# Patient Record
Sex: FEMALE | Race: WHITE | Employment: OTHER | URBAN - METROPOLITAN AREA
[De-identification: names, ages, dates, MRNs, and addresses within clinical notes are randomized per-mention and may not be internally consistent; named-entity substitution may affect disease eponyms.]

---

## 2017-02-07 ENCOUNTER — TELEPHONE (OUTPATIENT)
Dept: ENT CLINIC | Age: 82
End: 2017-02-07

## 2017-02-07 ENCOUNTER — OFFICE VISIT (OUTPATIENT)
Dept: ENT CLINIC | Age: 82
End: 2017-02-07

## 2017-02-07 VITALS
RESPIRATION RATE: 16 BRPM | HEIGHT: 66 IN | WEIGHT: 142 LBS | BODY MASS INDEX: 22.82 KG/M2 | SYSTOLIC BLOOD PRESSURE: 133 MMHG | DIASTOLIC BLOOD PRESSURE: 67 MMHG

## 2017-02-07 DIAGNOSIS — H61.21 IMPACTED CERUMEN OF RIGHT EAR: Primary | ICD-10-CM

## 2017-02-07 DIAGNOSIS — H90.5 HEARING DISORDER, SENSORINEURAL: ICD-10-CM

## 2017-02-07 DIAGNOSIS — H60.8X3 CHRONIC ECZEMATOUS OTITIS EXTERNA OF BOTH EARS: Primary | ICD-10-CM

## 2017-02-07 PROCEDURE — 1123F ACP DISCUSS/DSCN MKR DOCD: CPT | Performed by: OTOLARYNGOLOGY

## 2017-02-07 PROCEDURE — 99202 OFFICE O/P NEW SF 15 MIN: CPT | Performed by: OTOLARYNGOLOGY

## 2017-02-07 PROCEDURE — 4040F PNEUMOC VAC/ADMIN/RCVD: CPT | Performed by: OTOLARYNGOLOGY

## 2017-02-07 PROCEDURE — 1036F TOBACCO NON-USER: CPT | Performed by: OTOLARYNGOLOGY

## 2017-02-07 PROCEDURE — G8427 DOCREV CUR MEDS BY ELIG CLIN: HCPCS | Performed by: OTOLARYNGOLOGY

## 2017-02-07 PROCEDURE — 1090F PRES/ABSN URINE INCON ASSESS: CPT | Performed by: OTOLARYNGOLOGY

## 2017-02-07 PROCEDURE — G8420 CALC BMI NORM PARAMETERS: HCPCS | Performed by: OTOLARYNGOLOGY

## 2017-02-07 PROCEDURE — G8484 FLU IMMUNIZE NO ADMIN: HCPCS | Performed by: OTOLARYNGOLOGY

## 2017-02-07 RX ORDER — TRIAMCINOLONE ACETONIDE 1 MG/G
CREAM TOPICAL
Qty: 15 G | Refills: 1 | Status: ON HOLD | OUTPATIENT
Start: 2017-02-07 | End: 2020-11-24 | Stop reason: HOSPADM

## 2017-02-07 RX ORDER — DILTIAZEM HYDROCHLORIDE 240 MG/1
240 CAPSULE, COATED, EXTENDED RELEASE ORAL DAILY
Status: ON HOLD | COMMUNITY
End: 2020-10-27 | Stop reason: HOSPADM

## 2017-02-07 ASSESSMENT — ENCOUNTER SYMPTOMS
RESPIRATORY NEGATIVE: 1
EYES NEGATIVE: 1
ALLERGIC/IMMUNOLOGIC NEGATIVE: 1
SINUS PRESSURE: 0

## 2017-02-14 ENCOUNTER — TELEPHONE (OUTPATIENT)
Dept: ENT CLINIC | Age: 82
End: 2017-02-14

## 2017-02-20 ENCOUNTER — OFFICE VISIT (OUTPATIENT)
Dept: ORTHOPEDIC SURGERY | Age: 82
End: 2017-02-20

## 2017-02-20 VITALS
SYSTOLIC BLOOD PRESSURE: 133 MMHG | DIASTOLIC BLOOD PRESSURE: 67 MMHG | HEIGHT: 66 IN | WEIGHT: 142 LBS | BODY MASS INDEX: 22.82 KG/M2

## 2017-02-20 DIAGNOSIS — M89.8X6 PAIN OF LEFT TIBIA: Primary | ICD-10-CM

## 2017-02-20 PROCEDURE — 99203 OFFICE O/P NEW LOW 30 MIN: CPT | Performed by: ORTHOPAEDIC SURGERY

## 2017-02-20 PROCEDURE — 1036F TOBACCO NON-USER: CPT | Performed by: ORTHOPAEDIC SURGERY

## 2017-02-20 PROCEDURE — 1090F PRES/ABSN URINE INCON ASSESS: CPT | Performed by: ORTHOPAEDIC SURGERY

## 2017-02-20 PROCEDURE — 4040F PNEUMOC VAC/ADMIN/RCVD: CPT | Performed by: ORTHOPAEDIC SURGERY

## 2017-02-20 PROCEDURE — 73590 X-RAY EXAM OF LOWER LEG: CPT | Performed by: ORTHOPAEDIC SURGERY

## 2017-02-20 PROCEDURE — 1123F ACP DISCUSS/DSCN MKR DOCD: CPT | Performed by: ORTHOPAEDIC SURGERY

## 2017-02-20 PROCEDURE — G8484 FLU IMMUNIZE NO ADMIN: HCPCS | Performed by: ORTHOPAEDIC SURGERY

## 2017-02-20 PROCEDURE — G8427 DOCREV CUR MEDS BY ELIG CLIN: HCPCS | Performed by: ORTHOPAEDIC SURGERY

## 2017-02-20 PROCEDURE — G8420 CALC BMI NORM PARAMETERS: HCPCS | Performed by: ORTHOPAEDIC SURGERY

## 2017-02-20 RX ORDER — SULFAMETHOXAZOLE AND TRIMETHOPRIM 800; 160 MG/1; MG/1
1 TABLET ORAL 2 TIMES DAILY
Qty: 20 TABLET | Refills: 0 | Status: SHIPPED | OUTPATIENT
Start: 2017-02-20 | End: 2017-03-02

## 2017-03-20 ENCOUNTER — OFFICE VISIT (OUTPATIENT)
Dept: ORTHOPEDIC SURGERY | Age: 82
End: 2017-03-20

## 2017-03-20 VITALS — BODY MASS INDEX: 22.82 KG/M2 | HEIGHT: 66 IN | WEIGHT: 142 LBS

## 2017-03-20 DIAGNOSIS — M89.8X6 PAIN OF LEFT TIBIA: Primary | ICD-10-CM

## 2017-03-20 PROCEDURE — 1090F PRES/ABSN URINE INCON ASSESS: CPT | Performed by: ORTHOPAEDIC SURGERY

## 2017-03-20 PROCEDURE — G8427 DOCREV CUR MEDS BY ELIG CLIN: HCPCS | Performed by: ORTHOPAEDIC SURGERY

## 2017-03-20 PROCEDURE — G8484 FLU IMMUNIZE NO ADMIN: HCPCS | Performed by: ORTHOPAEDIC SURGERY

## 2017-03-20 PROCEDURE — G8420 CALC BMI NORM PARAMETERS: HCPCS | Performed by: ORTHOPAEDIC SURGERY

## 2017-03-20 PROCEDURE — 99213 OFFICE O/P EST LOW 20 MIN: CPT | Performed by: ORTHOPAEDIC SURGERY

## 2017-03-20 PROCEDURE — 4040F PNEUMOC VAC/ADMIN/RCVD: CPT | Performed by: ORTHOPAEDIC SURGERY

## 2017-03-20 PROCEDURE — MISC250 COMPRESSION STOCKING: Performed by: ORTHOPAEDIC SURGERY

## 2017-03-20 PROCEDURE — 1123F ACP DISCUSS/DSCN MKR DOCD: CPT | Performed by: ORTHOPAEDIC SURGERY

## 2017-03-20 PROCEDURE — 1036F TOBACCO NON-USER: CPT | Performed by: ORTHOPAEDIC SURGERY

## 2017-03-20 RX ORDER — HYDROCHLOROTHIAZIDE 25 MG/1
TABLET ORAL
COMMUNITY
Start: 2017-02-20 | End: 2020-10-16 | Stop reason: ALTCHOICE

## 2017-03-20 RX ORDER — SULFAMETHOXAZOLE AND TRIMETHOPRIM 800; 160 MG/1; MG/1
TABLET ORAL
COMMUNITY
Start: 2017-02-20 | End: 2018-08-27

## 2017-03-20 RX ORDER — OXYCODONE HYDROCHLORIDE AND ACETAMINOPHEN 5; 325 MG/1; MG/1
TABLET ORAL
COMMUNITY
Start: 2017-01-03 | End: 2020-10-16 | Stop reason: ALTCHOICE

## 2017-03-20 RX ORDER — LEVOTHYROXINE SODIUM 100 MCG
100 TABLET ORAL DAILY
Status: ON HOLD | COMMUNITY
Start: 2017-02-23 | End: 2020-10-27 | Stop reason: HOSPADM

## 2017-03-20 RX ORDER — ESCITALOPRAM OXALATE 10 MG/1
10 TABLET ORAL NIGHTLY
Status: ON HOLD | COMMUNITY
Start: 2017-01-03 | End: 2020-10-27 | Stop reason: HOSPADM

## 2017-03-20 RX ORDER — SULFAMETHOXAZOLE AND TRIMETHOPRIM 800; 160 MG/1; MG/1
TABLET ORAL
Qty: 14 TABLET | Refills: 0 | Status: SHIPPED | OUTPATIENT
Start: 2017-03-20 | End: 2018-08-27

## 2017-03-20 RX ORDER — PAREGORIC 2 MG/5ML
LIQUID ORAL
COMMUNITY
Start: 2017-01-03 | End: 2020-10-16 | Stop reason: ALTCHOICE

## 2017-12-01 ENCOUNTER — HOSPITAL ENCOUNTER (OUTPATIENT)
Dept: OTHER | Age: 82
Discharge: HOME | End: 2017-12-01
Attending: INTERNAL MEDICINE | Admitting: INTERNAL MEDICINE

## 2017-12-01 LAB
A/G RATIO: 1.2 (ref 1.1–2.2)
ALBUMIN SERPL-MCNC: 3.9 G/DL (ref 3.4–5)
ALP BLD-CCNC: 64 U/L (ref 40–129)
ALT SERPL-CCNC: 14 U/L (ref 10–40)
ANION GAP SERPL CALCULATED.3IONS-SCNC: 16 MMOL/L (ref 3–16)
AST SERPL-CCNC: 18 U/L (ref 15–37)
BACTERIA: ABNORMAL /HPF
BILIRUB SERPL-MCNC: 0.3 MG/DL (ref 0–1)
BILIRUBIN URINE: NEGATIVE
BLOOD, URINE: NEGATIVE
BUN BLDV-MCNC: 16 MG/DL (ref 7–20)
CALCIUM SERPL-MCNC: 9 MG/DL (ref 8.3–10.6)
CHLORIDE BLD-SCNC: 103 MMOL/L (ref 99–110)
CHOLESTEROL, TOTAL: 170 MG/DL (ref 0–199)
CLARITY: ABNORMAL
CO2: 25 MMOL/L (ref 21–32)
COLOR: YELLOW
CREAT SERPL-MCNC: 0.7 MG/DL (ref 0.6–1.2)
EPITHELIAL CELLS, UA: 7 /HPF (ref 0–5)
GFR AFRICAN AMERICAN: >60
GFR NON-AFRICAN AMERICAN: >60
GLOBULIN: 3.2 G/DL
GLUCOSE BLD-MCNC: 101 MG/DL (ref 70–99)
GLUCOSE URINE: NEGATIVE MG/DL
HCT VFR BLD CALC: 36.3 % (ref 36–48)
HDLC SERPL-MCNC: 69 MG/DL (ref 40–60)
HEMOGLOBIN: 11.4 G/DL (ref 12–16)
HYALINE CASTS: 6 /LPF (ref 0–8)
KETONES, URINE: NEGATIVE MG/DL
LDL CHOLESTEROL CALCULATED: 78 MG/DL
LEUKOCYTE ESTERASE, URINE: ABNORMAL
MCH RBC QN AUTO: 25.3 PG (ref 26–34)
MCHC RBC AUTO-ENTMCNC: 31.5 G/DL (ref 31–36)
MCV RBC AUTO: 80.3 FL (ref 80–100)
MICROSCOPIC EXAMINATION: YES
NITRITE, URINE: NEGATIVE
PDW BLD-RTO: 16.8 % (ref 12.4–15.4)
PH UA: 6
PLATELET # BLD: 218 K/UL (ref 135–450)
PMV BLD AUTO: 9.9 FL (ref 5–10.5)
POTASSIUM SERPL-SCNC: 5.1 MMOL/L (ref 3.5–5.1)
PROTEIN UA: NEGATIVE MG/DL
RBC # BLD: 4.52 M/UL (ref 4–5.2)
RBC UA: 3 /HPF (ref 0–4)
SODIUM BLD-SCNC: 144 MMOL/L (ref 136–145)
SPECIFIC GRAVITY UA: 1.02
TOTAL PROTEIN: 7.1 G/DL (ref 6.4–8.2)
TRIGL SERPL-MCNC: 115 MG/DL (ref 0–150)
TSH SERPL DL<=0.05 MIU/L-ACNC: 2.98 UIU/ML (ref 0.27–4.2)
URINE TYPE: ABNORMAL
UROBILINOGEN, URINE: 0.2 E.U./DL
VITAMIN B-12: 751 PG/ML (ref 211–911)
VLDLC SERPL CALC-MCNC: 23 MG/DL
WBC # BLD: 5.6 K/UL (ref 4–11)
WBC UA: 35 /HPF (ref 0–5)

## 2018-07-05 ENCOUNTER — PROCEDURE VISIT (OUTPATIENT)
Dept: NEUROLOGY | Age: 83
End: 2018-07-05

## 2018-07-05 DIAGNOSIS — G56.23 ULNAR NEUROPATHY OF BOTH UPPER EXTREMITIES: ICD-10-CM

## 2018-07-05 DIAGNOSIS — G56.03 BILATERAL CARPAL TUNNEL SYNDROME: Primary | ICD-10-CM

## 2018-07-05 PROCEDURE — 95911 NRV CNDJ TEST 9-10 STUDIES: CPT | Performed by: PSYCHIATRY & NEUROLOGY

## 2018-07-05 PROCEDURE — 95886 MUSC TEST DONE W/N TEST COMP: CPT | Performed by: PSYCHIATRY & NEUROLOGY

## 2018-08-27 ENCOUNTER — OFFICE VISIT (OUTPATIENT)
Dept: ENT CLINIC | Age: 83
End: 2018-08-27

## 2018-08-27 VITALS
SYSTOLIC BLOOD PRESSURE: 120 MMHG | WEIGHT: 143 LBS | DIASTOLIC BLOOD PRESSURE: 66 MMHG | HEART RATE: 57 BPM | OXYGEN SATURATION: 97 % | BODY MASS INDEX: 22.98 KG/M2 | HEIGHT: 66 IN | RESPIRATION RATE: 14 BRPM

## 2018-08-27 DIAGNOSIS — H61.21 IMPACTED CERUMEN OF RIGHT EAR: Primary | ICD-10-CM

## 2018-08-27 DIAGNOSIS — H90.3 SENSORINEURAL HEARING LOSS (SNHL) OF BOTH EARS: ICD-10-CM

## 2018-08-27 PROCEDURE — 69210 REMOVE IMPACTED EAR WAX UNI: CPT | Performed by: OTOLARYNGOLOGY

## 2018-08-27 PROCEDURE — G8420 CALC BMI NORM PARAMETERS: HCPCS | Performed by: OTOLARYNGOLOGY

## 2018-08-27 PROCEDURE — 1036F TOBACCO NON-USER: CPT | Performed by: OTOLARYNGOLOGY

## 2018-08-27 PROCEDURE — 1101F PT FALLS ASSESS-DOCD LE1/YR: CPT | Performed by: OTOLARYNGOLOGY

## 2018-08-27 PROCEDURE — 99212 OFFICE O/P EST SF 10 MIN: CPT | Performed by: OTOLARYNGOLOGY

## 2018-08-27 PROCEDURE — G8427 DOCREV CUR MEDS BY ELIG CLIN: HCPCS | Performed by: OTOLARYNGOLOGY

## 2018-08-27 PROCEDURE — 1123F ACP DISCUSS/DSCN MKR DOCD: CPT | Performed by: OTOLARYNGOLOGY

## 2018-08-27 PROCEDURE — 1090F PRES/ABSN URINE INCON ASSESS: CPT | Performed by: OTOLARYNGOLOGY

## 2018-08-27 PROCEDURE — 4040F PNEUMOC VAC/ADMIN/RCVD: CPT | Performed by: OTOLARYNGOLOGY

## 2018-12-10 ENCOUNTER — HOSPITAL ENCOUNTER (EMERGENCY)
Age: 83
Discharge: HOME | End: 2018-12-10
Payer: MEDICARE

## 2018-12-10 ENCOUNTER — APPOINTMENT (OUTPATIENT)
Dept: CT IMAGING | Age: 83
End: 2018-12-10
Payer: MEDICARE

## 2018-12-10 VITALS
SYSTOLIC BLOOD PRESSURE: 135 MMHG | WEIGHT: 145 LBS | BODY MASS INDEX: 23.3 KG/M2 | DIASTOLIC BLOOD PRESSURE: 60 MMHG | RESPIRATION RATE: 16 BRPM | HEIGHT: 66 IN | OXYGEN SATURATION: 97 % | HEART RATE: 52 BPM | TEMPERATURE: 96.9 F
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
## 2018-12-10 DIAGNOSIS — R04.0 EPISTAXIS: ICD-10-CM

## 2018-12-10 DIAGNOSIS — Z23 TETANUS TOXOID VACCINATION ADMINISTERED AT CURRENT VISIT: ICD-10-CM

## 2018-12-10 DIAGNOSIS — S01.81XA LACERATION OF FOREHEAD, INITIAL ENCOUNTER: ICD-10-CM

## 2018-12-10 PROCEDURE — 70486 CT MAXILLOFACIAL W/O DYE: CPT

## 2018-12-10 PROCEDURE — 70450 CT HEAD/BRAIN W/O DYE: CPT

## 2018-12-10 PROCEDURE — 4500000022 HC ED LEVEL 2 PROCEDURE

## 2018-12-10 PROCEDURE — 96372 THER/PROPH/DIAG INJ SC/IM: CPT

## 2018-12-10 PROCEDURE — 72125 CT NECK SPINE W/O DYE: CPT

## 2018-12-10 PROCEDURE — 99284 EMERGENCY DEPT VISIT MOD MDM: CPT

## 2018-12-10 PROCEDURE — 6360000002 HC RX W HCPCS: Performed by: NURSE PRACTITIONER

## 2018-12-10 PROCEDURE — 90715 TDAP VACCINE 7 YRS/> IM: CPT | Performed by: NURSE PRACTITIONER

## 2018-12-10 PROCEDURE — 90471 IMMUNIZATION ADMIN: CPT | Performed by: NURSE PRACTITIONER

## 2018-12-10 PROCEDURE — 6370000000 HC RX 637 (ALT 250 FOR IP): Performed by: NURSE PRACTITIONER

## 2018-12-10 RX ADMIN — TETANUS TOXOID, REDUCED DIPHTHERIA TOXOID AND ACELLULAR PERTUSSIS VACCINE, ADSORBED 0.5 ML: 5; 2.5; 8; 8; 2.5 SUSPENSION INTRAMUSCULAR at 10:42

## 2018-12-10 RX ADMIN — Medication: at 11:34

## 2018-12-10 ASSESSMENT — ENCOUNTER SYMPTOMS
RHINORRHEA: 0
CONSTIPATION: 0
ABDOMINAL PAIN: 0
NAUSEA: 0
VOMITING: 0
SHORTNESS OF BREATH: 0
BLOOD IN STOOL: 0
DIARRHEA: 0
SORE THROAT: 0

## 2018-12-10 ASSESSMENT — PAIN SCALES - GENERAL: PAINLEVEL_OUTOF10: 0

## 2018-12-10 NOTE — ED NOTES
Went in to d/c patient. instructions given. Pt verbalizes understanding. Pt remains A&O. Pt stood up and her nose started bleeding again. Fermín Gains NP notified. States to apply nasal clamp and provide pt with ice packs. Nasal clamp applied. Ice packs given. Pt food arrived that was ordered earlier. Pt at bedside eating meal per pt and family request to stay in room to complete meal. Pt d/c complete, pt just eating at bedside prior to leaving.       Leighton Thompson RN  12/10/18 2355

## 2018-12-10 NOTE — ED NOTES
Pt was at dentist office today about 0845 and states she tripped over her own feet and fell. Denies having symptoms prior to fall. Pt states she has no symptoms or pain. Pt has abrasions to face. Nose was bleeding but has subsided. A&O. Family present. Call light within reach. Bed in lowest position with appropriate side rails up.        James Velasquez RN  12/10/18 6438

## 2018-12-10 NOTE — ED PROVIDER NOTES
damage noted    Treatment:     Area cleansed with:  Hibiclens    Amount of cleaning:  Standard    Irrigation solution:  Sterile water  Skin repair:     Repair method:  Tissue adhesive  Approximation:     Approximation:  Close  Post-procedure details:     Dressing:  Open (no dressing)    Patient tolerance of procedure: Tolerated well, no immediate complications         The wound is cleansed, debrided of foreign material as much as possible, and dressed. The patient is alerted to watch for any signs of infection (redness, pus, pain, increased swelling or fever) and call if such occurs. Home wound care instructions are provided. Tetanus vaccination status reviewed: Td vaccination indicated and given today. CRITICAL CARE TIME     n/a    CONSULTS:  None      EMERGENCY DEPARTMENT COURSE and DIFFERENTIAL DIAGNOSIS/MDM:   Vitals:    Vitals:    12/10/18 0950 12/10/18 1036 12/10/18 1208   BP: 125/72 (!) 137/59 135/60   Pulse: 55 53 52   Resp: 16 16 16   Temp: 96.9 °F (36.1 °C)     TempSrc: Oral     SpO2: 98% 96% 97%   Weight: 145 lb (65.8 kg)     Height: 5' 5.5\" (1.664 m)         Jair Downs was given the following medications:  Medications   Tetanus-Diphth-Acell Pertussis (BOOSTRIX) injection 0.5 mL (0.5 mLs Intramuscular Given 12/10/18 1042)   topical skin adhesive stick ( Topical Given by Other 12/10/18 6107)       Jair Downs was evaluated in the emergency department with concern for Evaluation after fall. She is identified have lacerations to her forehead as well as a contusion to her nose. No evidence of septal hematoma. CT imaging of the head, facial bones, and neck are negative for acute abnormality. She is offered pain medication in the ER but declined this. Her laceration was repaired per the procedure note above. She did receive a tetanus booster in the ER. My suspicion is low for serious injury including fracture, dislocation, compartment syndrome, or intracranial hemorrhage.   The injury sounds consistent with mechanical fall. My suspicion is low for syncopy, arrhythmia, TIA, stroke, seizure, abuse, or PE. Eliza Murphy is stable in the ER and safe to follow as an outpatient. The patient is discharged on the following medications. They were counseled on how to take the newly prescribed medications:  New Prescriptions    No medications on file    . Instructed to follow-up with:  Frantz Bar MD  [place] [id number] [address]  [phone]    Schedule an appointment as soon as possible for a visit in 3 days  For a recheck    Return to the ER for new or worsening symptoms. This plan was discussed with the patient and all family available in the room at discharge who are all in agreement with the plan. I evaluated the patient. The physician was available for consultation as needed. The patient and / or the family were informed of the results of any tests, a time was given to answer questions, a plan was proposed and they agreed with plan. FINAL IMPRESSION      1. Fall, initial encounter    2. Laceration of forehead, initial encounter    3. Epistaxis    4.  Tetanus toxoid vaccination administered at current visit          DISPOSITION/PLAN   DISPOSITION Decision To Discharge 12/10/2018 12:16:56 PM        DISCONTINUED MEDICATIONS:  Discontinued Medications    No medications on file                (Please note that portions of this note were completed with a voice recognition program.  Efforts were made to edit the dictations but occasionally wordsare mis-transcribed.)    AME Altman CNP (electronically signed)        AME Altman CNP  12/10/18 3529

## 2019-03-13 ENCOUNTER — HOSPITAL ENCOUNTER (OUTPATIENT)
Age: 84
Discharge: HOME | End: 2019-03-13
Payer: MEDICARE

## 2019-03-13 LAB
A/G RATIO: 1.2 (ref 1.1–2.2)
ALBUMIN SERPL-MCNC: 4.3 G/DL (ref 3.4–5)
ALP BLD-CCNC: 80 U/L (ref 40–129)
ALT SERPL-CCNC: 29 U/L (ref 10–40)
ANION GAP SERPL CALCULATED.3IONS-SCNC: 13 MMOL/L (ref 3–16)
AST SERPL-CCNC: 48 U/L (ref 15–37)
BACTERIA: ABNORMAL /HPF
BILIRUB SERPL-MCNC: 0.4 MG/DL (ref 0–1)
BILIRUBIN URINE: NEGATIVE
BLOOD, URINE: NEGATIVE
BUN BLDV-MCNC: 22 MG/DL (ref 7–20)
CALCIUM SERPL-MCNC: 9.8 MG/DL (ref 8.3–10.6)
CHLORIDE BLD-SCNC: 102 MMOL/L (ref 99–110)
CHOLESTEROL, TOTAL: 182 MG/DL (ref 0–199)
CLARITY: ABNORMAL
CO2: 26 MMOL/L (ref 21–32)
COLOR: YELLOW
CREAT SERPL-MCNC: 0.8 MG/DL (ref 0.6–1.2)
EPITHELIAL CELLS, UA: 1 /HPF (ref 0–5)
FERRITIN: 18 NG/ML (ref 15–150)
GFR AFRICAN AMERICAN: >60
GFR NON-AFRICAN AMERICAN: >60
GLOBULIN: 3.6 G/DL
GLUCOSE BLD-MCNC: 91 MG/DL (ref 70–99)
GLUCOSE URINE: NEGATIVE MG/DL
HDLC SERPL-MCNC: 67 MG/DL (ref 40–60)
HYALINE CASTS: 1 /LPF (ref 0–8)
IRON SATURATION: 10 % (ref 15–50)
IRON: 53 UG/DL (ref 37–145)
KETONES, URINE: NEGATIVE MG/DL
LDL CHOLESTEROL CALCULATED: 89 MG/DL
LEUKOCYTE ESTERASE, URINE: ABNORMAL
MICROSCOPIC EXAMINATION: YES
NITRITE, URINE: NEGATIVE
PH UA: 6.5 (ref 5–8)
POTASSIUM SERPL-SCNC: 5.4 MMOL/L (ref 3.5–5.1)
PROTEIN UA: NEGATIVE MG/DL
RBC UA: 2 /HPF (ref 0–4)
REASON FOR REJECTION: NORMAL
REJECTED TEST: NORMAL
SODIUM BLD-SCNC: 141 MMOL/L (ref 136–145)
SPECIFIC GRAVITY UA: 1.01 (ref 1–1.03)
T3 FREE: 2.5 PG/ML (ref 2.3–4.2)
T4 FREE: 1.3 NG/DL (ref 0.9–1.8)
TOTAL IRON BINDING CAPACITY: 522 UG/DL (ref 260–445)
TOTAL PROTEIN: 7.9 G/DL (ref 6.4–8.2)
TRIGL SERPL-MCNC: 128 MG/DL (ref 0–150)
TSH SERPL DL<=0.05 MIU/L-ACNC: 3.14 UIU/ML (ref 0.27–4.2)
URINE TYPE: ABNORMAL
UROBILINOGEN, URINE: 0.2 E.U./DL
VITAMIN B-12: 1302 PG/ML (ref 211–911)
VITAMIN D 25-HYDROXY: 65.8 NG/ML
VLDLC SERPL CALC-MCNC: 26 MG/DL
WBC UA: 54 /HPF (ref 0–5)

## 2019-03-13 PROCEDURE — 87077 CULTURE AEROBIC IDENTIFY: CPT

## 2019-03-13 PROCEDURE — 36415 COLL VENOUS BLD VENIPUNCTURE: CPT

## 2019-03-13 PROCEDURE — 83550 IRON BINDING TEST: CPT

## 2019-03-13 PROCEDURE — 84482 T3 REVERSE: CPT

## 2019-03-13 PROCEDURE — 80061 LIPID PANEL: CPT

## 2019-03-13 PROCEDURE — 81001 URINALYSIS AUTO W/SCOPE: CPT

## 2019-03-13 PROCEDURE — 84443 ASSAY THYROID STIM HORMONE: CPT

## 2019-03-13 PROCEDURE — 82607 VITAMIN B-12: CPT

## 2019-03-13 PROCEDURE — 84481 FREE ASSAY (FT-3): CPT

## 2019-03-13 PROCEDURE — 87086 URINE CULTURE/COLONY COUNT: CPT

## 2019-03-13 PROCEDURE — 82306 VITAMIN D 25 HYDROXY: CPT

## 2019-03-13 PROCEDURE — 84439 ASSAY OF FREE THYROXINE: CPT

## 2019-03-13 PROCEDURE — 87186 SC STD MICRODIL/AGAR DIL: CPT

## 2019-03-13 PROCEDURE — 83540 ASSAY OF IRON: CPT

## 2019-03-13 PROCEDURE — 82728 ASSAY OF FERRITIN: CPT

## 2019-03-13 PROCEDURE — 80053 COMPREHEN METABOLIC PANEL: CPT

## 2019-03-15 LAB
ORGANISM: ABNORMAL
URINE CULTURE, ROUTINE: ABNORMAL
URINE CULTURE, ROUTINE: ABNORMAL

## 2019-03-17 LAB — T3 REVERSE: 28.7 NG/DL (ref 9–27)

## 2019-06-08 ENCOUNTER — HOSPITAL ENCOUNTER (OUTPATIENT)
Age: 84
Discharge: HOME | DRG: 436 | End: 2019-06-08
Payer: MEDICARE

## 2019-06-08 LAB
ALBUMIN SERPL-MCNC: 3.7 G/DL (ref 3.4–5)
ALP BLD-CCNC: 224 U/L (ref 40–129)
ALT SERPL-CCNC: 92 U/L (ref 10–40)
ANION GAP SERPL CALCULATED.3IONS-SCNC: 11 MMOL/L (ref 3–16)
AST SERPL-CCNC: 75 U/L (ref 15–37)
BASOPHILS ABSOLUTE: 0.1 K/UL (ref 0–0.2)
BASOPHILS RELATIVE PERCENT: 1.3 %
BILIRUB SERPL-MCNC: 11.5 MG/DL (ref 0–1)
BILIRUBIN DIRECT: 8.8 MG/DL (ref 0–0.3)
BILIRUBIN URINE: ABNORMAL
BILIRUBIN, INDIRECT: 2.7 MG/DL (ref 0–1)
BLOOD, URINE: NEGATIVE
BUN BLDV-MCNC: 13 MG/DL (ref 7–20)
CALCIUM SERPL-MCNC: 9.1 MG/DL (ref 8.3–10.6)
CHLORIDE BLD-SCNC: 104 MMOL/L (ref 99–110)
CLARITY: ABNORMAL
CO2: 26 MMOL/L (ref 21–32)
COLOR: ABNORMAL
CREAT SERPL-MCNC: <0.5 MG/DL (ref 0.6–1.2)
EOSINOPHILS ABSOLUTE: 0.1 K/UL (ref 0–0.6)
EOSINOPHILS RELATIVE PERCENT: 3.4 %
EPITHELIAL CELLS, UA: 6 /HPF (ref 0–5)
GFR AFRICAN AMERICAN: >60
GFR NON-AFRICAN AMERICAN: >60
GLUCOSE BLD-MCNC: 96 MG/DL (ref 70–99)
GLUCOSE URINE: NEGATIVE MG/DL
HCT VFR BLD CALC: 42.1 % (ref 36–48)
HEMOGLOBIN: 14.1 G/DL (ref 12–16)
HYALINE CASTS: 5 /LPF (ref 0–8)
KETONES, URINE: NEGATIVE MG/DL
LEUKOCYTE ESTERASE, URINE: NEGATIVE
LYMPHOCYTES ABSOLUTE: 0.8 K/UL (ref 1–5.1)
LYMPHOCYTES RELATIVE PERCENT: 18.5 %
MCH RBC QN AUTO: 29.9 PG (ref 26–34)
MCHC RBC AUTO-ENTMCNC: 33.4 G/DL (ref 31–36)
MCV RBC AUTO: 89.3 FL (ref 80–100)
MICROSCOPIC EXAMINATION: YES
MONOCYTES ABSOLUTE: 0.5 K/UL (ref 0–1.3)
MONOCYTES RELATIVE PERCENT: 11.9 %
NEUTROPHILS ABSOLUTE: 2.9 K/UL (ref 1.7–7.7)
NEUTROPHILS RELATIVE PERCENT: 64.9 %
NITRITE, URINE: NEGATIVE
PDW BLD-RTO: 21.3 % (ref 12.4–15.4)
PH UA: 6 (ref 5–8)
PHOSPHORUS: 2.8 MG/DL (ref 2.5–4.9)
PLATELET # BLD: 179 K/UL (ref 135–450)
PMV BLD AUTO: 10.9 FL (ref 5–10.5)
POTASSIUM SERPL-SCNC: 3.9 MMOL/L (ref 3.5–5.1)
PROTEIN UA: NEGATIVE MG/DL
RBC # BLD: 4.71 M/UL (ref 4–5.2)
RBC UA: 5 /HPF (ref 0–4)
SEDIMENTATION RATE, ERYTHROCYTE: 35 MM/HR (ref 0–30)
SODIUM BLD-SCNC: 141 MMOL/L (ref 136–145)
SPECIFIC GRAVITY UA: 1.01 (ref 1–1.03)
TOTAL PROTEIN: 7 G/DL (ref 6.4–8.2)
URINE TYPE: ABNORMAL
UROBILINOGEN, URINE: 0.2 E.U./DL
WBC # BLD: 4.4 K/UL (ref 4–11)
WBC UA: 0 /HPF (ref 0–5)

## 2019-06-08 PROCEDURE — 36415 COLL VENOUS BLD VENIPUNCTURE: CPT

## 2019-06-08 PROCEDURE — 83540 ASSAY OF IRON: CPT

## 2019-06-08 PROCEDURE — 83550 IRON BINDING TEST: CPT

## 2019-06-08 PROCEDURE — 86709 HEPATITIS A IGM ANTIBODY: CPT

## 2019-06-08 PROCEDURE — 85025 COMPLETE CBC W/AUTO DIFF WBC: CPT

## 2019-06-08 PROCEDURE — 82105 ALPHA-FETOPROTEIN SERUM: CPT

## 2019-06-08 PROCEDURE — 86708 HEPATITIS A ANTIBODY: CPT

## 2019-06-08 PROCEDURE — 82728 ASSAY OF FERRITIN: CPT

## 2019-06-08 PROCEDURE — 84100 ASSAY OF PHOSPHORUS: CPT

## 2019-06-08 PROCEDURE — 80048 BASIC METABOLIC PNL TOTAL CA: CPT

## 2019-06-08 PROCEDURE — 82378 CARCINOEMBRYONIC ANTIGEN: CPT

## 2019-06-08 PROCEDURE — 86301 IMMUNOASSAY TUMOR CA 19-9: CPT

## 2019-06-08 PROCEDURE — 85652 RBC SED RATE AUTOMATED: CPT

## 2019-06-08 PROCEDURE — 87086 URINE CULTURE/COLONY COUNT: CPT

## 2019-06-08 PROCEDURE — 80076 HEPATIC FUNCTION PANEL: CPT

## 2019-06-08 PROCEDURE — 81001 URINALYSIS AUTO W/SCOPE: CPT

## 2019-06-09 ENCOUNTER — APPOINTMENT (OUTPATIENT)
Dept: CT IMAGING | Age: 84
DRG: 436 | End: 2019-06-09
Payer: MEDICARE

## 2019-06-09 ENCOUNTER — APPOINTMENT (OUTPATIENT)
Dept: ULTRASOUND IMAGING | Age: 84
DRG: 436 | End: 2019-06-09
Payer: MEDICARE

## 2019-06-09 ENCOUNTER — HOSPITAL ENCOUNTER (INPATIENT)
Age: 84
LOS: 5 days | Discharge: HOME | DRG: 436 | End: 2019-06-14
Attending: EMERGENCY MEDICINE | Admitting: FAMILY MEDICINE
Payer: MEDICARE

## 2019-06-09 DIAGNOSIS — R74.01 TRANSAMINITIS: ICD-10-CM

## 2019-06-09 DIAGNOSIS — R17 ELEVATED BILIRUBIN: ICD-10-CM

## 2019-06-09 DIAGNOSIS — K72.00 ACUTE LIVER FAILURE WITHOUT HEPATIC COMA: Primary | ICD-10-CM

## 2019-06-09 DIAGNOSIS — R17 JAUNDICE, NON-NEONATAL: ICD-10-CM

## 2019-06-09 PROBLEM — K75.9 HEPATITIS: Status: ACTIVE | Noted: 2019-06-09

## 2019-06-09 LAB
A/G RATIO: 1.3 (ref 1.1–2.2)
ALBUMIN SERPL-MCNC: 3.6 G/DL (ref 3.4–5)
ALP BLD-CCNC: 216 U/L (ref 40–129)
ALT SERPL-CCNC: 82 U/L (ref 10–40)
ANION GAP SERPL CALCULATED.3IONS-SCNC: 11 MMOL/L (ref 3–16)
APTT: 33.9 SEC (ref 26–36)
AST SERPL-CCNC: 71 U/L (ref 15–37)
BASOPHILS ABSOLUTE: 0.1 K/UL (ref 0–0.2)
BASOPHILS RELATIVE PERCENT: 1.6 %
BILIRUB SERPL-MCNC: 11.6 MG/DL (ref 0–1)
BILIRUB SERPL-MCNC: 11.8 MG/DL (ref 0–1)
BILIRUBIN DIRECT: 8.7 MG/DL (ref 0–0.3)
BILIRUBIN, INDIRECT: 3.1 MG/DL (ref 0–1)
BUN BLDV-MCNC: 16 MG/DL (ref 7–20)
CALCIUM SERPL-MCNC: 8.8 MG/DL (ref 8.3–10.6)
CEA: 3.9 NG/ML (ref 0–5)
CHLORIDE BLD-SCNC: 105 MMOL/L (ref 99–110)
CO2: 24 MMOL/L (ref 21–32)
CREAT SERPL-MCNC: <0.5 MG/DL (ref 0.6–1.2)
EOSINOPHILS ABSOLUTE: 0.1 K/UL (ref 0–0.6)
EOSINOPHILS RELATIVE PERCENT: 1.6 %
FERRITIN: 80.7 NG/ML (ref 15–150)
GFR AFRICAN AMERICAN: >60
GFR NON-AFRICAN AMERICAN: >60
GLOBULIN: 2.8 G/DL
GLUCOSE BLD-MCNC: 104 MG/DL (ref 70–99)
HAV IGM SER IA-ACNC: NORMAL
HCT VFR BLD CALC: 44.1 % (ref 36–48)
HEMOGLOBIN: 14.8 G/DL (ref 12–16)
INR BLD: 1.08 (ref 0.86–1.14)
IRON SATURATION: 35 % (ref 15–50)
IRON: 121 UG/DL (ref 37–145)
LIPASE: 26 U/L (ref 13–60)
LYMPHOCYTES ABSOLUTE: 0.7 K/UL (ref 1–5.1)
LYMPHOCYTES RELATIVE PERCENT: 15.6 %
MCH RBC QN AUTO: 29.9 PG (ref 26–34)
MCHC RBC AUTO-ENTMCNC: 33.5 G/DL (ref 31–36)
MCV RBC AUTO: 89.2 FL (ref 80–100)
MONOCYTES ABSOLUTE: 0.5 K/UL (ref 0–1.3)
MONOCYTES RELATIVE PERCENT: 12.1 %
NEUTROPHILS ABSOLUTE: 3 K/UL (ref 1.7–7.7)
NEUTROPHILS RELATIVE PERCENT: 69.1 %
PDW BLD-RTO: 21.4 % (ref 12.4–15.4)
PLATELET # BLD: 180 K/UL (ref 135–450)
PMV BLD AUTO: 10.6 FL (ref 5–10.5)
POTASSIUM REFLEX MAGNESIUM: 3.7 MMOL/L (ref 3.5–5.1)
PROTHROMBIN TIME: 12.3 SEC (ref 9.8–13)
RBC # BLD: 4.94 M/UL (ref 4–5.2)
SODIUM BLD-SCNC: 140 MMOL/L (ref 136–145)
TOTAL IRON BINDING CAPACITY: 350 UG/DL (ref 260–445)
TOTAL PROTEIN: 6.4 G/DL (ref 6.4–8.2)
WBC # BLD: 4.3 K/UL (ref 4–11)

## 2019-06-09 PROCEDURE — 6370000000 HC RX 637 (ALT 250 FOR IP): Performed by: EMERGENCY MEDICINE

## 2019-06-09 PROCEDURE — 6360000004 HC RX CONTRAST MEDICATION: Performed by: EMERGENCY MEDICINE

## 2019-06-09 PROCEDURE — 85610 PROTHROMBIN TIME: CPT

## 2019-06-09 PROCEDURE — 6370000000 HC RX 637 (ALT 250 FOR IP): Performed by: FAMILY MEDICINE

## 2019-06-09 PROCEDURE — 93005 ELECTROCARDIOGRAM TRACING: CPT | Performed by: EMERGENCY MEDICINE

## 2019-06-09 PROCEDURE — 80053 COMPREHEN METABOLIC PANEL: CPT

## 2019-06-09 PROCEDURE — 85025 COMPLETE CBC W/AUTO DIFF WBC: CPT

## 2019-06-09 PROCEDURE — 74177 CT ABD & PELVIS W/CONTRAST: CPT

## 2019-06-09 PROCEDURE — 85730 THROMBOPLASTIN TIME PARTIAL: CPT

## 2019-06-09 PROCEDURE — 94760 N-INVAS EAR/PLS OXIMETRY 1: CPT

## 2019-06-09 PROCEDURE — 83690 ASSAY OF LIPASE: CPT

## 2019-06-09 PROCEDURE — 76705 ECHO EXAM OF ABDOMEN: CPT

## 2019-06-09 PROCEDURE — 1200000000 HC SEMI PRIVATE

## 2019-06-09 PROCEDURE — 80074 ACUTE HEPATITIS PANEL: CPT

## 2019-06-09 PROCEDURE — 2580000003 HC RX 258: Performed by: FAMILY MEDICINE

## 2019-06-09 PROCEDURE — 99285 EMERGENCY DEPT VISIT HI MDM: CPT

## 2019-06-09 PROCEDURE — 82248 BILIRUBIN DIRECT: CPT

## 2019-06-09 RX ORDER — TRAMADOL HYDROCHLORIDE 50 MG/1
50 TABLET ORAL EVERY 4 HOURS PRN
Status: DISCONTINUED | OUTPATIENT
Start: 2019-06-09 | End: 2019-06-14 | Stop reason: HOSPADM

## 2019-06-09 RX ORDER — ONDANSETRON 2 MG/ML
4 INJECTION INTRAMUSCULAR; INTRAVENOUS EVERY 6 HOURS PRN
Status: DISCONTINUED | OUTPATIENT
Start: 2019-06-09 | End: 2019-06-14 | Stop reason: HOSPADM

## 2019-06-09 RX ORDER — SODIUM CHLORIDE 0.9 % (FLUSH) 0.9 %
10 SYRINGE (ML) INJECTION EVERY 12 HOURS SCHEDULED
Status: DISCONTINUED | OUTPATIENT
Start: 2019-06-09 | End: 2019-06-14 | Stop reason: HOSPADM

## 2019-06-09 RX ORDER — DILTIAZEM HYDROCHLORIDE 120 MG/1
240 CAPSULE, COATED, EXTENDED RELEASE ORAL ONCE
Status: COMPLETED | OUTPATIENT
Start: 2019-06-09 | End: 2019-06-09

## 2019-06-09 RX ORDER — METOPROLOL SUCCINATE 50 MG/1
25 TABLET, EXTENDED RELEASE ORAL DAILY
Status: DISCONTINUED | OUTPATIENT
Start: 2019-06-10 | End: 2019-06-14 | Stop reason: HOSPADM

## 2019-06-09 RX ORDER — SODIUM CHLORIDE 9 MG/ML
INJECTION, SOLUTION INTRAVENOUS CONTINUOUS
Status: DISCONTINUED | OUTPATIENT
Start: 2019-06-09 | End: 2019-06-13

## 2019-06-09 RX ORDER — ESCITALOPRAM OXALATE 10 MG/1
10 TABLET ORAL NIGHTLY
Status: DISCONTINUED | OUTPATIENT
Start: 2019-06-09 | End: 2019-06-14 | Stop reason: HOSPADM

## 2019-06-09 RX ORDER — SODIUM CHLORIDE 0.9 % (FLUSH) 0.9 %
10 SYRINGE (ML) INJECTION PRN
Status: DISCONTINUED | OUTPATIENT
Start: 2019-06-09 | End: 2019-06-14 | Stop reason: HOSPADM

## 2019-06-09 RX ORDER — DILTIAZEM HYDROCHLORIDE 240 MG/1
240 CAPSULE, COATED, EXTENDED RELEASE ORAL DAILY
Status: DISCONTINUED | OUTPATIENT
Start: 2019-06-10 | End: 2019-06-14 | Stop reason: HOSPADM

## 2019-06-09 RX ORDER — ACETAMINOPHEN 80 MG
TABLET,CHEWABLE ORAL
Status: DISPENSED
Start: 2019-06-09 | End: 2019-06-10

## 2019-06-09 RX ORDER — LANOLIN ALCOHOL/MO/W.PET/CERES
1000 CREAM (GRAM) TOPICAL DAILY
COMMUNITY
End: 2020-10-16 | Stop reason: ALTCHOICE

## 2019-06-09 RX ORDER — MORPHINE SULFATE 2 MG/ML
2 INJECTION, SOLUTION INTRAMUSCULAR; INTRAVENOUS EVERY 4 HOURS PRN
Status: DISCONTINUED | OUTPATIENT
Start: 2019-06-09 | End: 2019-06-14 | Stop reason: HOSPADM

## 2019-06-09 RX ORDER — LEVOTHYROXINE SODIUM 0.1 MG/1
100 TABLET ORAL DAILY
Status: DISCONTINUED | OUTPATIENT
Start: 2019-06-10 | End: 2019-06-14 | Stop reason: HOSPADM

## 2019-06-09 RX ORDER — ACETAMINOPHEN 325 MG/1
650 TABLET ORAL EVERY 4 HOURS PRN
Status: DISCONTINUED | OUTPATIENT
Start: 2019-06-09 | End: 2019-06-14 | Stop reason: HOSPADM

## 2019-06-09 RX ADMIN — IOPAMIDOL 75 ML: 755 INJECTION, SOLUTION INTRAVENOUS at 19:08

## 2019-06-09 RX ADMIN — SODIUM CHLORIDE: 9 INJECTION, SOLUTION INTRAVENOUS at 22:55

## 2019-06-09 RX ADMIN — DILTIAZEM HYDROCHLORIDE 240 MG: 120 CAPSULE, COATED, EXTENDED RELEASE ORAL at 19:59

## 2019-06-09 RX ADMIN — METOPROLOL TARTRATE 12.5 MG: 25 TABLET ORAL at 19:59

## 2019-06-09 RX ADMIN — Medication 10 ML: at 22:55

## 2019-06-09 RX ADMIN — ACETAMINOPHEN 650 MG: 325 TABLET, FILM COATED ORAL at 23:56

## 2019-06-09 ASSESSMENT — PAIN DESCRIPTION - LOCATION: LOCATION: HEAD

## 2019-06-09 ASSESSMENT — PAIN SCALES - GENERAL
PAINLEVEL_OUTOF10: 2
PAINLEVEL_OUTOF10: 0

## 2019-06-09 ASSESSMENT — PAIN DESCRIPTION - DESCRIPTORS: DESCRIPTORS: HEADACHE

## 2019-06-09 ASSESSMENT — PAIN DESCRIPTION - PAIN TYPE: TYPE: CHRONIC PAIN

## 2019-06-09 NOTE — ED NOTES
Dr. Steffanie Hubbard mother-in-law, sent in for blood work. Report of jaundice x 2 days. + liver enzymes, + bilirubin in urine. CT for possible obstruction?      Erika Jarrett RN  06/09/19 5369

## 2019-06-09 NOTE — ED PROVIDER NOTES
Emergency Department Encounter  Location: [address] [facility] Kaylynn Chen    Patient: Srini Batista  MRN: [id number]  : 1928  Date of evaluation: 2019  ED Provider: Everet Frankel, MD    7:00 PM  Srini Batista was checked out to me by Dr. Tete Hewitt. Please see his/her initial documentation for details of the patient's initial ED presentation, physical exam and completed studies. In brief, Srini Batista is a 80 y.o. female that presented to the emergency department With jaundice and elevated LFTs. She was sent by her doctor. Jaundice was first noted by family and primary care physician. On my exam, the patient is nontoxic and in no acute distress. However, she has severe jaundice and scleral icterus. Abdomen is soft and nondistended. He is nontender. She is not in respiratory distress. The patient is currently awaiting completion of her imaging before admission.     I have reviewed and interpreted all of the currently available lab results and diagnostics from this visit:  Results for orders placed or performed during the hospital encounter of 19   CBC Auto Differential   Result Value Ref Range    WBC 4.3 4.0 - 11.0 K/uL    RBC 4.94 4.00 - 5.20 M/uL    Hemoglobin 14.8 12.0 - 16.0 g/dL    Hematocrit 44.1 36.0 - 48.0 %    MCV 89.2 80.0 - 100.0 fL    MCH 29.9 26.0 - 34.0 pg    MCHC 33.5 31.0 - 36.0 g/dL    RDW 21.4 (H) 12.4 - 15.4 %    Platelets 518 068 - 582 K/uL    MPV 10.6 (H) 5.0 - 10.5 fL    Neutrophils % 69.1 %    Lymphocytes % 15.6 %    Monocytes % 12.1 %    Eosinophils % 1.6 %    Basophils % 1.6 %    Neutrophils # 3.0 1.7 - 7.7 K/uL    Lymphocytes # 0.7 (L) 1.0 - 5.1 K/uL    Monocytes # 0.5 0.0 - 1.3 K/uL    Eosinophils # 0.1 0.0 - 0.6 K/uL    Basophils # 0.1 0.0 - 0.2 K/uL   Comprehensive Metabolic Panel w/ Reflex to MG   Result Value Ref Range    Sodium 140 136 - 145 mmol/L    Potassium reflex Magnesium 3.7 3.5 - 5.1 mmol/L    Chloride 105 99 - 110 mmol/L

## 2019-06-09 NOTE — ED NOTES
Peripheral IV established, #20 right forearm. Blood specimens collected and sent to lab for processing. Patient tolerated procedure well.       Redmond Essex, RN  06/09/19 8962
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
## 2019-06-09 NOTE — ED NOTES
Pt to ultrasound at present. Will give evening meds that pt requests upon return to room.      Reema Manzo, FRANCOISE  06/09/19 5965

## 2019-06-10 ENCOUNTER — APPOINTMENT (OUTPATIENT)
Dept: MRI IMAGING | Age: 84
DRG: 436 | End: 2019-06-10
Payer: MEDICARE

## 2019-06-10 PROBLEM — K83.1 OBSTRUCTIVE JAUNDICE: Status: ACTIVE | Noted: 2019-06-10

## 2019-06-10 LAB
EKG ATRIAL RATE: 277 BPM
EKG DIAGNOSIS: NORMAL
EKG Q-T INTERVAL: 384 MS
EKG QRS DURATION: 80 MS
EKG QTC CALCULATION (BAZETT): 490 MS
EKG R AXIS: 24 DEGREES
EKG T AXIS: -5 DEGREES
EKG VENTRICULAR RATE: 98 BPM
HAV AB SERPL IA-ACNC: POSITIVE
HAV IGM SER IA-ACNC: NORMAL
HEPATITIS B CORE IGM ANTIBODY: NORMAL
HEPATITIS B SURFACE ANTIGEN INTERPRETATION: NORMAL
HEPATITIS C ANTIBODY INTERPRETATION: NORMAL
URINE CULTURE, ROUTINE: NORMAL

## 2019-06-10 PROCEDURE — 6360000002 HC RX W HCPCS: Performed by: INTERNAL MEDICINE

## 2019-06-10 PROCEDURE — 74181 MRI ABDOMEN W/O CONTRAST: CPT

## 2019-06-10 PROCEDURE — 6370000000 HC RX 637 (ALT 250 FOR IP): Performed by: FAMILY MEDICINE

## 2019-06-10 PROCEDURE — 99222 1ST HOSP IP/OBS MODERATE 55: CPT | Performed by: SURGERY

## 2019-06-10 PROCEDURE — 2580000003 HC RX 258: Performed by: FAMILY MEDICINE

## 2019-06-10 PROCEDURE — 1200000000 HC SEMI PRIVATE

## 2019-06-10 PROCEDURE — 93010 ELECTROCARDIOGRAM REPORT: CPT | Performed by: INTERNAL MEDICINE

## 2019-06-10 RX ORDER — KETOROLAC TROMETHAMINE 15 MG/ML
15 INJECTION, SOLUTION INTRAMUSCULAR; INTRAVENOUS
Status: COMPLETED | OUTPATIENT
Start: 2019-06-10 | End: 2019-06-10

## 2019-06-10 RX ORDER — HYDRALAZINE HYDROCHLORIDE 20 MG/ML
5 INJECTION INTRAMUSCULAR; INTRAVENOUS EVERY 6 HOURS PRN
Status: DISCONTINUED | OUTPATIENT
Start: 2019-06-10 | End: 2019-06-14 | Stop reason: HOSPADM

## 2019-06-10 RX ORDER — LORAZEPAM 2 MG/ML
0.25 INJECTION INTRAMUSCULAR
Status: COMPLETED | OUTPATIENT
Start: 2019-06-10 | End: 2019-06-10

## 2019-06-10 RX ADMIN — KETOROLAC TROMETHAMINE 15 MG: 15 INJECTION, SOLUTION INTRAMUSCULAR; INTRAVENOUS at 12:42

## 2019-06-10 RX ADMIN — Medication 10 ML: at 21:21

## 2019-06-10 RX ADMIN — HYDRALAZINE HYDROCHLORIDE 5 MG: 20 INJECTION INTRAMUSCULAR; INTRAVENOUS at 13:17

## 2019-06-10 RX ADMIN — LORAZEPAM 0.25 MG: 2 INJECTION INTRAMUSCULAR; INTRAVENOUS at 18:48

## 2019-06-10 RX ADMIN — SODIUM CHLORIDE: 9 INJECTION, SOLUTION INTRAVENOUS at 12:42

## 2019-06-10 RX ADMIN — ESCITALOPRAM OXALATE 10 MG: 10 TABLET ORAL at 21:21

## 2019-06-10 ASSESSMENT — PAIN DESCRIPTION - DESCRIPTORS
DESCRIPTORS: HEADACHE
DESCRIPTORS: HEADACHE

## 2019-06-10 ASSESSMENT — PAIN DESCRIPTION - PAIN TYPE: TYPE: ACUTE PAIN

## 2019-06-10 ASSESSMENT — PAIN SCALES - GENERAL
PAINLEVEL_OUTOF10: 0
PAINLEVEL_OUTOF10: 0
PAINLEVEL_OUTOF10: 5
PAINLEVEL_OUTOF10: 0

## 2019-06-10 NOTE — H&P
HOSPITALISTS HISTORY AND PHYSICAL    06/09    Patient Information:  Charley Mart is a 80 y.o. female [id number]  PCP:  Meera Rodriguez MD (Tel: [phone] )    Chief complaint:    Chief Complaint   Patient presents with    Abnormal Lab     pt had blood work done yesterday, patient was told her liver enzymes were elevated, sent to ED for a cat scan and evaluation. History of Present Illness:  Maikel Mohamud is a 80 y.o. female . Was sent to the ER by PCP d/t elevated liver enzymes. She is c/o jaundice and fatigue worst for couple of days. + wteight loss. She denies abdominal pain, nausea, vomiting, diarrhea, constipation   AST , aLT and bili are elevated   Total bili is 11.6 direct bili 8.7 . CT abdomen showed intra and extra hepatic biliary dilation and distal CBD narrowing, concerning for obstruction . [facility] bladder appears distended no sonogram signs of cholecystitis. REVIEW OF SYSTEMS:   Constitutional: Negative for fever,chills or night sweats  ENT: Negative for rhinorrhea, epistaxis, hoarseness, sore throat. Respiratory: Negative for shortness of breath,wheezing  Cardiovascular: Negative for chest pain, palpitations   Gastrointestinal: Negative for nausea, vomiting, diarrhea  Genitourinary: Negative for polyuria, dysuria   Hematologic/Lymphatic: Negative for bleeding tendency, easy bruising  Musculoskeletal: Negative for myalgias and arthralgias  Neurologic: Negative for confusion,dysarthria. Skin: Negative for itching,rash  Psychiatric: Negative for depression,anxiety, agitation. Endocrine: Negative for polydipsia,polyuria,heat /cold intolerance. Past Medical History:   has a past medical history of Arthritis, Hypertension, Rectocele, and SVT (supraventricular tachycardia) ([facility] [address] Past Surgical History:   has a past surgical history that includes Lumbar disc surgery (2008);  Hysterectomy; fracture surgery (4-4-2010); and Rectocele repair (4/29/13). Medications:  No current facility-administered medications on file prior to encounter. Current Outpatient Medications on File Prior to Encounter   Medication Sig Dispense Refill    vitamin B-12 (CYANOCOBALAMIN) 1000 MCG tablet Take 1,000 mcg by mouth daily      Vitamin D-Vitamin K (VITAMIN K2-VITAMIN D3)  MCG-UNIT CAPS Take 1 tablet by mouth daily      CHELATED MAGNESIUM PO Take 2 tablets by mouth daily      escitalopram (LEXAPRO) 10 MG tablet       SYNTHROID 50 MCG tablet Take 100 mcg by mouth Daily       diltiazem (CARTIA XT) 240 MG extended release capsule Take 240 mg by mouth daily      omega-3 acid ethyl esters (LOVAZA) 1 G capsule Take 2 g by mouth daily.  meloxicam (MOBIC) 15 MG tablet Take 15 mg by mouth daily.  estrogens, conjugated, (PREMARIN) 0.625 MG tablet Take 0.625 mg by mouth daily.  metoprolol (TOPROL-XL) 25 MG XL tablet Take 25 mg by mouth daily       Coenzyme Q10 (CO Q-10) 300 MG CAPS Take 1 tablet by mouth Daily.  Glucosamine-Chondroit-Vit C-Mn (GLUCOSAMINE 1500 COMPLEX PO) Take 1,500 mg by mouth 3 times daily.  NONFORMULARY 2 capsules daily at 1800 cellgevity 2 caps daily, vision 2 tabs daily, move free triple strength 2 daily,bowel support formula 1 daily, omega support 2 daily. Foundation vitamin formula 1 daily.  hydrochlorothiazide (HYDRODIURIL) 25 MG tablet       oxyCODONE-acetaminophen (PERCOCET) 5-325 MG per tablet .  paregoric 2 MG/5ML solution Prn for diarrhea      triamcinolone (KENALOG) 0.1 % cream Apply topically both ear canals with q tip every night.  15 g 1     Current Facility-Administered Medications   Medication Dose Route Frequency Provider Last Rate Last Dose    pill splitter             diltiazem (CARDIZEM CD) extended release capsule 240 mg  240 mg Oral Daily Dolores Denton MD        escitalopram (LEXAPRO) tablet 10 mg  10 mg Oral Nightly MD Javid Villatoro metoprolol succinate (TOPROL XL) extended release tablet 25 mg  25 mg Oral Daily Mariana Paredes MD        levothyroxine (SYNTHROID) tablet 100 mcg  100 mcg Oral Daily Mariana Paredes MD        morphine (PF) injection 2 mg  2 mg Intravenous Q4H PRN Mariana Paredes MD        traMADol Giacomo Scot) tablet 50 mg  50 mg Oral Q4H PRN Mariana Paredes MD        sodium chloride flush 0.9 % injection 10 mL  10 mL Intravenous 2 times per day Mariana Paredes MD   10 mL at 06/09/19 2255    sodium chloride flush 0.9 % injection 10 mL  10 mL Intravenous PRN Mariana Paredes MD        magnesium hydroxide (MILK OF MAGNESIA) 400 MG/5ML suspension 30 mL  30 mL Oral Daily PRN Mariana Paredes MD        ondansetron [facility]) injection 4 mg  4 mg Intravenous Q6H PRN Mariana Paredes MD        enoxaparin (LOVENOX) injection 40 mg  40 mg Subcutaneous Daily Mariana Paredes MD        acetaminophen (TYLENOL) tablet 650 mg  650 mg Oral Q4H PRN Mariana Paredes MD   650 mg at 06/09/19 2356    0.9 % sodium chloride infusion   Intravenous Continuous Dolores Denton  mL/hr at 06/09/19 2255      pneumococcal 13-valent conjugate (PREVNAR) injection 0.5 mL  0.5 mL Intramuscular Prior to discharge Mariana Paredes MD           Allergies: Allergies   Allergen Reactions    Sulfa Antibiotics Nausea And Vomiting        Social History:   reports that she has never smoked. She has never used smokeless tobacco. She reports that she does not drink alcohol or use drugs. Family History:  family history is not on file. , not pertinent    Physical Exam:  BP (!) 164/78   Pulse 90   Temp 97.8 °F (36.6 °C) (Oral)   Resp 16   Ht 5' 5.5\" (1.664 m)   Wt 145 lb (65.8 kg)   SpO2 99%   BMI 23.76 kg/m²     General appearance:  Appears comfortable. Eyes: Sclera clear, pupils equal  ENT: Moist mucus membranes, no thrush. Trachea midline. Cardiovascular: Regular rhythm, normal S1, S2. No murmur, gallop, rub.  No edema in lower extremities  Respiratory: Clear to auscultation bilaterally, no wheeze, good inspiratory effort  Gastrointestinal: Abdomen soft, non-tender, not distended, normal bowel sounds  Musculoskeletal: No cyanosis in digits, neck supple  Neurology: Cranial nerves grossly intact. Alert and oriented in time, place and person. No speech or motor deficits  Psychiatry: Appropriate affect. Not agitated  Skin: +jaundice    Labs:  CBC:   Lab Results   Component Value Date    WBC 4.3 06/09/2019    RBC 4.94 06/09/2019    HGB 14.8 06/09/2019    HCT 44.1 06/09/2019    MCV 89.2 06/09/2019    MCH 29.9 06/09/2019    MCHC 33.5 06/09/2019    RDW 21.4 06/09/2019     06/09/2019    MPV 10.6 06/09/2019     BMP:    Lab Results   Component Value Date     06/09/2019    K 3.7 06/09/2019     06/09/2019    CO2 24 06/09/2019    BUN 16 06/09/2019    CREATININE <0.5 06/09/2019    CALCIUM 8.8 06/09/2019    GFRAA >60 06/09/2019    GFRAA >60 04/30/2013    LABGLOM >60 06/09/2019    GLUCOSE 104 06/09/2019       Chest Xray:   EKG:        Problem List  Active Problems:    Hepatitis    Obstructive jaundice  Resolved Problems:    * No resolved hospital problems. *        Assessment/Plan:     80 y.o. female . Was sent to the ER by PCP d/t elevated liver enzymes. She is c/o jaundice and fatigue worst for couple of days. + wteight loss. She denies abdominal pain, nausea, vomiting, diarrhea, constipation   AST , aLT and bili are elevated   Total bili is 11.6 direct bili 8.7 . CT abdomen showed intra and extra hepatic biliary dilation and distal CBD narrowing, concerning for obstruction . [facility] bladder appears distended no sonogram signs of cholecystitis. -  MRCP ordered  - surgery and GI consult in place  Clear liquid diet  NPO after midnight    Admit as inpatient. I anticipate hospitalization spanning more than two midnights for investigation and treatment of the above medically necessary diagnoses.       Joe Escobedo MD    06/09

## 2019-06-10 NOTE — PROGRESS NOTES
The care plan and education has been reviewed and mutually agreed upon with the patient. Assessment complete and documented. A/O, VSS. No complaints of pain. Patient aware of NPO status, and MRCP scheduled for today. Up in chair, alarm activated. Needs met. Call light in reach, will monitor.
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
## 2019-06-10 NOTE — CARE COORDINATION
Discharge Planning Assessment     discharge planner met with patient/(and family member) to discuss reason for admission, current living situation, and potential needs at the time of discharge    Demographics/Insurance verified Yes/No  Yes    Current type of dwelling: House    Patient from ECF/ confirmed with: No    Living arrangements: Lives alone. Has good family support    Level of function/Support:  Independent prior to admission including driving    PCP: danielle Turner    Last Visit to PCP: Last week    DME: walker (seated)    Active with any community resources/agencies/skilled home care: No    Medication compliance issues: No    Financial issues that could impact healthcare: No    Transportation at the time of discharge: Family can transport    Tentative discharge plan: Anticipate discharge to home with no follow-up care needs. Patient very independent. Does not want any help. Patient will continue to be followed for support and discharge planning.      Electronically signed by ISRRAEL Arias on 6/10/2019 at 11:37 AM

## 2019-06-10 NOTE — PROGRESS NOTES
Admission assessment completed. Medications given per MAR. Patient up to bathroom with minimal assistance. Denies other needs. The care plan and education has been reviewed and mutually agreed upon with the patient.

## 2019-06-10 NOTE — ED NOTES
Report reviewed by and given to [facility] RN. Pt to be placed on portable telemetry and transferred to room 480.      Cheyenne Bernal RN  06/09/19 0926

## 2019-06-11 ENCOUNTER — ANESTHESIA EVENT (OUTPATIENT)
Dept: ENDOSCOPY | Age: 84
DRG: 436 | End: 2019-06-11
Payer: MEDICARE

## 2019-06-11 PROCEDURE — 99232 SBSQ HOSP IP/OBS MODERATE 35: CPT | Performed by: SURGERY

## 2019-06-11 PROCEDURE — 6370000000 HC RX 637 (ALT 250 FOR IP): Performed by: FAMILY MEDICINE

## 2019-06-11 PROCEDURE — 2580000003 HC RX 258: Performed by: FAMILY MEDICINE

## 2019-06-11 PROCEDURE — 6360000002 HC RX W HCPCS: Performed by: INTERNAL MEDICINE

## 2019-06-11 PROCEDURE — 1200000000 HC SEMI PRIVATE

## 2019-06-11 PROCEDURE — 6360000002 HC RX W HCPCS: Performed by: FAMILY MEDICINE

## 2019-06-11 PROCEDURE — 94760 N-INVAS EAR/PLS OXIMETRY 1: CPT

## 2019-06-11 RX ADMIN — SODIUM CHLORIDE: 9 INJECTION, SOLUTION INTRAVENOUS at 13:21

## 2019-06-11 RX ADMIN — ACETAMINOPHEN 650 MG: 325 TABLET, FILM COATED ORAL at 23:52

## 2019-06-11 RX ADMIN — SODIUM CHLORIDE: 9 INJECTION, SOLUTION INTRAVENOUS at 00:54

## 2019-06-11 RX ADMIN — HYDRALAZINE HYDROCHLORIDE 5 MG: 20 INJECTION INTRAMUSCULAR; INTRAVENOUS at 05:33

## 2019-06-11 RX ADMIN — ENOXAPARIN SODIUM 40 MG: 40 INJECTION SUBCUTANEOUS at 09:13

## 2019-06-11 RX ADMIN — METOPROLOL SUCCINATE 25 MG: 50 TABLET, FILM COATED, EXTENDED RELEASE ORAL at 09:13

## 2019-06-11 RX ADMIN — DILTIAZEM HYDROCHLORIDE 240 MG: 240 CAPSULE, COATED, EXTENDED RELEASE ORAL at 09:13

## 2019-06-11 RX ADMIN — ESCITALOPRAM OXALATE 10 MG: 10 TABLET ORAL at 21:49

## 2019-06-11 ASSESSMENT — PAIN SCALES - GENERAL
PAINLEVEL_OUTOF10: 0
PAINLEVEL_OUTOF10: 1
PAINLEVEL_OUTOF10: 0

## 2019-06-11 ASSESSMENT — PAIN DESCRIPTION - DESCRIPTORS: DESCRIPTORS: HEADACHE

## 2019-06-11 ASSESSMENT — PAIN DESCRIPTION - LOCATION: LOCATION: HEAD

## 2019-06-11 ASSESSMENT — PAIN DESCRIPTION - PAIN TYPE: TYPE: CHRONIC PAIN

## 2019-06-11 NOTE — PLAN OF CARE
Problem: Falls - Risk of:  Goal: Will remain free from falls  Description  Will remain free from falls  6/11/2019 1916 by Faviola Call RN  Outcome: Ongoing  6/11/2019 1017 by Francesco Galindo RN  Outcome: Ongoing  Goal: Absence of physical injury  Description  Absence of physical injury  6/11/2019 1916 by Faviola Call RN  Outcome: Ongoing  6/11/2019 1017 by Francesco Galindo RN  Outcome: Ongoing     Problem: Cardiac:  Goal: Ability to maintain vital signs within normal range will improve  Description  Ability to maintain vital signs within normal range will improve  6/11/2019 1916 by Faviola Call RN  Outcome: Ongoing  6/11/2019 1017 by Francesco Galindo RN  Outcome: Ongoing
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
## 2019-06-11 NOTE — PROGRESS NOTES
[facility]  Gastroenterology Progress Note    Annalisa Deng is a 80 y.o. female patient. Active Problems:    Hepatitis    Obstructive jaundice  Resolved Problems:    * No resolved hospital problems. *      SUBJECTIVE:  No complaints. Eating breakfast.     ROS:  No fever, chills  No chest pain, palpitations  No SOB, cough  Gastrointestinal ROS: no abdominal pain, N/V    Physical    VITALS:  BP (!) 145/83   Pulse 102   Temp 98.4 °F (36.9 °C) (Oral)   Resp 16   Ht 5' 5.5\" (1.664 m)   Wt 145 lb (65.8 kg)   SpO2 94%   BMI 23.76 kg/m²   TEMPERATURE:  Current - Temp: 98.4 °F (36.9 °C); Max - Temp  Av °F (36.7 °C)  Min: 97.7 °F (36.5 °C)  Max: 98.4 °F (36.9 °C)    NAD  Regular rate   Lungs CTA Bilaterally  Abdomen soft, ND, NT,  Bowel sounds normal.    Data    Data Review:    Recent Labs     19  1440 19  1726   WBC 4.4 4.3   HGB 14.1 14.8   HCT 42.1 44.1   MCV 89.3 89.2    180     Recent Labs     19  1440 19  1726    140   K 3.9 3.7    105   CO2 26 24   PHOS 2.8  --    BUN 13 16   CREATININE <0.5* <0.5*     Recent Labs     19  1440 19  1725 19  1726   AST 75*  --  71*   ALT 92*  --  82*   BILIDIR 8.8* 8.7*  --    BILITOT 11.5* 11.8* 11.6*   ALKPHOS 224*  --  216*     Recent Labs     19  1726   LIPASE 26.0     Recent Labs     19  1726   PROTIME 12.3   INR 1.08     No results for input(s): PTT in the last 72 hours. 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  Suggest ERCP for further  evaluation. ASSESSMENT :    Obstructive jaundice - Bili 11.6. CT with intra and extrahepatic biliary dilation with abrupt narrowing of distal CBD. There are gallstones on imaging. No abdominal pain, fevers or leukocytosis. No cholangitis.  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        PLAN :  - general diet today  - NPO after midnight  - plan EUS tomorrow for visualization of ampulla, biopsies and staging    Discussed with Dr. Emery Singh, [address]    I have personally performed a face to face diagnostic evaluation on this patient. I have interviewed and examined the patient and I agree with the findings and recommended plan of care. In summary, my findings and plan are the following: Discussed findings to date with pt and her family.  Will plan on an EUS in the AM. Pt remains asymptomatic       Stone Torres MD  [address] [address]

## 2019-06-11 NOTE — PROGRESS NOTES
Shift assessment complete, see doc flow sheet. VSS. Assisted pt to restroom and up to chair with no complications, pt tolerated well. Spoke with Chucky Velarde with GI who stated pt may go ahead and eat today and will tentatively plan for an ERCP vs. EUS tomorrow. Pt and family updated on POC. AM medications given as ordered, see MAR. Pt denies any further needs at this time, will continue to monitor. Chair alarm on, call light within reach.

## 2019-06-11 NOTE — PROGRESS NOTES
Shift assessment completed. Medications given per MAR. Patient denies pain. Discussed with patient about NPO after midnight for possible ERCP. The care plan and education has been reviewed and mutually agreed upon with the patient.

## 2019-06-11 NOTE — PROGRESS NOTES
[facility]    6/11/2019 12:08 PM        Name: Maikel Mohamud . Admitted: 6/9/2019  Primary Care Provider: Meera Rodriguez MD (Tel: [phone])      Subjective:  . Seen in the early afternoon with son at bedside  Pt sitting up in the chair. She denies any pain. Ate 75% of a large breakfast tray      Will have EUS tomorrow    Reviewed interval ancillary notes    Current Medications    hydrALAZINE (APRESOLINE) injection 5 mg Q6H PRN   diltiazem (CARDIZEM CD) extended release capsule 240 mg Daily   escitalopram (LEXAPRO) tablet 10 mg Nightly   metoprolol succinate (TOPROL XL) extended release tablet 25 mg Daily   levothyroxine (SYNTHROID) tablet 100 mcg Daily   morphine (PF) injection 2 mg Q4H PRN   traMADol (ULTRAM) tablet 50 mg Q4H PRN   sodium chloride flush 0.9 % injection 10 mL 2 times per day   sodium chloride flush 0.9 % injection 10 mL PRN   magnesium hydroxide (MILK OF MAGNESIA) 400 MG/5ML suspension 30 mL Daily PRN   ondansetron (ZOFRAN) injection 4 mg Q6H PRN   enoxaparin (LOVENOX) injection 40 mg Daily   acetaminophen (TYLENOL) tablet 650 mg Q4H PRN   0.9 % sodium chloride infusion Continuous   pneumococcal 13-valent conjugate (PREVNAR) injection 0.5 mL Prior to discharge       Objective:  BP (!) 145/83   Pulse 102   Temp 98.4 °F (36.9 °C) (Oral)   Resp 16   Ht 5' 5.5\" (1.664 m)   Wt 145 lb (65.8 kg)   SpO2 94%   BMI 23.76 kg/m²     Intake/Output Summary (Last 24 hours) at 6/11/2019 1208  Last data filed at 6/11/2019 1126  Gross per 24 hour   Intake 992 ml   Output 1400 ml   Net -408 ml    Wt Readings from Last 3 Encounters:   06/09/19 145 lb (65.8 kg)   12/10/18 145 lb (65.8 kg)   08/27/18 143 lb (64.9 kg)       General appearance:  Appears comfortable while up in the chair. Looks younger than stated age. Jaundice noted   Eyes: Sclera clear.  Pupils equal.  ENT: Moist oral mucosa. Trachea midline, no adenopathy. Cardiovascular: Regular rhythm, normal S1, S2. No murmur. No edema in lower extremities  Respiratory: Not using accessory muscles. Good inspiratory effort. Clear to auscultation bilaterally, no wheeze or crackles. GI: Abdomen soft, no tenderness, not distended, normal bowel sounds, no guarding   Musculoskeletal: No cyanosis in digits, neck supple  Neurology: CN 2-12 grossly intact. No speech or motor deficits  Psych: Normal affect. 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  Suggest ERCP for further  evaluation. Problem List  Active Problems:    Hepatitis    Obstructive jaundice  Resolved Problems:    * No resolved hospital problems. *       Assessment & Plan:   1. Painless jaundice: CT with biliary dilation with abrupt narrowing of distal CBD and gallstones on imaging. MRCP with ampullary mass noted. Plan for EUS in the am and possible follow up ERCP. Appreciate input from surgery and GI  2. HTN:  bp adequate on home therapy of BB and CCB  3. Hypothyroidism:  TSH was in range in March 2019.  Continue home therapy       Diet: Diet NPO, After Midnight  DIET GENERAL;  Code:Full Code  DVT PPX      Ratna Crocker, APRN - CNP   6/11/2019 12:08 PM

## 2019-06-11 NOTE — PROGRESS NOTES
MD   diltiazem (CARTIA XT) 240 MG extended release capsule Take 240 mg by mouth daily   Yes Historical Provider, MD   omega-3 acid ethyl esters (LOVAZA) 1 G capsule Take 2 g by mouth daily. Yes Historical Provider, MD   meloxicam (MOBIC) 15 MG tablet Take 15 mg by mouth daily. Yes Historical Provider, MD   estrogens, conjugated, (PREMARIN) 0.625 MG tablet Take 0.625 mg by mouth daily. Yes Historical Provider, MD   metoprolol (TOPROL-XL) 25 MG XL tablet Take 25 mg by mouth daily    Yes Historical Provider, MD   Coenzyme Q10 (CO Q-10) 300 MG CAPS Take 1 tablet by mouth Daily. Yes Historical Provider, MD   Glucosamine-Chondroit-Vit C-Mn (GLUCOSAMINE 1500 COMPLEX PO) Take 1,500 mg by mouth 3 times daily. Yes Historical Provider, MD   NONFORMULARY 2 capsules daily at 1800 cellgevity 2 caps daily, vision 2 tabs daily, move free triple strength 2 daily,bowel support formula 1 daily, omega support 2 daily. Foundation vitamin formula 1 daily. Yes Historical Provider, MD   hydrochlorothiazide (HYDRODIURIL) 25 MG tablet  2/20/17   Historical Provider, MD   oxyCODONE-acetaminophen (PERCOCET) 5-325 MG per tablet . 1/3/17   Historical Provider, MD   paregoric 2 MG/5ML solution Prn for diarrhea 1/3/17   Historical Provider, MD   triamcinolone (KENALOG) 0.1 % cream Apply topically both ear canals with q tip every night. 2/7/17   Nilay Rollins MD        Allergies:  Sulfa antibiotics    Social History:    reports that she has never smoked. She has never used smokeless tobacco. She reports that she does not drink alcohol or use drugs. Family History:    History reviewed. No pertinent family history.     REVIEW OF SYSTEMS:  CONSTITUTIONAL:  negative  HEENT:  negative  RESPIRATORY:  negative  CARDIOVASCULAR:  negative  GASTROINTESTINAL:  negative except for jaundice  GENITOURINARY:  negative  HEMATOLOGIC/LYMPHATIC:  negative  NEUROLOGICAL:  negative  SKIN: negative      OBJECTIVE:  VITALS:  BP (!) 145/83   Pulse 102 Temp 98.4 °F (36.9 °C) (Oral)   Resp 16   Ht 5' 5.5\" (1.664 m)   Wt 145 lb (65.8 kg)   SpO2 94%   BMI 23.76 kg/m²     INTAKE/OUTPUT:    I/O last 3 completed shifts: In: 992 [I.V.:992]  Out: 800 [Urine:800]  I/O this shift:  In: -   Out: 400 [Urine:400]    CONSTITUTIONAL:  awake and alert  LUNGS:  clear to auscultation  HEART: RRR  ABDOMEN:  normal bowel sounds, soft, non-distended, non-tender         Data:  CBC:   Recent Labs     06/08/19  1440 06/09/19  1726   WBC 4.4 4.3   HGB 14.1 14.8   HCT 42.1 44.1    180     BMP:    Recent Labs     06/08/19  1440 06/09/19  1726    140   K 3.9 3.7    105   CO2 26 24   BUN 13 16   CREATININE <0.5* <0.5*   GLUCOSE 96 104*     Hepatic:   Recent Labs     06/08/19  1440 06/09/19  1725 06/09/19  1726   AST 75*  --  71*   ALT 92*  --  82*   BILITOT 11.5* 11.8* 11.6*   ALKPHOS 224*  --  216*     Mag:    No results for input(s): MG in the last 72 hours. Phos:     Recent Labs     06/08/19  1440   PHOS 2.8      INR:   Recent Labs     06/09/19  1726   INR 1.08       Radiology Review:   MRI ABDOMEN WO CONTRAST MRCP [[id number]] Collected: 06/10/19 2105   Updated: 06/10/19 2119    Narrative:     EXAMINATION:  MRCP 6/10/2019 7:39 pm    TECHNIQUE:  After initial T2 axial and coronal images, thick slab, thin slab and 3D  coronal MRCP sequences were obtained without the administration of  intravenous contrast.  MIP images are provided for review. COMPARISON:  Ultrasound and CT on 06/09/2019    HISTORY:  Abnormal LFTs.  Biliary ductal dilatation. FINDINGS:  Gallbladder: Gallbladder is distended and contains mild sludge.  No  significant gallbladder wall thickening or pericholecystic fluid. Bile Ducts: Moderate intrahepatic and marked extrahepatic biliary ductal  dilatation with the common bile duct measuring 2 cm in caliber.  There is  abrupt termination of the distal common bile duct with a subtle ampullary  mass measuring 1.4 x 1.1 cm.     Pancreatic Duct:

## 2019-06-12 ENCOUNTER — ANESTHESIA (OUTPATIENT)
Dept: ENDOSCOPY | Age: 84
DRG: 436 | End: 2019-06-12
Payer: MEDICARE

## 2019-06-12 VITALS — SYSTOLIC BLOOD PRESSURE: 108 MMHG | DIASTOLIC BLOOD PRESSURE: 59 MMHG | OXYGEN SATURATION: 96 %

## 2019-06-12 LAB
AFP: 0.8 UG/L
ALBUMIN SERPL-MCNC: 3.1 G/DL (ref 3.4–5)
ALP BLD-CCNC: 190 U/L (ref 40–129)
ALT SERPL-CCNC: 77 U/L (ref 10–40)
AST SERPL-CCNC: 58 U/L (ref 15–37)
BASOPHILS ABSOLUTE: 0.1 K/UL (ref 0–0.2)
BASOPHILS RELATIVE PERCENT: 1.7 %
BILIRUB SERPL-MCNC: 11.2 MG/DL (ref 0–1)
BILIRUBIN DIRECT: 8.5 MG/DL (ref 0–0.3)
BILIRUBIN, INDIRECT: 2.7 MG/DL (ref 0–1)
CA 19-9: 63 U/ML (ref 0–35)
EOSINOPHILS ABSOLUTE: 0.1 K/UL (ref 0–0.6)
EOSINOPHILS RELATIVE PERCENT: 3.2 %
HCT VFR BLD CALC: 38.7 % (ref 36–48)
HEMOGLOBIN: 13 G/DL (ref 12–16)
LYMPHOCYTES ABSOLUTE: 0.8 K/UL (ref 1–5.1)
LYMPHOCYTES RELATIVE PERCENT: 21.3 %
MCH RBC QN AUTO: 30.1 PG (ref 26–34)
MCHC RBC AUTO-ENTMCNC: 33.6 G/DL (ref 31–36)
MCV RBC AUTO: 89.6 FL (ref 80–100)
MONOCYTES ABSOLUTE: 0.4 K/UL (ref 0–1.3)
MONOCYTES RELATIVE PERCENT: 12.1 %
NEUTROPHILS ABSOLUTE: 2.3 K/UL (ref 1.7–7.7)
NEUTROPHILS RELATIVE PERCENT: 61.7 %
PDW BLD-RTO: 21.5 % (ref 12.4–15.4)
PLATELET # BLD: 158 K/UL (ref 135–450)
PMV BLD AUTO: 11.2 FL (ref 5–10.5)
RBC # BLD: 4.32 M/UL (ref 4–5.2)
TOTAL PROTEIN: 5.5 G/DL (ref 6.4–8.2)
WBC # BLD: 3.7 K/UL (ref 4–11)

## 2019-06-12 PROCEDURE — 6370000000 HC RX 637 (ALT 250 FOR IP): Performed by: FAMILY MEDICINE

## 2019-06-12 PROCEDURE — 80076 HEPATIC FUNCTION PANEL: CPT

## 2019-06-12 PROCEDURE — 6360000002 HC RX W HCPCS: Performed by: NURSE ANESTHETIST, CERTIFIED REGISTERED

## 2019-06-12 PROCEDURE — 3700000000 HC ANESTHESIA ATTENDED CARE: Performed by: INTERNAL MEDICINE

## 2019-06-12 PROCEDURE — 36415 COLL VENOUS BLD VENIPUNCTURE: CPT

## 2019-06-12 PROCEDURE — 7100000001 HC PACU RECOVERY - ADDTL 15 MIN: Performed by: INTERNAL MEDICINE

## 2019-06-12 PROCEDURE — 2580000003 HC RX 258: Performed by: FAMILY MEDICINE

## 2019-06-12 PROCEDURE — 2709999900 HC NON-CHARGEABLE SUPPLY: Performed by: INTERNAL MEDICINE

## 2019-06-12 PROCEDURE — 3609012400 HC EGD TRANSORAL BIOPSY SINGLE/MULTIPLE: Performed by: INTERNAL MEDICINE

## 2019-06-12 PROCEDURE — 85025 COMPLETE CBC W/AUTO DIFF WBC: CPT

## 2019-06-12 PROCEDURE — 88173 CYTOPATH EVAL FNA REPORT: CPT

## 2019-06-12 PROCEDURE — 7100000000 HC PACU RECOVERY - FIRST 15 MIN: Performed by: INTERNAL MEDICINE

## 2019-06-12 PROCEDURE — 6370000000 HC RX 637 (ALT 250 FOR IP): Performed by: INTERNAL MEDICINE

## 2019-06-12 PROCEDURE — 2580000003 HC RX 258: Performed by: NURSE ANESTHETIST, CERTIFIED REGISTERED

## 2019-06-12 PROCEDURE — 2580000003 HC RX 258: Performed by: ANESTHESIOLOGY

## 2019-06-12 PROCEDURE — 88172 CYTP DX EVAL FNA 1ST EA SITE: CPT

## 2019-06-12 PROCEDURE — 3609020800 HC EGD W/EUS FNA: Performed by: INTERNAL MEDICINE

## 2019-06-12 PROCEDURE — 3700000001 HC ADD 15 MINUTES (ANESTHESIA): Performed by: INTERNAL MEDICINE

## 2019-06-12 PROCEDURE — 1200000000 HC SEMI PRIVATE

## 2019-06-12 PROCEDURE — 88305 TISSUE EXAM BY PATHOLOGIST: CPT

## 2019-06-12 PROCEDURE — 88177 CYTP FNA EVAL EA ADDL: CPT

## 2019-06-12 PROCEDURE — 0DB68ZX EXCISION OF STOMACH, VIA NATURAL OR ARTIFICIAL OPENING ENDOSCOPIC, DIAGNOSTIC: ICD-10-PCS | Performed by: INTERNAL MEDICINE

## 2019-06-12 PROCEDURE — 2500000003 HC RX 250 WO HCPCS: Performed by: NURSE ANESTHETIST, CERTIFIED REGISTERED

## 2019-06-12 RX ORDER — SODIUM CHLORIDE 9 MG/ML
INJECTION, SOLUTION INTRAVENOUS CONTINUOUS
Status: DISCONTINUED | OUTPATIENT
Start: 2019-06-12 | End: 2019-06-14 | Stop reason: HOSPADM

## 2019-06-12 RX ORDER — PANTOPRAZOLE SODIUM 40 MG/1
40 TABLET, DELAYED RELEASE ORAL
Status: DISCONTINUED | OUTPATIENT
Start: 2019-06-12 | End: 2019-06-14 | Stop reason: HOSPADM

## 2019-06-12 RX ORDER — PROPOFOL 10 MG/ML
INJECTION, EMULSION INTRAVENOUS PRN
Status: DISCONTINUED | OUTPATIENT
Start: 2019-06-12 | End: 2019-06-12 | Stop reason: SDUPTHER

## 2019-06-12 RX ORDER — LIDOCAINE HYDROCHLORIDE 20 MG/ML
INJECTION, SOLUTION EPIDURAL; INFILTRATION; INTRACAUDAL; PERINEURAL PRN
Status: DISCONTINUED | OUTPATIENT
Start: 2019-06-12 | End: 2019-06-12 | Stop reason: SDUPTHER

## 2019-06-12 RX ORDER — SODIUM CHLORIDE 9 MG/ML
INJECTION, SOLUTION INTRAVENOUS CONTINUOUS PRN
Status: DISCONTINUED | OUTPATIENT
Start: 2019-06-12 | End: 2019-06-12 | Stop reason: SDUPTHER

## 2019-06-12 RX ADMIN — SODIUM CHLORIDE: 9 INJECTION, SOLUTION INTRAVENOUS at 03:28

## 2019-06-12 RX ADMIN — PROPOFOL 420 MG: 10 INJECTION, EMULSION INTRAVENOUS at 11:32

## 2019-06-12 RX ADMIN — PHENYLEPHRINE HYDROCHLORIDE 100 MCG: 10 INJECTION INTRAVENOUS at 12:16

## 2019-06-12 RX ADMIN — LIDOCAINE HYDROCHLORIDE 25 MG: 20 INJECTION, SOLUTION EPIDURAL; INFILTRATION; INTRACAUDAL; PERINEURAL at 11:26

## 2019-06-12 RX ADMIN — METOPROLOL SUCCINATE 25 MG: 50 TABLET, FILM COATED, EXTENDED RELEASE ORAL at 09:42

## 2019-06-12 RX ADMIN — DILTIAZEM HYDROCHLORIDE 240 MG: 240 CAPSULE, COATED, EXTENDED RELEASE ORAL at 09:42

## 2019-06-12 RX ADMIN — SODIUM CHLORIDE: 9 INJECTION, SOLUTION INTRAVENOUS at 11:26

## 2019-06-12 RX ADMIN — ESCITALOPRAM OXALATE 10 MG: 10 TABLET ORAL at 20:35

## 2019-06-12 RX ADMIN — PANTOPRAZOLE SODIUM 40 MG: 40 TABLET, DELAYED RELEASE ORAL at 17:28

## 2019-06-12 RX ADMIN — SODIUM CHLORIDE: 9 INJECTION, SOLUTION INTRAVENOUS at 11:08

## 2019-06-12 ASSESSMENT — PAIN SCALES - GENERAL
PAINLEVEL_OUTOF10: 0

## 2019-06-12 ASSESSMENT — PAIN - FUNCTIONAL ASSESSMENT: PAIN_FUNCTIONAL_ASSESSMENT: 0-10

## 2019-06-12 NOTE — PROGRESS NOTES
Pt returned to room from endo, VSS, respirations easy and unlabored on room air. Pt denies any pain at this time. Provided with a vibha to drink and assisted pt to order lunch. Fall precautions in place, call light within reach. Will continue to monitor.

## 2019-06-12 NOTE — PROCEDURES
[facility]  ENDOSCOPIC ULTRASOUND (EUS) REPORT    Patient:  Alfredo Munoz    Referring Physician:  Milli Felton     Indication:  Abdominal mass     Medications:  MAC       Pre-Anesthesia Assessment:  I have reviewed and am in agreement with patient history and medication, including previous response to sedation in the nursing record. A full history and physical and physical was performed. I discussed the risks and benefits of the procedure with the patient including but not limited to infection, bleeding, perforation, medication reaction, and death. In addition I discussed that fine needle aspiration may result in an increased risk of infection, bleeding, perforation, and pancreatitis. The patient elected to have the procedure performed and informed consent was obtained. The patient was brought to the room, a time out was performed and the patient and staff were in agreement that it was the correct patient and procedure. Mallampatti: II  ASA Grade Assessment:3    The plan was to use MAC anesthesia. Vital signs and heart rhythm were monitored prior to and throughout the entire procedure. The patient was reassessed and then adequate sedation was then provided in stepwise fashion. The heart rate, respiratory rate, oxygen saturations, blood pressure, adequacy of pulmonary ventilation, and response to care were monitored throughout the procedure. The physical status of the patient was re-assessed after the procedure. The gastroscope and Olympus linear echoendoscope were introduced through the mouth, and advanced to the second part of duodenum. The upper EUS was accomplished without difficulty. The patient tolerated the procedure well. An endosonoscope with 7. 5MHz processor and color doppler were used throughout the procedure.   I performed real time sonographic image interpretation without the assistance of a radiologist.     There were no immediate apparent

## 2019-06-12 NOTE — PROGRESS NOTES
Shift assessment complete, see doc flow sheet. VSS. Assisted pt to ambulate three full laps in the hallway and sit up to the chair with no complications, pt tolerated well. Denies any abdominal pain. AM metoprolol and Cardizem given with a sip of water, see MAR. Pt remains NPO for EUS today. POC reviewed and mutually agreed and mutually agreed upon by the pt. Chair alarm on, call light within reach. Will continue to monitor. no shortness of breath.

## 2019-06-12 NOTE — PROGRESS NOTES
Pt sitting up eating lunch and tolerating a general diet. Family at bedside and updated on POC by . Plan for pt to be NPO tonight with probable ERCP tomorrow, pt and family agreeable to plan.
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
## 2019-06-12 NOTE — PROGRESS NOTES
Shift assessment completed. Medications given per MAR. Tylenol for headache. Patient up to chair and bathroom, and ambulated in hallway. Denies other needs. The care plan and education has been reviewed and mutually agreed upon with the patient.

## 2019-06-12 NOTE — PROGRESS NOTES
Pt arrived to PACU from Endoscopy in Stable condition and is RASS -1 (Drowsy) . Handoff received from [facility] and CRNA. Pt moves 4 extremities to command. Respirations are Regular Pattern; RR 8-20 = 0 on 4 O2 per nasal cannula. Skin warm, dry, and with normal, no cyanosis, jaundice, pallor or bruising color. Abd is  soft. Pain: denies at this time. Will continue to monitor for safety and comfort. S/P: EGD with EUS, with Dr. Maykel Marroquin at [facility].

## 2019-06-12 NOTE — PROGRESS NOTES
[facility]    6/12/2019 1:48 PM        Name: Mio Bailey . Admitted: 6/9/2019  Primary Care Provider: Juan Montilla MD (Tel: [phone])      Subjective:  . Seen in the early morning hours  No reports of pain  Currently NPO  Tolerated a diet yesterday     Reviewed interval ancillary notes    Current Medications    0.9 % sodium chloride infusion Continuous   pantoprazole (PROTONIX) tablet 40 mg BID AC   hydrALAZINE (APRESOLINE) injection 5 mg Q6H PRN   diltiazem (CARDIZEM CD) extended release capsule 240 mg Daily   escitalopram (LEXAPRO) tablet 10 mg Nightly   metoprolol succinate (TOPROL XL) extended release tablet 25 mg Daily   levothyroxine (SYNTHROID) tablet 100 mcg Daily   morphine (PF) injection 2 mg Q4H PRN   traMADol (ULTRAM) tablet 50 mg Q4H PRN   sodium chloride flush 0.9 % injection 10 mL 2 times per day   sodium chloride flush 0.9 % injection 10 mL PRN   magnesium hydroxide (MILK OF MAGNESIA) 400 MG/5ML suspension 30 mL Daily PRN   ondansetron (ZOFRAN) injection 4 mg Q6H PRN   enoxaparin (LOVENOX) injection 40 mg Daily   acetaminophen (TYLENOL) tablet 650 mg Q4H PRN   0.9 % sodium chloride infusion Continuous   pneumococcal 13-valent conjugate (PREVNAR) injection 0.5 mL Prior to discharge       Objective:  /87   Pulse 92   Temp 97.6 °F (36.4 °C) (Oral)   Resp 16   Ht 5' 5.5\" (1.664 m)   Wt 145 lb (65.8 kg)   SpO2 98%   BMI 23.76 kg/m²     Intake/Output Summary (Last 24 hours) at 6/12/2019 1348  Last data filed at 6/12/2019 1334  Gross per 24 hour   Intake 2052 ml   Output 2075 ml   Net -23 ml      Wt Readings from Last 3 Encounters:   06/09/19 145 lb (65.8 kg)   12/10/18 145 lb (65.8 kg)   08/27/18 143 lb (64.9 kg)       General appearance:  Appears comfortable while in bed. Looks younger than stated age. Jaundice noted   Eyes: Sclera clear.  Pupils equal.  ENT:

## 2019-06-13 ENCOUNTER — ANESTHESIA EVENT (OUTPATIENT)
Dept: ENDOSCOPY | Age: 84
DRG: 436 | End: 2019-06-13
Payer: MEDICARE

## 2019-06-13 ENCOUNTER — APPOINTMENT (OUTPATIENT)
Dept: GENERAL RADIOLOGY | Age: 84
DRG: 436 | End: 2019-06-13
Payer: MEDICARE

## 2019-06-13 ENCOUNTER — ANESTHESIA (OUTPATIENT)
Dept: ENDOSCOPY | Age: 84
DRG: 436 | End: 2019-06-13
Payer: MEDICARE

## 2019-06-13 VITALS
SYSTOLIC BLOOD PRESSURE: 143 MMHG | RESPIRATION RATE: 10 BRPM | OXYGEN SATURATION: 81 % | DIASTOLIC BLOOD PRESSURE: 95 MMHG

## 2019-06-13 LAB
ALBUMIN SERPL-MCNC: 3.4 G/DL (ref 3.4–5)
ALP BLD-CCNC: 213 U/L (ref 40–129)
ALT SERPL-CCNC: 83 U/L (ref 10–40)
AST SERPL-CCNC: 78 U/L (ref 15–37)
BILIRUB SERPL-MCNC: 13.2 MG/DL (ref 0–1)
BILIRUBIN DIRECT: >10 MG/DL (ref 0–0.3)
BILIRUBIN, INDIRECT: ABNORMAL MG/DL (ref 0–1)
TOTAL PROTEIN: 6.6 G/DL (ref 6.4–8.2)

## 2019-06-13 PROCEDURE — 6370000000 HC RX 637 (ALT 250 FOR IP): Performed by: INTERNAL MEDICINE

## 2019-06-13 PROCEDURE — 88305 TISSUE EXAM BY PATHOLOGIST: CPT

## 2019-06-13 PROCEDURE — 74330 X-RAY BILE/PANC ENDOSCOPY: CPT

## 2019-06-13 PROCEDURE — C1769 GUIDE WIRE: HCPCS | Performed by: INTERNAL MEDICINE

## 2019-06-13 PROCEDURE — 6360000002 HC RX W HCPCS: Performed by: NURSE ANESTHETIST, CERTIFIED REGISTERED

## 2019-06-13 PROCEDURE — 2500000003 HC RX 250 WO HCPCS: Performed by: NURSE ANESTHETIST, CERTIFIED REGISTERED

## 2019-06-13 PROCEDURE — C1874 STENT, COATED/COV W/DEL SYS: HCPCS | Performed by: INTERNAL MEDICINE

## 2019-06-13 PROCEDURE — 88104 CYTOPATH FL NONGYN SMEARS: CPT

## 2019-06-13 PROCEDURE — 6360000002 HC RX W HCPCS: Performed by: INTERNAL MEDICINE

## 2019-06-13 PROCEDURE — 2580000003 HC RX 258: Performed by: INTERNAL MEDICINE

## 2019-06-13 PROCEDURE — 3700000001 HC ADD 15 MINUTES (ANESTHESIA): Performed by: INTERNAL MEDICINE

## 2019-06-13 PROCEDURE — 1200000000 HC SEMI PRIVATE

## 2019-06-13 PROCEDURE — 3609014900 HC ERCP W/SPHINCTEROTOMY &/OR PAPILLOTOMY: Performed by: INTERNAL MEDICINE

## 2019-06-13 PROCEDURE — 3700000000 HC ANESTHESIA ATTENDED CARE: Performed by: INTERNAL MEDICINE

## 2019-06-13 PROCEDURE — 3609015100 HC ERCP STENT PLACEMENT BILIARY/PANCREATIC DUCT: Performed by: INTERNAL MEDICINE

## 2019-06-13 PROCEDURE — 80076 HEPATIC FUNCTION PANEL: CPT

## 2019-06-13 PROCEDURE — 0F798DZ DILATION OF COMMON BILE DUCT WITH INTRALUMINAL DEVICE, VIA NATURAL OR ARTIFICIAL OPENING ENDOSCOPIC: ICD-10-PCS | Performed by: INTERNAL MEDICINE

## 2019-06-13 PROCEDURE — 88112 CYTOPATH CELL ENHANCE TECH: CPT

## 2019-06-13 PROCEDURE — 7100000001 HC PACU RECOVERY - ADDTL 15 MIN: Performed by: INTERNAL MEDICINE

## 2019-06-13 PROCEDURE — 7100000000 HC PACU RECOVERY - FIRST 15 MIN: Performed by: INTERNAL MEDICINE

## 2019-06-13 PROCEDURE — 2709999900 HC NON-CHARGEABLE SUPPLY: Performed by: INTERNAL MEDICINE

## 2019-06-13 PROCEDURE — 2580000003 HC RX 258: Performed by: NURSE ANESTHETIST, CERTIFIED REGISTERED

## 2019-06-13 PROCEDURE — 6360000004 HC RX CONTRAST MEDICATION: Performed by: INTERNAL MEDICINE

## 2019-06-13 PROCEDURE — 36415 COLL VENOUS BLD VENIPUNCTURE: CPT

## 2019-06-13 PROCEDURE — 99232 SBSQ HOSP IP/OBS MODERATE 35: CPT | Performed by: SURGERY

## 2019-06-13 PROCEDURE — 3609014200 HC ERCP W/BIOPSY SINGLE/MULTIPLE: Performed by: INTERNAL MEDICINE

## 2019-06-13 DEVICE — STENT SYSTEM RMV
Type: IMPLANTABLE DEVICE | Status: FUNCTIONAL
Brand: WALLFLEX BILIARY

## 2019-06-13 RX ORDER — SODIUM CHLORIDE 0.9 % (FLUSH) 0.9 %
10 SYRINGE (ML) INJECTION EVERY 12 HOURS SCHEDULED
Status: DISCONTINUED | OUTPATIENT
Start: 2019-06-13 | End: 2019-06-14 | Stop reason: HOSPADM

## 2019-06-13 RX ORDER — ONDANSETRON 2 MG/ML
4 INJECTION INTRAMUSCULAR; INTRAVENOUS
Status: DISCONTINUED | OUTPATIENT
Start: 2019-06-13 | End: 2019-06-13 | Stop reason: HOSPADM

## 2019-06-13 RX ORDER — PANTOPRAZOLE SODIUM 40 MG/1
40 TABLET, DELAYED RELEASE ORAL
Qty: 30 TABLET | Refills: 3 | Status: SHIPPED | OUTPATIENT
Start: 2019-06-14 | End: 2020-10-16

## 2019-06-13 RX ORDER — SODIUM CHLORIDE 9 MG/ML
INJECTION, SOLUTION INTRAVENOUS CONTINUOUS PRN
Status: DISCONTINUED | OUTPATIENT
Start: 2019-06-13 | End: 2019-06-13 | Stop reason: SDUPTHER

## 2019-06-13 RX ORDER — ONDANSETRON 2 MG/ML
INJECTION INTRAMUSCULAR; INTRAVENOUS PRN
Status: DISCONTINUED | OUTPATIENT
Start: 2019-06-13 | End: 2019-06-13 | Stop reason: SDUPTHER

## 2019-06-13 RX ORDER — LIDOCAINE HYDROCHLORIDE 10 MG/ML
1 INJECTION, SOLUTION EPIDURAL; INFILTRATION; INTRACAUDAL; PERINEURAL
Status: ACTIVE | OUTPATIENT
Start: 2019-06-13 | End: 2019-06-13

## 2019-06-13 RX ORDER — LACTOBACILLUS RHAMNOSUS GG 10B CELL
1 CAPSULE ORAL
Status: DISCONTINUED | OUTPATIENT
Start: 2019-06-14 | End: 2019-06-14 | Stop reason: HOSPADM

## 2019-06-13 RX ORDER — SODIUM CHLORIDE 0.9 % (FLUSH) 0.9 %
10 SYRINGE (ML) INJECTION PRN
Status: DISCONTINUED | OUTPATIENT
Start: 2019-06-13 | End: 2019-06-14 | Stop reason: HOSPADM

## 2019-06-13 RX ORDER — LABETALOL HYDROCHLORIDE 5 MG/ML
5 INJECTION, SOLUTION INTRAVENOUS EVERY 10 MIN PRN
Status: DISCONTINUED | OUTPATIENT
Start: 2019-06-13 | End: 2019-06-13 | Stop reason: HOSPADM

## 2019-06-13 RX ORDER — CIPROFLOXACIN 2 MG/ML
400 INJECTION, SOLUTION INTRAVENOUS EVERY 12 HOURS
Status: DISCONTINUED | OUTPATIENT
Start: 2019-06-13 | End: 2019-06-14 | Stop reason: HOSPADM

## 2019-06-13 RX ORDER — CIPROFLOXACIN 500 MG/1
500 TABLET, FILM COATED ORAL 2 TIMES DAILY
Qty: 6 TABLET | Refills: 0 | Status: SHIPPED | OUTPATIENT
Start: 2019-06-13 | End: 2019-06-16

## 2019-06-13 RX ORDER — SODIUM CHLORIDE, SODIUM LACTATE, POTASSIUM CHLORIDE, CALCIUM CHLORIDE 600; 310; 30; 20 MG/100ML; MG/100ML; MG/100ML; MG/100ML
INJECTION, SOLUTION INTRAVENOUS CONTINUOUS
Status: DISCONTINUED | OUTPATIENT
Start: 2019-06-13 | End: 2019-06-13

## 2019-06-13 RX ORDER — FENTANYL CITRATE 50 UG/ML
INJECTION, SOLUTION INTRAMUSCULAR; INTRAVENOUS PRN
Status: DISCONTINUED | OUTPATIENT
Start: 2019-06-13 | End: 2019-06-13 | Stop reason: SDUPTHER

## 2019-06-13 RX ORDER — HYDRALAZINE HYDROCHLORIDE 20 MG/ML
5 INJECTION INTRAMUSCULAR; INTRAVENOUS EVERY 10 MIN PRN
Status: DISCONTINUED | OUTPATIENT
Start: 2019-06-13 | End: 2019-06-13 | Stop reason: HOSPADM

## 2019-06-13 RX ORDER — LIDOCAINE HYDROCHLORIDE 20 MG/ML
INJECTION, SOLUTION INFILTRATION; PERINEURAL PRN
Status: DISCONTINUED | OUTPATIENT
Start: 2019-06-13 | End: 2019-06-13 | Stop reason: SDUPTHER

## 2019-06-13 RX ORDER — INDOMETHACIN 25 MG/1
CAPSULE ORAL PRN
Status: DISCONTINUED | OUTPATIENT
Start: 2019-06-13 | End: 2019-06-13 | Stop reason: HOSPADM

## 2019-06-13 RX ORDER — PROPOFOL 10 MG/ML
INJECTION, EMULSION INTRAVENOUS PRN
Status: DISCONTINUED | OUTPATIENT
Start: 2019-06-13 | End: 2019-06-13 | Stop reason: SDUPTHER

## 2019-06-13 RX ORDER — DEXAMETHASONE SODIUM PHOSPHATE 4 MG/ML
INJECTION, SOLUTION INTRA-ARTICULAR; INTRALESIONAL; INTRAMUSCULAR; INTRAVENOUS; SOFT TISSUE PRN
Status: DISCONTINUED | OUTPATIENT
Start: 2019-06-13 | End: 2019-06-13 | Stop reason: SDUPTHER

## 2019-06-13 RX ORDER — FENTANYL CITRATE 50 UG/ML
25 INJECTION, SOLUTION INTRAMUSCULAR; INTRAVENOUS EVERY 5 MIN PRN
Status: DISCONTINUED | OUTPATIENT
Start: 2019-06-13 | End: 2019-06-13 | Stop reason: HOSPADM

## 2019-06-13 RX ORDER — OXYCODONE HYDROCHLORIDE AND ACETAMINOPHEN 5; 325 MG/1; MG/1
1 TABLET ORAL
Status: DISCONTINUED | OUTPATIENT
Start: 2019-06-13 | End: 2019-06-13 | Stop reason: HOSPADM

## 2019-06-13 RX ORDER — HYDROMORPHONE HCL 110MG/55ML
0.5 PATIENT CONTROLLED ANALGESIA SYRINGE INTRAVENOUS EVERY 5 MIN PRN
Status: DISCONTINUED | OUTPATIENT
Start: 2019-06-13 | End: 2019-06-13 | Stop reason: HOSPADM

## 2019-06-13 RX ORDER — EPHEDRINE SULFATE 50 MG/ML
INJECTION INTRAVENOUS PRN
Status: DISCONTINUED | OUTPATIENT
Start: 2019-06-13 | End: 2019-06-13 | Stop reason: SDUPTHER

## 2019-06-13 RX ORDER — PROCHLORPERAZINE EDISYLATE 5 MG/ML
5 INJECTION INTRAMUSCULAR; INTRAVENOUS
Status: DISCONTINUED | OUTPATIENT
Start: 2019-06-13 | End: 2019-06-13 | Stop reason: HOSPADM

## 2019-06-13 RX ADMIN — PROPOFOL 25 MG: 10 INJECTION, EMULSION INTRAVENOUS at 12:52

## 2019-06-13 RX ADMIN — EPHEDRINE SULFATE 7.5 MG: 50 INJECTION, SOLUTION INTRAVENOUS at 13:03

## 2019-06-13 RX ADMIN — PHENYLEPHRINE HYDROCHLORIDE 100 MCG: 10 INJECTION INTRAVENOUS at 12:53

## 2019-06-13 RX ADMIN — EPHEDRINE SULFATE 7.5 MG: 50 INJECTION, SOLUTION INTRAVENOUS at 12:52

## 2019-06-13 RX ADMIN — PANTOPRAZOLE SODIUM 40 MG: 40 TABLET, DELAYED RELEASE ORAL at 15:19

## 2019-06-13 RX ADMIN — DEXAMETHASONE SODIUM PHOSPHATE 4 MG: 4 INJECTION, SOLUTION INTRAMUSCULAR; INTRAVENOUS at 12:54

## 2019-06-13 RX ADMIN — PROPOFOL 100 MG: 10 INJECTION, EMULSION INTRAVENOUS at 12:48

## 2019-06-13 RX ADMIN — SODIUM CHLORIDE: 9 INJECTION, SOLUTION INTRAVENOUS at 02:33

## 2019-06-13 RX ADMIN — LIDOCAINE HYDROCHLORIDE 100 MG: 20 INJECTION, SOLUTION INFILTRATION; PERINEURAL at 12:49

## 2019-06-13 RX ADMIN — FENTANYL CITRATE 25 MCG: 50 INJECTION, SOLUTION INTRAMUSCULAR; INTRAVENOUS at 12:47

## 2019-06-13 RX ADMIN — PANTOPRAZOLE SODIUM 40 MG: 40 TABLET, DELAYED RELEASE ORAL at 10:30

## 2019-06-13 RX ADMIN — CIPROFLOXACIN 400 MG: 2 INJECTION, SOLUTION INTRAVENOUS at 10:33

## 2019-06-13 RX ADMIN — ONDANSETRON 4 MG: 2 INJECTION INTRAMUSCULAR; INTRAVENOUS at 12:55

## 2019-06-13 RX ADMIN — SODIUM CHLORIDE: 9 INJECTION, SOLUTION INTRAVENOUS at 12:23

## 2019-06-13 RX ADMIN — PHENYLEPHRINE HYDROCHLORIDE 100 MCG: 10 INJECTION INTRAVENOUS at 13:14

## 2019-06-13 RX ADMIN — PHENYLEPHRINE HYDROCHLORIDE 100 MCG: 10 INJECTION INTRAVENOUS at 13:04

## 2019-06-13 RX ADMIN — METOPROLOL SUCCINATE 25 MG: 50 TABLET, FILM COATED, EXTENDED RELEASE ORAL at 10:30

## 2019-06-13 RX ADMIN — LEVOTHYROXINE SODIUM 100 MCG: 100 TABLET ORAL at 10:31

## 2019-06-13 RX ADMIN — Medication 10 ML: at 10:33

## 2019-06-13 RX ADMIN — DILTIAZEM HYDROCHLORIDE 240 MG: 240 CAPSULE, COATED, EXTENDED RELEASE ORAL at 10:30

## 2019-06-13 RX ADMIN — EPHEDRINE SULFATE 5 MG: 50 INJECTION, SOLUTION INTRAVENOUS at 13:15

## 2019-06-13 RX ADMIN — ESCITALOPRAM OXALATE 10 MG: 10 TABLET ORAL at 21:48

## 2019-06-13 RX ADMIN — CIPROFLOXACIN 400 MG: 2 INJECTION, SOLUTION INTRAVENOUS at 21:48

## 2019-06-13 ASSESSMENT — PULMONARY FUNCTION TESTS
PIF_VALUE: 25
PIF_VALUE: 25
PIF_VALUE: 26
PIF_VALUE: 19
PIF_VALUE: 24
PIF_VALUE: 23
PIF_VALUE: 24
PIF_VALUE: 19
PIF_VALUE: 5
PIF_VALUE: 0
PIF_VALUE: 15
PIF_VALUE: 10
PIF_VALUE: 24
PIF_VALUE: 20
PIF_VALUE: 26
PIF_VALUE: 24
PIF_VALUE: 1
PIF_VALUE: 23
PIF_VALUE: 25
PIF_VALUE: 0
PIF_VALUE: 21
PIF_VALUE: 25
PIF_VALUE: 22
PIF_VALUE: 26
PIF_VALUE: 19
PIF_VALUE: 25
PIF_VALUE: 18
PIF_VALUE: 29
PIF_VALUE: 26
PIF_VALUE: 27
PIF_VALUE: 25
PIF_VALUE: 0
PIF_VALUE: 19
PIF_VALUE: 26
PIF_VALUE: 19
PIF_VALUE: 0
PIF_VALUE: 24
PIF_VALUE: 25
PIF_VALUE: 14
PIF_VALUE: 24
PIF_VALUE: 4
PIF_VALUE: 26
PIF_VALUE: 22
PIF_VALUE: 27
PIF_VALUE: 17
PIF_VALUE: 24
PIF_VALUE: 26
PIF_VALUE: 1
PIF_VALUE: 23
PIF_VALUE: 21
PIF_VALUE: 23
PIF_VALUE: 24
PIF_VALUE: 24
PIF_VALUE: 20
PIF_VALUE: 8
PIF_VALUE: 17
PIF_VALUE: 19
PIF_VALUE: 21
PIF_VALUE: 23
PIF_VALUE: 23
PIF_VALUE: 6
PIF_VALUE: 23
PIF_VALUE: 6
PIF_VALUE: 26
PIF_VALUE: 25
PIF_VALUE: 16
PIF_VALUE: 24
PIF_VALUE: 25

## 2019-06-13 ASSESSMENT — ENCOUNTER SYMPTOMS: SHORTNESS OF BREATH: 0

## 2019-06-13 ASSESSMENT — PAIN - FUNCTIONAL ASSESSMENT: PAIN_FUNCTIONAL_ASSESSMENT: 0-10

## 2019-06-13 ASSESSMENT — PAIN SCALES - GENERAL
PAINLEVEL_OUTOF10: 0
PAINLEVEL_OUTOF10: 0

## 2019-06-13 NOTE — OP NOTE
common hepatic duct and intrahepatic ducts were dilated. The cystic duct and gallbladder did not fill. Pancreatogram:  The main pancreatic duct was incidentally opacified to the body and appeared dilated with dominant stenosis in head. Brief attempts to traverse the stenosis failed. A 10 mm biliary sphinctertomy was performed in the 12 o'clock direction using the tapered sphincterotome and blended cautery current over a guidewire. Brushings for cytology and biopsies (using \"side-bite\" forceps) for pathology were obtained from the stenosis. A 10 mm width x 40 mm length fully-covered expandable metal mesh WallFlex Biliary stent was successfully deployed across the common bile duct stenosis and bridging the biliary sphincterotomy into the duodenum. Good bile flow through the stent into the duodenum was noted at procedure end. The cannulas were then withdrawn. The duodenum and stomach were decompressed and the scope was withdrawn from the patient. The patient tolerated the procedure well and was taken to the post anesthesia care unit in good condition. Radiological Interpretation:  All fluoroscopic images and 7 still x-ray films were carefullly examined and interpreted by the endoscopist on the spot during the procedure in the absence of radiologist.  These interpretations guided the course of the procedure and the interventions mentioned above and below. 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 5) Fully-covered expandable metal mesh stent       Recommendations: 1) Clear liquid diet today and advance to low fat diet in AM as tolerated. 2) Check pathology results. 3) Continue Cipro x 3 days (can start po when taking po.). 4) Follow up as inpatient.     Janna Mcbride MD  [facility]  6/13/2019

## 2019-06-13 NOTE — PROGRESS NOTES
Patient arrived from endo to pacu. arousable to voice. On 15L simple mask. VSS.  Will continue to monitor    Electronically signed by Cynthia Guzmán RN on 6/13/2019 at [phone]

## 2019-06-13 NOTE — PROGRESS NOTES
1000 MCG tablet Take 1,000 mcg by mouth daily   Yes Historical Provider, MD   Vitamin D-Vitamin K (VITAMIN K2-VITAMIN D3)  MCG-UNIT CAPS Take 1 tablet by mouth daily   Yes Historical Provider, MD   CHELATED MAGNESIUM PO Take 2 tablets by mouth daily   Yes Historical Provider, MD   escitalopram (LEXAPRO) 10 MG tablet  1/3/17  Yes Historical Provider, MD   SYNTHROID 50 MCG tablet Take 100 mcg by mouth Daily  2/23/17  Yes Historical Provider, MD   diltiazem (CARTIA XT) 240 MG extended release capsule Take 240 mg by mouth daily   Yes Historical Provider, MD   omega-3 acid ethyl esters (LOVAZA) 1 G capsule Take 2 g by mouth daily. Yes Historical Provider, MD   meloxicam (MOBIC) 15 MG tablet Take 15 mg by mouth daily. Yes Historical Provider, MD   estrogens, conjugated, (PREMARIN) 0.625 MG tablet Take 0.625 mg by mouth daily. Yes Historical Provider, MD   metoprolol (TOPROL-XL) 25 MG XL tablet Take 25 mg by mouth daily    Yes Historical Provider, MD   Coenzyme Q10 (CO Q-10) 300 MG CAPS Take 1 tablet by mouth Daily. Yes Historical Provider, MD   Glucosamine-Chondroit-Vit C-Mn (GLUCOSAMINE 1500 COMPLEX PO) Take 1,500 mg by mouth 3 times daily. Yes Historical Provider, MD   NONFORMULARY 2 capsules daily at 1800 cellgevity 2 caps daily, vision 2 tabs daily, move free triple strength 2 daily,bowel support formula 1 daily, omega support 2 daily. Foundation vitamin formula 1 daily. Yes Historical Provider, MD   hydrochlorothiazide (HYDRODIURIL) 25 MG tablet  2/20/17   Historical Provider, MD   oxyCODONE-acetaminophen (PERCOCET) 5-325 MG per tablet . 1/3/17   Historical Provider, MD   paregoric 2 MG/5ML solution Prn for diarrhea 1/3/17   Historical Provider, MD   triamcinolone (KENALOG) 0.1 % cream Apply topically both ear canals with q tip every night. 2/7/17   Beverly Ascencio MD        Allergies:  Sulfa antibiotics    Social History:    reports that she has never smoked.  She has never used smokeless tobacco. She reports that she does not drink alcohol or use drugs. Family History:    History reviewed. No pertinent family history. REVIEW OF SYSTEMS:  CONSTITUTIONAL:  negative  HEENT:  negative  RESPIRATORY:  negative  CARDIOVASCULAR:  negative  GASTROINTESTINAL:  negative except for jaundice  GENITOURINARY:  negative  HEMATOLOGIC/LYMPHATIC:  negative  NEUROLOGICAL:  negative  SKIN: negative      OBJECTIVE:  VITALS:  BP (!) 152/78   Pulse 98   Temp 97.5 °F (36.4 °C) (Oral)   Resp 14   Ht 5' 5.5\" (1.664 m)   Wt 145 lb (65.8 kg)   SpO2 98%   BMI 23.76 kg/m²     INTAKE/OUTPUT:    I/O last 3 completed shifts: In: 240 [P.O.:240]  Out: 1450 [Urine:1450]  No intake/output data recorded. CONSTITUTIONAL:  awake and alert  LUNGS:  clear to auscultation  HEART: RRR  ABDOMEN:  normal bowel sounds, soft, non-distended, non-tender         Data:  CBC:   Recent Labs     06/12/19  0639   WBC 3.7*   HGB 13.0   HCT 38.7        BMP:    No results for input(s): NA, K, CL, CO2, BUN, CREATININE, GLUCOSE in the last 72 hours. Hepatic:   Recent Labs     06/12/19  0639 06/13/19  0644   AST 58* 78*   ALT 77* 83*   BILITOT 11.2* 13.2*   ALKPHOS 190* 213*     Mag:    No results for input(s): MG in the last 72 hours. Phos:     No results for input(s): PHOS in the last 72 hours. INR:   No results for input(s): INR in the last 72 hours. Radiology Review:   MRI ABDOMEN WO CONTRAST MRCP [[id number]] Collected: 06/10/19 2105   Updated: 06/10/19 2119    Narrative:     EXAMINATION:  MRCP 6/10/2019 7:39 pm    TECHNIQUE:  After initial T2 axial and coronal images, thick slab, thin slab and 3D  coronal MRCP sequences were obtained without the administration of  intravenous contrast.  MIP images are provided for review. COMPARISON:  Ultrasound and CT on 06/09/2019    HISTORY:  Abnormal LFTs.  Biliary ductal dilatation.     FINDINGS:  Gallbladder: Gallbladder is distended and contains mild sludge.  No  significant gallbladder wall thickening or pericholecystic fluid. Bile Ducts: Moderate intrahepatic and marked extrahepatic biliary ductal  dilatation with the common bile duct measuring 2 cm in caliber.  There is  abrupt termination of the distal common bile duct with a subtle ampullary  mass measuring 1.4 x 1.1 cm. Pancreatic Duct: No significant pancreatic ductal dilatation.  No pancreas  divisum. Other:  7 cm right renal cyst with tiny left renal cyst.  No hydronephrosis. No hepatic steatosis.  No adrenal nodule.  Moderate hiatal hernia.  Colonic  diverticulosis. 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  Suggest ERCP for further  evaluation. FL ERCP BILIARY AND PANCREATIC S&I [[id number]] Collected: 06/13/19 1351 Updated: 06/13/19 1350 Narrative:   EXAMINATION:  7 SPOT IMAGES FROM AN ERCP    COMPARISON:  None    FLUOROSCOPY DOSE OR TIME/IMAGES:  Left 16.0 minutes, 7 images    HISTORY:  ORDERING SYSTEM PROVIDED HISTORY: pain  TECHNOLOGIST PROVIDED HISTORY:  Reason for exam:->pain    FINDINGS:  Endoscopy and cannulation of the major papilla was performed by the  gastroenterology service under the supervision of [address] [place] [address]. Images  demonstrate access to the biliary system with injection of contrast and  subsequent placement of a stent within the lower common bile duct which  appears to extend into the duodenum. Impression:   Unremarkable ERCP images. Please refer to the procedure report for further  details. ASSESSMENT AND PLAN:  Ampullary mass  Obstructive jaundice  Cholelithiasis    EUS, bx, ERCP, brushing and stent are done  Cipro X 3  Diet advance  DC home tomorrow  Tx Plans / options DW pt daily, and again prior to ERCP today.  Reviewed with Dr. Josué Davis, pt's son in law this afternoon as well  At this point planning exploration and resection ([place]) if appropriate       Karthikeyan [place] Floresita Carter

## 2019-06-13 NOTE — PROGRESS NOTES
Assessment complete. See flow sheet. Pain evaluation complete. No complaints of pain at this time. Discussed POC for this shift. Pt did four laps in the hallway with rolling walker. Patient encouraged to use call light with any needs. Patient states understanding, call light in reach, bed alarm on. Will continue to monitor.

## 2019-06-13 NOTE — PROGRESS NOTES
Per Richard Hathaway RN, in pre-op anesthesia wants pt to take Protonix, Synthroid, metoprolol, and diltiazem. Will proceed.

## 2019-06-13 NOTE — PROGRESS NOTES
Patient's awake and alert. On room air. VSS. Denies pain. Tolerating water. Patient meets criteria to be discharged from phase 1.  Will prepare for transfer to room    Electronically signed by Kashmir Dunham RN on 6/13/2019 at [id number] [phone]

## 2019-06-13 NOTE — PROGRESS NOTES
Pt up to BR for urine occurrence with assistance from Belem Graham. Beau Chaudhari 118. Pt tolerated well. Returned to chair. Pt demonstrated how to reach nurse with call light. Call light in reach. Will continue to monitor.

## 2019-06-13 NOTE — PROGRESS NOTES
Summary (Last 24 hours) at 6/13/2019 1441  Last data filed at 6/13/2019 9467  Gross per 24 hour   Intake 240 ml   Output 1450 ml   Net -1210 ml      Wt Readings from Last 3 Encounters:   06/09/19 145 lb (65.8 kg)   12/10/18 145 lb (65.8 kg)   08/27/18 143 lb (64.9 kg)       General appearance:  Appears comfortable while up in chair. Looks younger than stated age. Jaundice noted   Eyes: Sclera clear. Pupils equal.  ENT: Moist oral mucosa. Trachea midline, no adenopathy. Cardiovascular: Regular rhythm, normal S1, S2. No murmur. No edema in lower extremities  Respiratory: Not using accessory muscles. Good inspiratory effort. Clear to auscultation bilaterally, no wheeze or crackles. GI: Abdomen soft, no tenderness, not distended, normal bowel sounds, no guarding   Musculoskeletal: No cyanosis in digits, neck supple  Neurology: CN 2-12 grossly intact. No speech or motor deficits  Psych: Normal affect. Alert and oriented in time, place and person  Skin: Warm, dry, normal turgor    Labs and Tests:  CBC:   Recent Labs     06/12/19  0639   WBC 3.7*   HGB 13.0        BMP:    No results for input(s): NA, K, CL, CO2, BUN, CREATININE, GLUCOSE in the last 72 hours. 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  Suggest ERCP for further  evaluation. Problem List  Active Problems:    Hepatitis    Obstructive jaundice  Resolved Problems:    * No resolved hospital problems. *       Assessment & Plan:   1. Painless jaundice:,  Likely due to pancreatic cancer . Pt is aware of this diagnosis.   She will go for palliative stent placement later today with Dr Mack Prader. Will ask oncology to also evaluate pt to help her in the decision making process. 2. HTN:  bp adequate on home therapy of BB and CCB  3. Gastric ulcers noted on EGD:  Now on bid PPI   4. Hypothyroidism:  TSH was in range in March 2019. Continue home therapy   5. Will need 3 days of cipro at d/c.  6. Consider early am discharge if she is tolerating a diet and pain free. ..... she has hair appointment at 12:30 that she does not want to miss.        Diet: DIET CLEAR LIQUID;  Code:Full Code  DVT PPX      AME Davenport - CNP   6/13/2019 2:41 PM

## 2019-06-13 NOTE — PROGRESS NOTES
Pt returned to bed from chair. Tolerated well. Pt agitated that dietary has not delivered broth yet. Pt assured that order was placed and that it should be delivered promptly. All clear liquid options on floor were offered while we waited. Pt refused as she only wanted broth. Pt demonstrated how to reach nurse with call light. Call light in reach. Will continue to monitor.

## 2019-06-13 NOTE — PROGRESS NOTES
Hourly rounding performed on pt:  Pain: controlled, pt in bed with eyes closed and RR>10  Position: appears in no distress  Restroom: Pt clean and no need to void at this time  Proximity: call light, phone, bedside table in reach  Will continue to monitor

## 2019-06-13 NOTE — PROGRESS NOTES
This RN transferred patient to room.  Bedside handoff with Edi Guajardo    Electronically signed by Dinorah Dawn RN on 6/13/2019 at 2:38 PM

## 2019-06-13 NOTE — ANESTHESIA POSTPROCEDURE EVALUATION
Department of Anesthesiology  Postprocedure Note    Patient: Alan Maldonado  MRN: [id number]  YOB: 1928  Date of evaluation: 6/13/2019  Time:  11:36 AM     Procedure Summary     Date:  06/12/19 Room / Location:  [facility] ENDO 05 / [facility]    Anesthesia Start:  1126 Anesthesia Stop:  9547    Procedures:       EGD W/EUS FNA (N/A )      EGD BIOPSY (N/A Abdomen) Diagnosis:  (obstructive jaundice)    Surgeon:  Damian Green MD Responsible Provider:  Avery Mercedes MD    Anesthesia Type:  MAC ASA Status:  3          Anesthesia Type: MAC    Farrah Phase I: Farrah Score: 10    Farrah Phase II:      Last vitals: Reviewed and per EMR flowsheets.        Anesthesia Post Evaluation    Patient location during evaluation: PACU  Patient participation: complete - patient participated  Level of consciousness: awake  Airway patency: patent  Nausea & Vomiting: no nausea and no vomiting  Complications: no  Cardiovascular status: blood pressure returned to baseline  Respiratory status: acceptable  Hydration status: euvolemic

## 2019-06-13 NOTE — ANESTHESIA PRE PROCEDURE
Department of Anesthesiology  Preprocedure Note       Name:  Cade Herrera   Age:  80 y.o.  :  1928                                          MRN:  [id number]         Date:  2019      Surgeon: Bhargav Mejia):  Lakisha Messina MD    Procedure: ERCP DIAGNOSTIC (N/A )    Medications prior to admission:   Prior to Admission medications    Medication Sig Start Date End Date Taking? Authorizing Provider   vitamin B-12 (CYANOCOBALAMIN) 1000 MCG tablet Take 1,000 mcg by mouth daily    Historical Provider, MD   Vitamin D-Vitamin K (VITAMIN K2-VITAMIN D3)  MCG-UNIT CAPS Take 1 tablet by mouth daily    Historical Provider, MD   CHELATED MAGNESIUM PO Take 2 tablets by mouth daily    Historical Provider, MD   escitalopram (LEXAPRO) 10 MG tablet  1/3/17   Historical Provider, MD   hydrochlorothiazide (HYDRODIURIL) 25 MG tablet  17   Historical Provider, MD   SYNTHROID 50 MCG tablet Take 100 mcg by mouth Daily  17   Historical Provider, MD   oxyCODONE-acetaminophen (PERCOCET) 5-325 MG per tablet . 1/3/17   Historical Provider, MD   paregoric 2 MG/5ML solution Prn for diarrhea 1/3/17   Historical Provider, MD   diltiazem (CARTIA XT) 240 MG extended release capsule Take 240 mg by mouth daily    Historical Provider, MD   triamcinolone (KENALOG) 0.1 % cream Apply topically both ear canals with q tip every night. 17   Patrica Gaytan MD   omega-3 acid ethyl esters (LOVAZA) 1 G capsule Take 2 g by mouth daily. Historical Provider, MD   meloxicam (MOBIC) 15 MG tablet Take 15 mg by mouth daily. Historical Provider, MD   estrogens, conjugated, (PREMARIN) 0.625 MG tablet Take 0.625 mg by mouth daily. Historical Provider, MD   metoprolol (TOPROL-XL) 25 MG XL tablet Take 25 mg by mouth daily     Historical Provider, MD   Coenzyme Q10 (CO Q-10) 300 MG CAPS Take 1 tablet by mouth Daily.     Historical Provider, MD   Glucosamine-Chondroit-Vit C-Mn (GLUCOSAMINE 1500 COMPLEX PO) Take 1,500 mg by mouth 3 times daily. Historical Provider, MD   NONFORMULARY 2 capsules daily at 1800 cellgevity 2 caps daily, vision 2 tabs daily, move free triple strength 2 daily,bowel support formula 1 daily, omega support 2 daily. Foundation vitamin formula 1 daily. Historical Provider, MD       Current medications:    No current facility-administered medications for this visit. No current outpatient medications on file.      Facility-Administered Medications Ordered in Other Visits   Medication Dose Route Frequency Provider Last Rate Last Dose    ciprofloxacin (CIPRO) IVPB 400 mg  400 mg Intravenous Q12H Nils Gomez  mL/hr at 06/13/19 1033 400 mg at 06/13/19 1033    0.9 % sodium chloride infusion   Intravenous Continuous Darrel Mascorro  mL/hr at 06/13/19 0233      pantoprazole (PROTONIX) tablet 40 mg  40 mg Oral BID AC Darrel Mascorro MD   40 mg at 06/13/19 1030    hydrALAZINE (APRESOLINE) injection 5 mg  5 mg Intravenous Q6H PRN Darrel Mascorro MD   5 mg at 06/11/19 0533    diltiazem (CARDIZEM CD) extended release capsule 240 mg  240 mg Oral Daily Darrel Mascorro MD   240 mg at 06/13/19 1030    escitalopram (LEXAPRO) tablet 10 mg  10 mg Oral Nightly Darrel Mascorro MD   10 mg at 06/12/19 2035    metoprolol succinate (TOPROL XL) extended release tablet 25 mg  25 mg Oral Daily Darrel Mascorro MD   25 mg at 06/13/19 1030    levothyroxine (SYNTHROID) tablet 100 mcg  100 mcg Oral Daily Darrel Mascorro MD   100 mcg at 06/13/19 1031    morphine (PF) injection 2 mg  2 mg Intravenous Q4H PRN Darrel Mascorro MD        traMADol [employer]) tablet 50 mg  50 mg Oral Q4H PRN Darrel Mascorro MD        sodium chloride flush 0.9 % injection 10 mL  10 mL Intravenous 2 times per day Darrel Mascorro MD   10 mL at 06/13/19 1033    sodium chloride flush 0.9 % injection 10 mL  10 mL Intravenous PRN Darrel Mascorro MD        magnesium hydroxide (MILK OF MAGNESIA) 400 MG/5ML suspension 30 BP Readings from Last 3 Encounters:   06/13/19 (!) 170/96   06/12/19 (!) 108/59   12/10/18 135/60       NPO Status:                                                                                 BMI:   Wt Readings from Last 3 Encounters:   06/09/19 145 lb (65.8 kg)   12/10/18 145 lb (65.8 kg)   08/27/18 143 lb (64.9 kg)     There is no height or weight on file to calculate BMI.    CBC:   Lab Results   Component Value Date    WBC 3.7 06/12/2019    RBC 4.32 06/12/2019    HGB 13.0 06/12/2019    HCT 38.7 06/12/2019    MCV 89.6 06/12/2019    RDW 21.5 06/12/2019     06/12/2019       CMP:   Lab Results   Component Value Date     06/09/2019    K 3.7 06/09/2019     06/09/2019    CO2 24 06/09/2019    BUN 16 06/09/2019    CREATININE <0.5 06/09/2019    GFRAA >60 06/09/2019    GFRAA >60 04/30/2013    AGRATIO 1.3 06/09/2019    LABGLOM >60 06/09/2019    GLUCOSE 104 06/09/2019    PROT 6.6 06/13/2019    PROT 7.4 11/07/2012    CALCIUM 8.8 06/09/2019    BILITOT 13.2 06/13/2019    ALKPHOS 213 06/13/2019    AST 78 06/13/2019    ALT 83 06/13/2019       POC Tests: No results for input(s): POCGLU, POCNA, POCK, POCCL, POCBUN, POCHEMO, POCHCT in the last 72 hours.     Coags:   Lab Results   Component Value Date    PROTIME 12.3 06/09/2019    INR 1.08 06/09/2019    APTT 33.9 06/09/2019       HCG (If Applicable): No results found for: PREGTESTUR, PREGSERUM, HCG, HCGQUANT     ABGs: No results found for: PHART, PO2ART, HRA4UZE, CYD7UCT, BEART, I5CRWDRS     Type & Screen (If Applicable):  No results found for: [facility]    Anesthesia Evaluation  Patient summary reviewed and Nursing notes reviewed no history of anesthetic complications:   Airway: Mallampati: II  TM distance: <3 FB     Mouth opening: > = 3 FB Dental:    (+) partials      Pulmonary:       (-) shortness of breath                           Cardiovascular:  Exercise tolerance: good (>4 METS),   (+) hypertension:, dysrhythmias: SVT,       ECG reviewed  Rhythm: regular  Rate: normal           Beta Blocker:  Dose within 24 Hrs      ROS comment: EKG 12 Lead   Study Date: 06/10/2019   Ever Valdez 80 y.o. Reading Provider Eleni Cerna MD  Ordering Provider Jimmie Louie DO  Result Information     Status: Final result (Resulted: 6/10/2019 17:09) Provider Status: Ordered  Order Questions     Question Answer Comment  Reason for Exam? Other liver failure       Signed     Electronically signed by Eleni Cerna MD on 6/10/19 at 9749 0982 EDT  6/10/2019  5:10 PM - Anthony, Tjh Incoming Muse Results     Component Value Ref Range & Units Status Collected Lab  Ventricular Rate 98  BPM Final 06/09/2019  5:22 PM 14  Atrial Rate 277  BPM Final 06/09/2019  5:22 PM 14  QRS Duration 80  ms Final 06/09/2019  5:22 PM 14  Q-T Interval 384  ms Final 06/09/2019  5:22 PM 14  QTc Calculation (Bazett) 490  ms Final 06/09/2019  5:22 PM 14  R Axis 24  degrees Final 06/09/2019  5:22 PM 14  T Axis -5  degrees Final 06/09/2019  5:22 PM 14  Diagnosis   Final 06/09/2019  5:22 PM 14  Accelerated Junctional rhythm or psvt   with occasional Premature ventricular complexes. Poor data quality, interpretation may be adversely affectedAbnormal ECGConfirmed by [place] FRANKLIN UGARTE, Nicko Lester ([id number]) on 6/10/2019 5:09:56 PM         Neuro/Psych:               GI/Hepatic/Renal:   (+) liver disease ( elevated liver enzymes - obstructive jaundice ):,          ROS comment: CT with biliary dilation with abrupt narrowing of distal CBD and gallstones on imaging. MRCP with ampullary mass noted. Plan for EUS and possible follow up ERCP. .   Endo/Other:    (+) hypothyroidism (stable on synthroid): arthritis: OA., .                 Abdominal:         (-) obese     Vascular:                                          Anesthesia Plan      general     ASA 3       Induction: intravenous. Anesthetic plan and risks discussed with patient. Use of blood products discussed with patient whom.    Plan discussed with Priti Landry MD   6/13/2019

## 2019-06-14 VITALS
HEIGHT: 66 IN | RESPIRATION RATE: 16 BRPM | OXYGEN SATURATION: 98 % | HEART RATE: 90 BPM | WEIGHT: 145 LBS | DIASTOLIC BLOOD PRESSURE: 68 MMHG | TEMPERATURE: 97.7 F | SYSTOLIC BLOOD PRESSURE: 133 MMHG | BODY MASS INDEX: 23.3 KG/M2

## 2019-06-14 LAB
ALBUMIN SERPL-MCNC: 3.2 G/DL (ref 3.4–5)
ALP BLD-CCNC: 209 U/L (ref 40–129)
ALT SERPL-CCNC: 82 U/L (ref 10–40)
AST SERPL-CCNC: 70 U/L (ref 15–37)
BILIRUB SERPL-MCNC: 14 MG/DL (ref 0–1)
BILIRUBIN DIRECT: >10 MG/DL (ref 0–0.3)
BILIRUBIN, INDIRECT: ABNORMAL MG/DL (ref 0–1)
TOTAL PROTEIN: 6 G/DL (ref 6.4–8.2)

## 2019-06-14 PROCEDURE — 6370000000 HC RX 637 (ALT 250 FOR IP): Performed by: INTERNAL MEDICINE

## 2019-06-14 PROCEDURE — 36415 COLL VENOUS BLD VENIPUNCTURE: CPT

## 2019-06-14 PROCEDURE — 2580000003 HC RX 258: Performed by: ANESTHESIOLOGY

## 2019-06-14 PROCEDURE — 2580000003 HC RX 258: Performed by: INTERNAL MEDICINE

## 2019-06-14 PROCEDURE — 80076 HEPATIC FUNCTION PANEL: CPT

## 2019-06-14 PROCEDURE — 99231 SBSQ HOSP IP/OBS SF/LOW 25: CPT | Performed by: SURGERY

## 2019-06-14 PROCEDURE — 6360000002 HC RX W HCPCS: Performed by: INTERNAL MEDICINE

## 2019-06-14 PROCEDURE — 94760 N-INVAS EAR/PLS OXIMETRY 1: CPT

## 2019-06-14 RX ADMIN — LEVOTHYROXINE SODIUM 100 MCG: 100 TABLET ORAL at 05:20

## 2019-06-14 RX ADMIN — PANTOPRAZOLE SODIUM 40 MG: 40 TABLET, DELAYED RELEASE ORAL at 05:21

## 2019-06-14 RX ADMIN — DILTIAZEM HYDROCHLORIDE 240 MG: 240 CAPSULE, COATED, EXTENDED RELEASE ORAL at 09:41

## 2019-06-14 RX ADMIN — METOPROLOL SUCCINATE 25 MG: 50 TABLET, FILM COATED, EXTENDED RELEASE ORAL at 09:40

## 2019-06-14 RX ADMIN — Medication 10 ML: at 00:02

## 2019-06-14 RX ADMIN — CIPROFLOXACIN 400 MG: 2 INJECTION, SOLUTION INTRAVENOUS at 09:41

## 2019-06-14 RX ADMIN — Medication 1 CAPSULE: at 09:39

## 2019-06-14 RX ADMIN — Medication 10 ML: at 09:41

## 2019-06-14 NOTE — PROGRESS NOTES
[facility]   Progress Note      6/14/2019 9:37 AM        Name: Josefa Wiley . Admitted: 6/9/2019    SUBJECTIVE:  She feels well, denies pain, no n/v. She is looking forward to discharge today. Reviewed interval ancillary notes    Current Medications    ciprofloxacin (CIPRO) IVPB 400 mg Q12H   sodium chloride flush 0.9 % injection 10 mL 2 times per day   sodium chloride flush 0.9 % injection 10 mL PRN   lactobacillus (CULTURELLE) capsule 1 capsule Daily with breakfast   0.9 % sodium chloride infusion Continuous   pantoprazole (PROTONIX) tablet 40 mg BID AC   hydrALAZINE (APRESOLINE) injection 5 mg Q6H PRN   diltiazem (CARDIZEM CD) extended release capsule 240 mg Daily   escitalopram (LEXAPRO) tablet 10 mg Nightly   metoprolol succinate (TOPROL XL) extended release tablet 25 mg Daily   levothyroxine (SYNTHROID) tablet 100 mcg Daily   morphine (PF) injection 2 mg Q4H PRN   traMADol (ULTRAM) tablet 50 mg Q4H PRN   sodium chloride flush 0.9 % injection 10 mL 2 times per day   sodium chloride flush 0.9 % injection 10 mL PRN   magnesium hydroxide (MILK OF MAGNESIA) 400 MG/5ML suspension 30 mL Daily PRN   ondansetron (ZOFRAN) injection 4 mg Q6H PRN   enoxaparin (LOVENOX) injection 40 mg Daily   acetaminophen (TYLENOL) tablet 650 mg Q4H PRN   pneumococcal 13-valent conjugate (PREVNAR) injection 0.5 mL Prior to discharge       Objective:  /80   Pulse 92   Temp 97.3 °F (36.3 °C) (Oral)   Resp 16   Ht 5' 5.5\" (1.664 m)   Wt 145 lb (65.8 kg)   SpO2 97%   BMI 23.76 kg/m²     Intake/Output Summary (Last 24 hours) at 6/14/2019 0937  Last data filed at 6/14/2019 0503  Gross per 24 hour   Intake 1000 ml   Output 500 ml   Net 500 ml    Wt Readings from Last 3 Encounters:   06/09/19 145 lb (65.8 kg)   12/10/18 145 lb (65.8 kg)   08/27/18 143 lb (64.9 kg)       General appearance:  Appears comfortable  Eyes: Sclera clear. Pupils equal.  ENT: Moist oral mucosa.  Trachea midline, no adenopathy. Cardiovascular: Regular rhythm, normal S1, S2. No murmur. No edema in lower extremities  Respiratory: Not using accessory muscles. Good inspiratory effort. Clear to auscultation bilaterally, no wheeze or crackles. GI: Abdomen soft, no tenderness, not distended  Musculoskeletal: No cyanosis in digits, neck supple  Neurology: CN 2-12 grossly intact. No speech or motor deficits  Psych: Normal affect. Alert and oriented in time, place and person  Skin: Warm, dry, normal turgor    Labs and Tests:  CBC:   Recent Labs     06/12/19  0639   WBC 3.7*   HGB 13.0        BMP:  No results for input(s): NA, K, CL, CO2, BUN, CREATININE, GLUCOSE in the last 72 hours. Hepatic: Recent Labs     06/12/19  0639 06/13/19  0644 06/14/19  0614   AST 58* 78* 70*   ALT 77* 83* 82*   BILITOT 11.2* 13.2* 14.0*   ALKPHOS 190* 213* 209*       ASSESSMENT AND PLAN    Active Problems:    Hepatitis    Obstructive jaundice  Resolved Problems:    * No resolved hospital problems. *      1.  14mm mass in the head of the pancreas.     -EUS done on 6/12/2019. It showed atypical cells. -ERCP done on 6/13/2019. Sphincterotomy done and metal stent placed.     -Pathology from both the procedures is pending.  -Overall clinical picture is suspicious for pancreatic adenocarcinoma.  -Patient has been evaluated by general surgery for possibility of Whipple. -Patient wishes to discuss treatment options with her family     Would await final pathology report before making further recommendations. Will discuss case with the surgeon. OK for discharge from oncology perspective. She will need f/u with Dr. Isatu Ling in one week.      Marizol Jacobson, [place]  [facility]

## 2019-06-14 NOTE — PROGRESS NOTES
[facility] GI  Gastroenterology Progress Note    Bill Mcfarland is a 80 y.o. female patient. Active Problems:    Hepatitis    Obstructive jaundice  Resolved Problems:    * No resolved hospital problems. *      SUBJECTIVE:  Pt with pruritus. No N/V or abd pain. ROS:  No fever, chills  No chest pain, palpitations  No SOB, cough  Gastrointestinal ROS: no abdominal pain, N/V    Physical    VITALS:  /68   Pulse 90   Temp 97.7 °F (36.5 °C) (Oral)   Resp 16   Ht 5' 5.5\" (1.664 m)   Wt 145 lb (65.8 kg)   SpO2 98%   BMI 23.76 kg/m²   TEMPERATURE:  Current - Temp: 97.7 °F (36.5 °C); Max - Temp  Av.4 °F (36.3 °C)  Min: 97 °F (36.1 °C)  Max: 97.8 °F (36.6 °C)    NAD  Regular rate   Lungs CTA Bilaterally  Abdomen soft, ND, NT,  Bowel sounds normal.    Data    Data Review:    Recent Labs     19  0639   WBC 3.7*   HGB 13.0   HCT 38.7   MCV 89.6        No results for input(s): NA, K, CL, CO2, PHOS, BUN, CREATININE in the last 72 hours. Invalid input(s): CA  Recent Labs     19  0639 19  0644 19  0614   AST 58* 78* 70*   ALT 77* 83* 82*   BILIDIR 8.5* >10.0* >10.0*   BILITOT 11.2* 13.2* 14.0*   ALKPHOS 190* 213* 209*     No results for input(s): LIPASE, AMYLASE in the last 72 hours. No results for input(s): PROTIME, INR in the last 72 hours. No results for input(s): PTT in the last 72 hours. 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  Suggest ERCP for further  evaluation. ASSESSMENT :    Pancreatic mass with bstructive jaundice - s/p stent placement. Bili up a little today but stent is felt to be in good position. Pt with pruritus but no other symptoms. Path pending.      PLAN :  - general diet  - I called in questran until pruritus resolves.  Also rec benadryl qhs  - OK from gi standpoint for d/c  - continue cipro x 3 days  - pt will f/u with surgery and onc  - started on a PPI for PUD - hp negative    Charity Shelley MD  [address]

## 2019-06-14 NOTE — CARE COORDINATION
Chart reviewed for discharge planning. Barrier(s) to discharge- Continuing to be worked up. Possible pancreatic cancer  Tentative discharge plan- Home  Tentative discharge date- To be determined, once medically stable    *Case management will continue to follow progress and update discharge plan as needed.     Electronically signed by ISRRAEL Hernández on 6/14/2019 at 8:44 AM

## 2019-06-14 NOTE — PROGRESS NOTES
Discharge paperwork given and reviewed with patient. All questions answered. Prescriptions delivered to bedside. PIV removed without complications. Awaiting transportation for patient.

## 2019-06-14 NOTE — DISCHARGE SUMMARY
placed on PPI therapy. She did meet with oncology prior to her discharge home. She will take the next week to consider her options and come up with a plan     Consults. IP CONSULT TO HOSPITALIST  IP CONSULT TO GENERAL SURGERY  IP CONSULT TO GI  IP CONSULT TO ONCOLOGY    Physical examination on discharge day. /71   Pulse 89   Temp 97.8 °F (36.6 °C) (Oral)   Resp 18   Ht 5' 5.5\" (1.664 m)   Wt 145 lb (65.8 kg)   SpO2 95%   BMI 23.76 kg/m²   General appearance. Alert. Looks comfortable and younger than stated age. She is jaundiced   HEENT. Sclera clear. Moist mucus membranes. Cardiovascular. Regular rate and rhythm, normal S1, S2. No murmur. Respiratory. Not using accessory muscles. Clear to auscultation bilaterally, no wheeze. Gastrointestinal. Abdomen soft, non-tender, not distended, normal bowel sounds  Neurology. Facial symmetry. No speech deficits. Moving all extremities equally. Extremities. No edema in lower extremities. Skin. Warm, dry, normal turgor    Condition at time of discharge stable     Medication instructions provided to patient at discharge.      Medication List      START taking these medications    ciprofloxacin 500 MG tablet  Commonly known as:  CIPRO  Take 1 tablet by mouth 2 times daily for 3 days     pantoprazole 40 MG tablet  Commonly known as:  PROTONIX  Take 1 tablet by mouth 2 times daily (before meals)  Start taking on:  6/14/2019        CONTINUE taking these medications    CARTIA  MG extended release capsule  Generic drug:  diltiazem     CHELATED MAGNESIUM PO     Co Q-10 300 MG Caps     escitalopram 10 MG tablet  Commonly known as:  LEXAPRO     estrogens (conjugated) 0.625 MG tablet  Commonly known as:  PREMARIN     GLUCOSAMINE 1500 COMPLEX PO     LOVAZA 1 g capsule  Generic drug:  omega-3 acid ethyl esters     metoprolol succinate 25 MG extended release tablet  Commonly known as:  TOPROL XL     NONFORMULARY     paregoric 2 MG/5ML solution     SYNTHROID 50 MCG tablet  Generic drug:  levothyroxine     vitamin B-12 1000 MCG tablet  Commonly known as:  CYANOCOBALAMIN     Vitamin K2-Vitamin D3  MCG-UNIT Caps        STOP taking these medications    meloxicam 15 MG tablet  Commonly known as:  MOBIC        ASK your doctor about these medications    hydrochlorothiazide 25 MG tablet  Commonly known as:  HYDRODIURIL     oxyCODONE-acetaminophen 5-325 MG per tablet  Commonly known as:  PERCOCET     triamcinolone 0.1 % cream  Commonly known as:  KENALOG  Apply topically both ear canals with q tip every night. Where to Get Your Medications      You can get these medications from any pharmacy    Bring a paper prescription for each of these medications  · ciprofloxacin 500 MG tablet  · pantoprazole 40 MG tablet         Discharge recommendations given to patient. Follow Up. in 1 week   Disposition. Home   Activity. As tolerated   Diet: DIET CLEAR LIQUID;  DIET FULL LIQUID;      Spent > 30 minutes in discharge process.     Signed:  AME Mensah CNP     6/14/2019 10:03 AM

## 2019-06-14 NOTE — PLAN OF CARE
Problem: Falls - Risk of:  Goal: Absence of physical injury  Description  Absence of physical injury  Outcome: Met This Shift     Problem: Cardiac:  Goal: Ability to maintain vital signs within normal range will improve  Description  Ability to maintain vital signs within normal range will improve  Outcome: Met This Shift  Note:   Vitals:    06/14/19 0034   BP: 129/70   Pulse: 60   Resp: 16   Temp: 97.4 °F (36.3 °C)   SpO2: 94%         Problem: Falls - Risk of:   Intervention: Assess risk factors for falls  Note:   Pt is high fall risk  Intervention: Assess fall prevention measures  Note:   Bed alarm activated    Intervention: Manage a safe environment  Note:   Camera in room

## 2019-06-14 NOTE — PROGRESS NOTES
0930 Assessment complete and documented. Morning meds given per MAR. Patient up to chair. Washed up and makeup applied. Awaiting discharge. The care plan and education has been reviewed and mutually agreed upon with the patient. Pt demonstrated how to reach nurse with call light. Call light in reach. Will continue to monitor.

## 2019-06-14 NOTE — PROGRESS NOTES
meals) 6/14/19  Yes AME Lassiter CNP   ciprofloxacin (CIPRO) 500 MG tablet Take 1 tablet by mouth 2 times daily for 3 days 6/13/19 6/16/19 Yes AME Lassiter CNP   vitamin B-12 (CYANOCOBALAMIN) 1000 MCG tablet Take 1,000 mcg by mouth daily   Yes Historical Provider, MD   Vitamin D-Vitamin K (VITAMIN K2-VITAMIN D3)  MCG-UNIT CAPS Take 1 tablet by mouth daily   Yes Historical Provider, MD   CHELATED MAGNESIUM PO Take 2 tablets by mouth daily   Yes Historical Provider, MD   escitalopram (LEXAPRO) 10 MG tablet  1/3/17  Yes Historical Provider, MD   SYNTHROID 50 MCG tablet Take 100 mcg by mouth Daily  2/23/17  Yes Historical Provider, MD   diltiazem (CARTIA XT) 240 MG extended release capsule Take 240 mg by mouth daily   Yes Historical Provider, MD   omega-3 acid ethyl esters (LOVAZA) 1 G capsule Take 2 g by mouth daily. Yes Historical Provider, MD   estrogens, conjugated, (PREMARIN) 0.625 MG tablet Take 0.625 mg by mouth daily. Yes Historical Provider, MD   metoprolol (TOPROL-XL) 25 MG XL tablet Take 25 mg by mouth daily    Yes Historical Provider, MD   Coenzyme Q10 (CO Q-10) 300 MG CAPS Take 1 tablet by mouth Daily. Yes Historical Provider, MD   Glucosamine-Chondroit-Vit C-Mn (GLUCOSAMINE 1500 COMPLEX PO) Take 1,500 mg by mouth 3 times daily. Yes Historical Provider, MD   NONFORMULARY 2 capsules daily at 1800 cellgevity 2 caps daily, vision 2 tabs daily, move free triple strength 2 daily,bowel support formula 1 daily, omega support 2 daily. Foundation vitamin formula 1 daily. Yes Historical Provider, MD   hydrochlorothiazide (HYDRODIURIL) 25 MG tablet  2/20/17   Historical Provider, MD   oxyCODONE-acetaminophen (PERCOCET) 5-325 MG per tablet . 1/3/17   Historical Provider, MD   paregoric 2 MG/5ML solution Prn for diarrhea 1/3/17   Historical Provider, MD   triamcinolone (KENALOG) 0.1 % cream Apply topically both ear canals with q tip every night.  2/7/17   Michel Dillon CT on 06/09/2019    HISTORY:  Abnormal LFTs.  Biliary ductal dilatation. FINDINGS:  Gallbladder: Gallbladder is distended and contains mild sludge.  No  significant gallbladder wall thickening or pericholecystic fluid. Bile Ducts: Moderate intrahepatic and marked extrahepatic biliary ductal  dilatation with the common bile duct measuring 2 cm in caliber.  There is  abrupt termination of the distal common bile duct with a subtle ampullary  mass measuring 1.4 x 1.1 cm. Pancreatic Duct: No significant pancreatic ductal dilatation.  No pancreas  divisum. Other:  7 cm right renal cyst with tiny left renal cyst.  No hydronephrosis. No hepatic steatosis.  No adrenal nodule.  Moderate hiatal hernia.  Colonic  diverticulosis. 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  Suggest ERCP for further  evaluation. FL ERCP BILIARY AND PANCREATIC S&I [[id number]] Collected: 06/13/19 1351 Updated: 06/13/19 1355 Narrative:   EXAMINATION:  7 SPOT IMAGES FROM AN ERCP    COMPARISON:  None    FLUOROSCOPY DOSE OR TIME/IMAGES:  Left 16.0 minutes, 7 images    HISTORY:  ORDERING SYSTEM PROVIDED HISTORY: pain  TECHNOLOGIST PROVIDED HISTORY:  Reason for exam:->pain    FINDINGS:  Endoscopy and cannulation of the major papilla was performed by the  gastroenterology service under the supervision of [address] [place] [address]. Images  demonstrate access to the biliary system with injection of contrast and  subsequent placement of a stent within the lower common bile duct which  appears to extend into the duodenum. Impression:   Unremarkable ERCP images. Please refer to the procedure report for further  details.      ASSESSMENT AND PLAN:  Ampullary mass  Obstructive jaundice  Cholelithiasis    EUS, bx, ERCP, brushing and stent are done  Cipro X 3 d per GI, no clinical ongoing concern of bacteremia from CBD instrumentation - doing well  Diet

## 2019-08-02 ENCOUNTER — HOSPITAL ENCOUNTER (OUTPATIENT)
Age: 84
Discharge: HOME | End: 2019-08-02
Payer: MEDICARE

## 2019-08-02 LAB
A/G RATIO: 1.4 (ref 1.1–2.2)
ALBUMIN SERPL-MCNC: 4.3 G/DL (ref 3.4–5)
ALP BLD-CCNC: 99 U/L (ref 40–129)
ALT SERPL-CCNC: 23 U/L (ref 10–40)
ANION GAP SERPL CALCULATED.3IONS-SCNC: 15 MMOL/L (ref 3–16)
AST SERPL-CCNC: 26 U/L (ref 15–37)
BILIRUB SERPL-MCNC: 0.8 MG/DL (ref 0–1)
BUN BLDV-MCNC: 18 MG/DL (ref 7–20)
CALCIUM SERPL-MCNC: 9.7 MG/DL (ref 8.3–10.6)
CHLORIDE BLD-SCNC: 103 MMOL/L (ref 99–110)
CO2: 27 MMOL/L (ref 21–32)
CREAT SERPL-MCNC: 1 MG/DL (ref 0.6–1.2)
GFR AFRICAN AMERICAN: >60
GFR NON-AFRICAN AMERICAN: 52
GLOBULIN: 3.1 G/DL
GLUCOSE BLD-MCNC: 123 MG/DL (ref 70–99)
HCT VFR BLD CALC: 46.8 % (ref 36–48)
HEMOGLOBIN: 15.8 G/DL (ref 12–16)
MCH RBC QN AUTO: 31.8 PG (ref 26–34)
MCHC RBC AUTO-ENTMCNC: 33.7 G/DL (ref 31–36)
MCV RBC AUTO: 94.4 FL (ref 80–100)
PDW BLD-RTO: 14.5 % (ref 12.4–15.4)
PLATELET # BLD: 210 K/UL (ref 135–450)
PMV BLD AUTO: 10.5 FL (ref 5–10.5)
POTASSIUM SERPL-SCNC: 4.7 MMOL/L (ref 3.5–5.1)
RBC # BLD: 4.95 M/UL (ref 4–5.2)
SODIUM BLD-SCNC: 145 MMOL/L (ref 136–145)
TOTAL PROTEIN: 7.4 G/DL (ref 6.4–8.2)
WBC # BLD: 7.2 K/UL (ref 4–11)

## 2019-08-02 PROCEDURE — 80053 COMPREHEN METABOLIC PANEL: CPT

## 2019-08-02 PROCEDURE — 85027 COMPLETE CBC AUTOMATED: CPT

## 2019-08-02 PROCEDURE — 36415 COLL VENOUS BLD VENIPUNCTURE: CPT

## 2019-11-18 ENCOUNTER — HOSPITAL ENCOUNTER (OUTPATIENT)
Dept: CT IMAGING | Age: 84
Discharge: HOME | End: 2019-11-18
Payer: MEDICARE

## 2019-11-18 ENCOUNTER — HOSPITAL ENCOUNTER (OUTPATIENT)
Age: 84
Discharge: HOME | End: 2019-11-18
Payer: MEDICARE

## 2019-11-18 DIAGNOSIS — C25.9 MALIGNANT NEOPLASM OF PANCREAS, UNSPECIFIED LOCATION OF MALIGNANCY (HCC): ICD-10-CM

## 2019-11-18 LAB
BUN BLDV-MCNC: 18 MG/DL (ref 7–20)
CREAT SERPL-MCNC: 0.9 MG/DL (ref 0.6–1.2)
GFR AFRICAN AMERICAN: >60
GFR NON-AFRICAN AMERICAN: 59

## 2019-11-18 PROCEDURE — 82565 ASSAY OF CREATININE: CPT

## 2019-11-18 PROCEDURE — 84520 ASSAY OF UREA NITROGEN: CPT

## 2019-11-18 PROCEDURE — 36415 COLL VENOUS BLD VENIPUNCTURE: CPT

## 2019-11-18 PROCEDURE — 6360000004 HC RX CONTRAST MEDICATION: Performed by: FAMILY MEDICINE

## 2019-11-18 PROCEDURE — 74160 CT ABDOMEN W/CONTRAST: CPT

## 2019-11-18 RX ADMIN — IOPAMIDOL 75 ML: 755 INJECTION, SOLUTION INTRAVENOUS at 17:25

## 2019-11-18 RX ADMIN — IOHEXOL 50 ML: 240 INJECTION, SOLUTION INTRATHECAL; INTRAVASCULAR; INTRAVENOUS; ORAL at 17:26

## 2020-08-26 ENCOUNTER — HOSPITAL ENCOUNTER (OUTPATIENT)
Dept: CT IMAGING | Age: 85
Discharge: HOME | End: 2020-08-26
Payer: MEDICARE

## 2020-08-26 ENCOUNTER — HOSPITAL ENCOUNTER (OUTPATIENT)
Dept: GENERAL RADIOLOGY | Age: 85
Discharge: HOME | End: 2020-08-26
Payer: MEDICARE

## 2020-08-26 ENCOUNTER — HOSPITAL ENCOUNTER (OUTPATIENT)
Age: 85
Discharge: HOME | End: 2020-08-26
Payer: MEDICARE

## 2020-08-26 PROCEDURE — 73502 X-RAY EXAM HIP UNI 2-3 VIEWS: CPT

## 2020-08-26 PROCEDURE — 73590 X-RAY EXAM OF LOWER LEG: CPT

## 2020-08-26 PROCEDURE — 73552 X-RAY EXAM OF FEMUR 2/>: CPT

## 2020-08-26 PROCEDURE — 6360000004 HC RX CONTRAST MEDICATION: Performed by: INTERNAL MEDICINE

## 2020-08-26 PROCEDURE — 74177 CT ABD & PELVIS W/CONTRAST: CPT

## 2020-08-26 RX ADMIN — IOPAMIDOL 75 ML: 755 INJECTION, SOLUTION INTRAVENOUS at 17:11

## 2020-08-26 RX ADMIN — IOHEXOL 50 ML: 240 INJECTION, SOLUTION INTRATHECAL; INTRAVASCULAR; INTRAVENOUS; ORAL at 17:11

## 2020-09-02 ENCOUNTER — HOSPITAL ENCOUNTER (OUTPATIENT)
Dept: CT IMAGING | Age: 85
Discharge: HOME | End: 2020-09-02
Payer: MEDICARE

## 2020-09-02 PROCEDURE — 73700 CT LOWER EXTREMITY W/O DYE: CPT

## 2020-10-05 ENCOUNTER — HOSPITAL ENCOUNTER (OUTPATIENT)
Dept: MRI IMAGING | Age: 85
Discharge: HOME | End: 2020-10-05
Payer: MEDICARE

## 2020-10-05 PROCEDURE — 73721 MRI JNT OF LWR EXTRE W/O DYE: CPT

## 2020-10-16 ENCOUNTER — HOSPITAL ENCOUNTER (INPATIENT)
Age: 85
LOS: 11 days | Discharge: NURSING, REHAB OR LONG-TERM CARE | DRG: 919 | End: 2020-10-27
Attending: STUDENT IN AN ORGANIZED HEALTH CARE EDUCATION/TRAINING PROGRAM | Admitting: INTERNAL MEDICINE
Payer: MEDICARE

## 2020-10-16 ENCOUNTER — APPOINTMENT (OUTPATIENT)
Dept: CT IMAGING | Age: 85
DRG: 919 | End: 2020-10-16
Payer: MEDICARE

## 2020-10-16 PROBLEM — C25.9 PANCREATIC CANCER (HCC): Status: ACTIVE | Noted: 2020-10-16

## 2020-10-16 LAB
A/G RATIO: 1.1 (ref 1.1–2.2)
ALBUMIN SERPL-MCNC: 3.3 G/DL (ref 3.4–5)
ALP BLD-CCNC: 439 U/L (ref 40–129)
ALT SERPL-CCNC: 69 U/L (ref 10–40)
ANION GAP SERPL CALCULATED.3IONS-SCNC: 13 MMOL/L (ref 3–16)
AST SERPL-CCNC: 82 U/L (ref 15–37)
BASOPHILS ABSOLUTE: 0 K/UL (ref 0–0.2)
BASOPHILS RELATIVE PERCENT: 0.2 %
BILIRUB SERPL-MCNC: 3 MG/DL (ref 0–1)
BUN BLDV-MCNC: 17 MG/DL (ref 7–20)
CALCIUM SERPL-MCNC: 8.9 MG/DL (ref 8.3–10.6)
CHLORIDE BLD-SCNC: 100 MMOL/L (ref 99–110)
CO2: 24 MMOL/L (ref 21–32)
CREAT SERPL-MCNC: 0.9 MG/DL (ref 0.6–1.2)
EOSINOPHILS ABSOLUTE: 0 K/UL (ref 0–0.6)
EOSINOPHILS RELATIVE PERCENT: 0 %
GFR AFRICAN AMERICAN: >60
GFR NON-AFRICAN AMERICAN: 59
GLOBULIN: 3.1 G/DL
GLUCOSE BLD-MCNC: 127 MG/DL (ref 70–99)
HCT VFR BLD CALC: 40.9 % (ref 36–48)
HEMOGLOBIN: 13.5 G/DL (ref 12–16)
LACTIC ACID, SEPSIS: 1.9 MMOL/L (ref 0.4–1.9)
LIPASE: 259 U/L (ref 13–60)
LYMPHOCYTES ABSOLUTE: 0.2 K/UL (ref 1–5.1)
LYMPHOCYTES RELATIVE PERCENT: 1.6 %
MCH RBC QN AUTO: 30.7 PG (ref 26–34)
MCHC RBC AUTO-ENTMCNC: 33 G/DL (ref 31–36)
MCV RBC AUTO: 93.1 FL (ref 80–100)
MONOCYTES ABSOLUTE: 0.2 K/UL (ref 0–1.3)
MONOCYTES RELATIVE PERCENT: 1.9 %
NEUTROPHILS ABSOLUTE: 11.2 K/UL (ref 1.7–7.7)
NEUTROPHILS RELATIVE PERCENT: 96.3 %
PDW BLD-RTO: 12.7 % (ref 12.4–15.4)
PLATELET # BLD: 229 K/UL (ref 135–450)
PMV BLD AUTO: 9.4 FL (ref 5–10.5)
POTASSIUM REFLEX MAGNESIUM: 3.7 MMOL/L (ref 3.5–5.1)
RBC # BLD: 4.39 M/UL (ref 4–5.2)
SODIUM BLD-SCNC: 137 MMOL/L (ref 136–145)
TOTAL PROTEIN: 6.4 G/DL (ref 6.4–8.2)
WBC # BLD: 11.6 K/UL (ref 4–11)

## 2020-10-16 PROCEDURE — 80053 COMPREHEN METABOLIC PANEL: CPT

## 2020-10-16 PROCEDURE — 96375 TX/PRO/DX INJ NEW DRUG ADDON: CPT

## 2020-10-16 PROCEDURE — 6360000004 HC RX CONTRAST MEDICATION: Performed by: PHYSICIAN ASSISTANT

## 2020-10-16 PROCEDURE — 83690 ASSAY OF LIPASE: CPT

## 2020-10-16 PROCEDURE — 87040 BLOOD CULTURE FOR BACTERIA: CPT

## 2020-10-16 PROCEDURE — 6360000002 HC RX W HCPCS: Performed by: PHYSICIAN ASSISTANT

## 2020-10-16 PROCEDURE — 74177 CT ABD & PELVIS W/CONTRAST: CPT

## 2020-10-16 PROCEDURE — 6360000002 HC RX W HCPCS: Performed by: INTERNAL MEDICINE

## 2020-10-16 PROCEDURE — 2580000003 HC RX 258: Performed by: STUDENT IN AN ORGANIZED HEALTH CARE EDUCATION/TRAINING PROGRAM

## 2020-10-16 PROCEDURE — 96365 THER/PROPH/DIAG IV INF INIT: CPT

## 2020-10-16 PROCEDURE — 2580000003 HC RX 258: Performed by: INTERNAL MEDICINE

## 2020-10-16 PROCEDURE — 36415 COLL VENOUS BLD VENIPUNCTURE: CPT

## 2020-10-16 PROCEDURE — 2580000003 HC RX 258: Performed by: PHYSICIAN ASSISTANT

## 2020-10-16 PROCEDURE — 1200000000 HC SEMI PRIVATE

## 2020-10-16 PROCEDURE — 83605 ASSAY OF LACTIC ACID: CPT

## 2020-10-16 PROCEDURE — 99285 EMERGENCY DEPT VISIT HI MDM: CPT

## 2020-10-16 PROCEDURE — 85025 COMPLETE CBC W/AUTO DIFF WBC: CPT

## 2020-10-16 PROCEDURE — 87186 SC STD MICRODIL/AGAR DIL: CPT

## 2020-10-16 PROCEDURE — 87150 DNA/RNA AMPLIFIED PROBE: CPT

## 2020-10-16 PROCEDURE — 6370000000 HC RX 637 (ALT 250 FOR IP): Performed by: INTERNAL MEDICINE

## 2020-10-16 RX ORDER — MAGNESIUM SULFATE IN WATER 40 MG/ML
2 INJECTION, SOLUTION INTRAVENOUS PRN
Status: DISCONTINUED | OUTPATIENT
Start: 2020-10-16 | End: 2020-10-27 | Stop reason: HOSPADM

## 2020-10-16 RX ORDER — 0.9 % SODIUM CHLORIDE 0.9 %
1000 INTRAVENOUS SOLUTION INTRAVENOUS ONCE
Status: COMPLETED | OUTPATIENT
Start: 2020-10-16 | End: 2020-10-16

## 2020-10-16 RX ORDER — SODIUM CHLORIDE 0.9 % (FLUSH) 0.9 %
10 SYRINGE (ML) INJECTION PRN
Status: DISCONTINUED | OUTPATIENT
Start: 2020-10-16 | End: 2020-10-18 | Stop reason: SDUPTHER

## 2020-10-16 RX ORDER — LEVOTHYROXINE SODIUM 0.1 MG/1
100 TABLET ORAL DAILY
Status: DISCONTINUED | OUTPATIENT
Start: 2020-10-17 | End: 2020-10-27 | Stop reason: HOSPADM

## 2020-10-16 RX ORDER — ACETAMINOPHEN 325 MG/1
650 TABLET ORAL EVERY 6 HOURS PRN
Status: DISCONTINUED | OUTPATIENT
Start: 2020-10-16 | End: 2020-10-27 | Stop reason: HOSPADM

## 2020-10-16 RX ORDER — PROMETHAZINE HYDROCHLORIDE 25 MG/1
12.5 TABLET ORAL EVERY 6 HOURS PRN
Status: DISCONTINUED | OUTPATIENT
Start: 2020-10-16 | End: 2020-10-27 | Stop reason: HOSPADM

## 2020-10-16 RX ORDER — ACETAMINOPHEN 650 MG/1
650 SUPPOSITORY RECTAL EVERY 6 HOURS PRN
Status: DISCONTINUED | OUTPATIENT
Start: 2020-10-16 | End: 2020-10-27 | Stop reason: HOSPADM

## 2020-10-16 RX ORDER — SODIUM CHLORIDE 0.9 % (FLUSH) 0.9 %
10 SYRINGE (ML) INJECTION EVERY 12 HOURS SCHEDULED
Status: DISCONTINUED | OUTPATIENT
Start: 2020-10-16 | End: 2020-10-18 | Stop reason: SDUPTHER

## 2020-10-16 RX ORDER — POLYETHYLENE GLYCOL 3350 17 G/17G
17 POWDER, FOR SOLUTION ORAL DAILY PRN
Status: DISCONTINUED | OUTPATIENT
Start: 2020-10-16 | End: 2020-10-27 | Stop reason: HOSPADM

## 2020-10-16 RX ORDER — ONDANSETRON 2 MG/ML
4 INJECTION INTRAMUSCULAR; INTRAVENOUS EVERY 6 HOURS PRN
Status: DISCONTINUED | OUTPATIENT
Start: 2020-10-16 | End: 2020-10-27 | Stop reason: HOSPADM

## 2020-10-16 RX ORDER — POTASSIUM CHLORIDE 20 MEQ/1
40 TABLET, EXTENDED RELEASE ORAL PRN
Status: DISCONTINUED | OUTPATIENT
Start: 2020-10-16 | End: 2020-10-27 | Stop reason: HOSPADM

## 2020-10-16 RX ORDER — SODIUM CHLORIDE 9 MG/ML
INJECTION, SOLUTION INTRAVENOUS CONTINUOUS
Status: DISCONTINUED | OUTPATIENT
Start: 2020-10-16 | End: 2020-10-22

## 2020-10-16 RX ORDER — ESOMEPRAZOLE MAGNESIUM 20 MG/1
20 FOR SUSPENSION ORAL DAILY
COMMUNITY

## 2020-10-16 RX ORDER — POTASSIUM CHLORIDE 7.45 MG/ML
10 INJECTION INTRAVENOUS PRN
Status: DISCONTINUED | OUTPATIENT
Start: 2020-10-16 | End: 2020-10-27 | Stop reason: HOSPADM

## 2020-10-16 RX ORDER — ONDANSETRON 2 MG/ML
4 INJECTION INTRAMUSCULAR; INTRAVENOUS EVERY 30 MIN PRN
Status: DISCONTINUED | OUTPATIENT
Start: 2020-10-16 | End: 2020-10-16

## 2020-10-16 RX ORDER — PANTOPRAZOLE SODIUM 40 MG/10ML
40 INJECTION, POWDER, LYOPHILIZED, FOR SOLUTION INTRAVENOUS DAILY
Status: DISCONTINUED | OUTPATIENT
Start: 2020-10-17 | End: 2020-10-27 | Stop reason: HOSPADM

## 2020-10-16 RX ADMIN — SODIUM CHLORIDE 1000 ML: 9 INJECTION, SOLUTION INTRAVENOUS at 12:56

## 2020-10-16 RX ADMIN — PIPERACILLIN AND TAZOBACTAM 3.38 G: 3; .375 INJECTION, POWDER, LYOPHILIZED, FOR SOLUTION INTRAVENOUS at 20:58

## 2020-10-16 RX ADMIN — ACETAMINOPHEN 650 MG: 325 TABLET ORAL at 20:59

## 2020-10-16 RX ADMIN — ENOXAPARIN SODIUM 40 MG: 40 INJECTION SUBCUTANEOUS at 18:54

## 2020-10-16 RX ADMIN — Medication 10 ML: at 20:59

## 2020-10-16 RX ADMIN — SODIUM CHLORIDE: 9 INJECTION, SOLUTION INTRAVENOUS at 20:58

## 2020-10-16 RX ADMIN — ONDANSETRON 4 MG: 2 INJECTION INTRAMUSCULAR; INTRAVENOUS at 21:23

## 2020-10-16 RX ADMIN — PIPERACILLIN AND TAZOBACTAM 3.38 G: 3; .375 INJECTION, POWDER, LYOPHILIZED, FOR SOLUTION INTRAVENOUS at 14:38

## 2020-10-16 RX ADMIN — Medication 10 ML: at 21:22

## 2020-10-16 RX ADMIN — SODIUM CHLORIDE 1000 ML: 9 INJECTION, SOLUTION INTRAVENOUS at 16:04

## 2020-10-16 RX ADMIN — IOPAMIDOL 75 ML: 755 INJECTION, SOLUTION INTRAVENOUS at 13:36

## 2020-10-16 RX ADMIN — ONDANSETRON 4 MG: 2 INJECTION INTRAMUSCULAR; INTRAVENOUS at 12:56

## 2020-10-16 ASSESSMENT — PAIN SCALES - GENERAL
PAINLEVEL_OUTOF10: 0

## 2020-10-16 ASSESSMENT — ENCOUNTER SYMPTOMS
NAUSEA: 1
VOMITING: 1
DIARRHEA: 0
SHORTNESS OF BREATH: 0
BACK PAIN: 0
ABDOMINAL PAIN: 1
RHINORRHEA: 0
SORE THROAT: 0
CONSTIPATION: 0
COUGH: 0
EYE PAIN: 0

## 2020-10-16 NOTE — ED NOTES
Talked with Dr. Tuan Gonzlaes with GI, verified that NG tube does not need placed unless patient begins to vomit.  Pt currently has not had emesis since Sparkle Brown, FRANCOISE  16/67/82 [id number]

## 2020-10-16 NOTE — ED NOTES
Pharmacy Medication History Note      List of current medications patient is taking is complete. Source of information: Patient, [facility]    Changes made to medication list:  Medications flagged for removal (include reason, ex. noncompliance):  None    Medications removed (include reason, ex. therapy complete or physician discontinued):  Pantoprazole- therapy complete  Vitamin B12- therapy complete  Vitamin D/K- therapy complete  Chelated Magnesium- therapy complete  Hydrochlorothiazide- therapy complete  Oxycodone/Acetaminophen- therapy complete  Paregoric - therapy complete  Omega 3 Ethyl Esters- therapy complete  Coenzyme Q10- therapy completed  Glucosamide- chondroitin - therapy complete  Cellgevity- therapy complete    Medications added/doses adjusted:  Nexium 20 mg daily    Other notes (ex. Recent course of antibiotics, Coumadin dosing):  Denies use of other OTC or herbal medications. Last dose times updated. Lord Giron, AngieD  PGY1 Pharmacy Resident  [id number]     Home Medicine Reconciliation:    No current facility-administered medications on file prior to encounter. Current Outpatient Medications on File Prior to Encounter   Medication Sig Dispense Refill    esomeprazole Magnesium (NEXIUM) 20 MG PACK Take 20 mg by mouth daily      escitalopram (LEXAPRO) 10 MG tablet Take 10 mg by mouth nightly       SYNTHROID 100 MCG tablet Take 100 mcg by mouth Daily       diltiazem (CARTIA XT) 240 MG extended release capsule Take 240 mg by mouth daily      triamcinolone (KENALOG) 0.1 % cream Apply topically both ear canals with q tip every night. 15 g 1    estrogens, conjugated, (PREMARIN) 0.625 MG tablet Take 0.625 mg by mouth daily.       metoprolol (TOPROL-XL) 25 MG XL tablet Take 25 mg by mouth every evening       [DISCONTINUED] pantoprazole (PROTONIX) 40 MG tablet Take 1 tablet by mouth 2 times daily (before meals) 30 tablet 3    [DISCONTINUED] vitamin B-12 (CYANOCOBALAMIN) 1000 MCG tablet Take 1,000 mcg by mouth daily      [DISCONTINUED] Vitamin D-Vitamin K (VITAMIN K2-VITAMIN D3)  MCG-UNIT CAPS Take 1 tablet by mouth daily      [DISCONTINUED] CHELATED MAGNESIUM PO Take 2 tablets by mouth daily      [DISCONTINUED] hydrochlorothiazide (HYDRODIURIL) 25 MG tablet       [DISCONTINUED] oxyCODONE-acetaminophen (PERCOCET) 5-325 MG per tablet . [DISCONTINUED] paregoric 2 MG/5ML solution Prn for diarrhea      [DISCONTINUED] omega-3 acid ethyl esters (LOVAZA) 1 G capsule Take 2 g by mouth daily. [DISCONTINUED] Coenzyme Q10 (CO Q-10) 300 MG CAPS Take 1 tablet by mouth Daily. [DISCONTINUED] Glucosamine-Chondroit-Vit C-Mn (GLUCOSAMINE 1500 COMPLEX PO) Take 1,500 mg by mouth 3 times daily. [DISCONTINUED] NONFORMULARY 2 capsules daily at 1800 cellgevity 2 caps daily, vision 2 tabs daily, move free triple strength 2 daily,bowel support formula 1 daily, omega support 2 daily. Foundation vitamin formula 1 daily.

## 2020-10-16 NOTE — H&P
HOSPITALISTS HISTORY AND PHYSICAL    10/16/2020 4:49 PM    Patient Information:  Daniella Eason is a 80 y.o. female [id number]  PCP:  Fabiola Harper MD (Tel: [phone] )    Chief complaint:    Chief Complaint   Patient presents with    Abdominal Pain     Pt from home, via [facility], states she took prilosec at 6AM and now her abdomen hurts. States had N/V X4 but nothing since 10 AM         History of Present Illness:  Amandeep Kwong is a 80 y.o. female has medical history significant for pancreatic cancer, hypothyroidism and hypertension presented with abdominal pain. History was obtained from nurse and per chart review as patient stated she was tired and already told the story multiple times. Patient had sudden onset of severe epigastric and RUQ pain associated with nausea and vomiting this morning. She did endorsed indigestion from last couple of days and has been taking PPI and eating very little but denied any fever, chills, hematemesis, hemoptysis or diarrhea    Of note, patient  was diagnosed with pancreatic cancer per EUS during her admission for obstructive jaundice on 6/2019; underwent ERCP with fully covered expandable metal mesh biliary stent placement. Patient was scheduled to undergo Whipple procedure but there is no follow-up and patient stated that the physicians told me that I had 3-month her life but my Jenny Jenera has healed me. REVIEW OF SYSTEMS:   Detailed 12 point ROS could not be obtained as patient stated that she was tired and sleepy and already answering the question to other physicians. Past Medical History:   has a past medical history of Arthritis, Hypertension, Rectocele, and SVT (supraventricular tachycardia) ([facility] [address] Past Surgical History:   has a past surgical history that includes Lumbar disc surgery (2008);  Hysterectomy; fracture surgery (4-4-2010); Rectocele repair (4/29/13); Upper gastrointestinal endoscopy (N/A, 6/12/2019); Upper gastrointestinal endoscopy (N/A, 6/12/2019); ERCP (N/A, 6/13/2019); ERCP (N/A, 6/13/2019); and ERCP (N/A, 6/13/2019). Medications:  No current facility-administered medications on file prior to encounter. Current Outpatient Medications on File Prior to Encounter   Medication Sig Dispense Refill    esomeprazole Magnesium (NEXIUM) 20 MG PACK Take 20 mg by mouth daily      escitalopram (LEXAPRO) 10 MG tablet Take 10 mg by mouth nightly       SYNTHROID 100 MCG tablet Take 100 mcg by mouth Daily       diltiazem (CARTIA XT) 240 MG extended release capsule Take 240 mg by mouth daily      triamcinolone (KENALOG) 0.1 % cream Apply topically both ear canals with q tip every night. 15 g 1    estrogens, conjugated, (PREMARIN) 0.625 MG tablet Take 0.625 mg by mouth daily.  metoprolol (TOPROL-XL) 25 MG XL tablet Take 25 mg by mouth every evening          Allergies: Allergies   Allergen Reactions    Sulfa Antibiotics Nausea And Vomiting        Social History:  Patient Lives    reports that she has never smoked. She has never used smokeless tobacco. She reports that she does not drink alcohol or use drugs. Family History:  family history is not on file. Physical Exam:  BP (!) 94/35   Pulse 81   Temp 99.9 °F (37.7 °C) (Oral)   Resp 27   Ht 5' 6\" (1.676 m)   Wt 146 lb (66.2 kg)   SpO2 93%   BMI 23.57 kg/m²     General appearance: Ill-looking and fatigued well   Cardiovascular: Regular rhythm, normal S1, S2. No murmur,No edema in BLE  Respiratory: Clear to auscultation bilaterally, no wheeze, good inspiratory effort  Gastrointestinal: Abdomen tender, not distended, normal bowel sounds  Musculoskeletal: Could not be done due to patient factors  Neurology: Cranial nerves grossly intact.   Detailed examination could not be obtained as patient was noncooperative  Labs:    CBC:   Lab Results   Component Value Date    WBC 11.6 10/16/2020    RBC 4.39 10/16/2020    HGB 13.5 10/16/2020    HCT 40.9 10/16/2020    MCV 93.1 10/16/2020    MCH 30.7 10/16/2020    MCHC 33.0 10/16/2020    RDW 12.7 10/16/2020     10/16/2020    MPV 9.4 10/16/2020     BMP:    Lab Results   Component Value Date     10/16/2020    K 3.7 10/16/2020     10/16/2020    CO2 24 10/16/2020    BUN 17 10/16/2020    CREATININE 0.9 10/16/2020    CALCIUM 8.9 10/16/2020    GFRAA >60 10/16/2020    GFRAA >60 04/30/2013    LABGLOM 59 10/16/2020    GLUCOSE 127 10/16/2020     CT ABDOMEN PELVIS W IV CONTRAST Additional Contrast? None   Final Result   Interval development of moderate intra and extrahepatic biliary dilation,   increased pneumobilia and pancreatic ductal dilation. This likely represents   malfunction of the distal common bile duct stent. Recommend GI consultation. Gastric and hiatal hernia distention with fluid. There is transition near   the duodenal junction with poor definition of the duodenum. This could be   reactive with resultant relative gastric outlet obstruction. Cholelithiasis with mild gallbladder distention with fluid, air and mild   surrounding pericholecystic edema possible cholecystitis. Gross deformity with severe canal stenosis L2-3 with prior compression   fractures and vertebral augmentation. This is unchanged. Chest Xray:   EKG:    I visualized CXR images and EKG strip    Discussed case with the ED physician and RN    Problem List  Active Problems:    * No active hospital problems. *  Resolved Problems:    * No resolved hospital problems.  *        Assessment/Plan:   History of pancreatic cancer: s/p ERCP with stent on 6/19  presented with nausea, vomiting and RUQ  CT abdomen showed moderate intra-and extrahepatic biliary dilatation, increase pneumobilia and pancreatic duct dilatation, gastric and hiatal hernia distention with fluid, cholelithiasis with mild gallbladder distention with fluid, air and mild surrounding pericholecystic edema consistent with cholecystitis  GI and surgery on board; appreciate their recs  Oncology consulted  Keep the patient NPO  NG tube decompression  IV antibiotics and maintenance fluids    Hypertension: Holding home blood pressure medication due 2/2 soft blood pressure  Monitor blood pressure for now    Hypothyroidism: Continue Synthroid    DVT prophylaxis: Lovenox  Code status: Full  Diet: NPO       Admit as inpatient. I anticipate hospitalization spanning more than two midnights for investigation and treatment of the above medically necessary diagnoses. Please note that some part of this chart was generated using Dragon dictation software. Although every effort was made to ensure the accuracy of this automated transcription, some errors in transcription may have occurred inadvertently. If you may need any clarification, please do not hesitate to contact me through [facility].        Tali Abraham MD    10/16/2020 4:49 PM
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
## 2020-10-16 NOTE — ED NOTES
Pt from home, states her son in law is Dr. Pal Griffin and he called [phone] for her because of abdominal pain. Pt states she took time release capsule of prilosec and she ate afterwards and now she just needs food and water to make herself feel better.       Carl Siu RN  28/88/73 [id number]

## 2020-10-16 NOTE — ED NOTES
Bedside report to [facility], nurses resuming care of patient. No questions or concerns at this time. Taken to room via wheelchair with belongings in toe. Pt left hearing aides at home.       Keo Iverson RN  90/84/35 [phone]

## 2020-10-16 NOTE — ED PROVIDER NOTES
acutely elevated  +hyperbilirubinemia and transamnitis when compared to last  Mild leukocytosis with left shift  CT ABDOMEN PELVIS W IV CONTRAST Additional Contrast? None   Final Result   Interval development of moderate intra and extrahepatic biliary dilation,   increased pneumobilia and pancreatic ductal dilation. This likely represents   malfunction of the distal common bile duct stent. Recommend GI consultation. Gastric and hiatal hernia distention with fluid. There is transition near   the duodenal junction with poor definition of the duodenum. This could be   reactive with resultant relative gastric outlet obstruction. Cholelithiasis with mild gallbladder distention with fluid, air and mild   surrounding pericholecystic edema possible cholecystitis. Gross deformity with severe canal stenosis L2-3 with prior compression   fractures and vertebral augmentation. This is unchanged. Medications   ondansetron (ZOFRAN) injection 4 mg (4 mg Intravenous Given 10/16/20 1256)   sodium chloride flush 0.9 % injection 10 mL (has no administration in time range)   sodium chloride flush 0.9 % injection 10 mL (has no administration in time range)   0.9 % sodium chloride IV bolus 1,000 mL (1,000 mLs Intravenous New Bag 10/16/20 1604)   piperacillin-tazobactam (ZOSYN) 3.375 g in dextrose 5 % 50 mL IVPB (mini-bag) (has no administration in time range)   0.9 % sodium chloride bolus (0 mLs Intravenous Stopped 10/16/20 1416)   iopamidol (ISOVUE-370) 76 % injection 75 mL (75 mLs Intravenous Given 10/16/20 1336)   piperacillin-tazobactam (ZOSYN) 3.375 g in dextrose 5 % 50 mL IVPB (mini-bag) (0 g Intravenous Stopped 10/16/20 1517)       Findings concerning for biliary stent malfunction and pneumobilia, possibly cholecystitis. IV zosyn was started. I discussed with GI and with [facility] Dr. Allison Hutchinson who will follow. Her PCP and son in law was updated by phone.         Patient Referrals:  No follow-up provider specified. Discharge Medications:  New Prescriptions    No medications on file       FINAL IMPRESSION  1. Biliary stent obstruction, initial encounter    2. Malignant neoplasm of pancreas, unspecified location of malignancy ([facility] [address]    3. Pneumobilia        Blood pressure (!) 94/35, pulse 81, temperature 99.9 °F (37.7 °C), temperature source Oral, resp. rate 27, height 5' 6\" (1.676 m), weight 146 lb (66.2 kg), SpO2 93 %.      For further details of [facility] emergency department encounter, please see documentation by advanced practice provider       Franco Hayden MD  10/16/20 6520

## 2020-10-16 NOTE — CARE COORDINATION
Attempted to meet with pt to complete initial assessment. Pt was using BSC and then was transferred to the floor. SW to follow.     Electronically signed by ADONAY Conley LSW on 10/16/2020 at 6:13 PM

## 2020-10-17 LAB
A/G RATIO: 0.9 (ref 1.1–2.2)
ALBUMIN SERPL-MCNC: 3 G/DL (ref 3.4–5)
ALP BLD-CCNC: 370 U/L (ref 40–129)
ALT SERPL-CCNC: 91 U/L (ref 10–40)
ANION GAP SERPL CALCULATED.3IONS-SCNC: 12 MMOL/L (ref 3–16)
APTT: 31.9 SEC (ref 24.2–36.2)
AST SERPL-CCNC: 91 U/L (ref 15–37)
BASOPHILS ABSOLUTE: 0 K/UL (ref 0–0.2)
BASOPHILS RELATIVE PERCENT: 0 %
BILIRUB SERPL-MCNC: 4 MG/DL (ref 0–1)
BILIRUBIN DIRECT: 3.6 MG/DL (ref 0–0.3)
BILIRUBIN, INDIRECT: 0.4 MG/DL (ref 0–1)
BUN BLDV-MCNC: 20 MG/DL (ref 7–20)
CALCIUM SERPL-MCNC: 8.2 MG/DL (ref 8.3–10.6)
CHLORIDE BLD-SCNC: 103 MMOL/L (ref 99–110)
CO2: 23 MMOL/L (ref 21–32)
CREAT SERPL-MCNC: 0.9 MG/DL (ref 0.6–1.2)
EOSINOPHILS ABSOLUTE: 0 K/UL (ref 0–0.6)
EOSINOPHILS RELATIVE PERCENT: 0 %
GFR AFRICAN AMERICAN: >60
GFR NON-AFRICAN AMERICAN: 59
GLOBULIN: 3.2 G/DL
GLUCOSE BLD-MCNC: 103 MG/DL (ref 70–99)
HCT VFR BLD CALC: 40 % (ref 36–48)
HEMOGLOBIN: 13.1 G/DL (ref 12–16)
INR BLD: 1.35 (ref 0.86–1.14)
LACTIC ACID: 2.5 MMOL/L (ref 0.4–2)
LIPASE: 47 U/L (ref 13–60)
LYMPHOCYTES ABSOLUTE: 0.2 K/UL (ref 1–5.1)
LYMPHOCYTES RELATIVE PERCENT: 3.8 %
MAGNESIUM: 1.9 MG/DL (ref 1.8–2.4)
MCH RBC QN AUTO: 30.9 PG (ref 26–34)
MCHC RBC AUTO-ENTMCNC: 32.8 G/DL (ref 31–36)
MCV RBC AUTO: 94.1 FL (ref 80–100)
MONOCYTES ABSOLUTE: 0 K/UL (ref 0–1.3)
MONOCYTES RELATIVE PERCENT: 0.7 %
NEUTROPHILS ABSOLUTE: 4.4 K/UL (ref 1.7–7.7)
NEUTROPHILS RELATIVE PERCENT: 95.5 %
PDW BLD-RTO: 12.8 % (ref 12.4–15.4)
PHOSPHORUS: 2.4 MG/DL (ref 2.5–4.9)
PLATELET # BLD: 200 K/UL (ref 135–450)
PMV BLD AUTO: 9.5 FL (ref 5–10.5)
POTASSIUM SERPL-SCNC: 3.9 MMOL/L (ref 3.5–5.1)
PREALBUMIN: 8.9 MG/DL (ref 20–40)
PROTHROMBIN TIME: 15.7 SEC (ref 10–13.2)
RBC # BLD: 4.25 M/UL (ref 4–5.2)
REPORT: NORMAL
SODIUM BLD-SCNC: 138 MMOL/L (ref 136–145)
TOTAL PROTEIN: 6.2 G/DL (ref 6.4–8.2)
WBC # BLD: 4.6 K/UL (ref 4–11)

## 2020-10-17 PROCEDURE — 84100 ASSAY OF PHOSPHORUS: CPT

## 2020-10-17 PROCEDURE — 6360000002 HC RX W HCPCS: Performed by: INTERNAL MEDICINE

## 2020-10-17 PROCEDURE — 83690 ASSAY OF LIPASE: CPT

## 2020-10-17 PROCEDURE — 84466 ASSAY OF TRANSFERRIN: CPT

## 2020-10-17 PROCEDURE — 6370000000 HC RX 637 (ALT 250 FOR IP): Performed by: INTERNAL MEDICINE

## 2020-10-17 PROCEDURE — 83735 ASSAY OF MAGNESIUM: CPT

## 2020-10-17 PROCEDURE — 84134 ASSAY OF PREALBUMIN: CPT

## 2020-10-17 PROCEDURE — APPNB30 APP NON BILLABLE TIME 0-30 MINS: Performed by: NURSE PRACTITIONER

## 2020-10-17 PROCEDURE — 51798 US URINE CAPACITY MEASURE: CPT

## 2020-10-17 PROCEDURE — APPSS60 APP SPLIT SHARED TIME 46-60 MINUTES: Performed by: NURSE PRACTITIONER

## 2020-10-17 PROCEDURE — 1200000000 HC SEMI PRIVATE

## 2020-10-17 PROCEDURE — 80053 COMPREHEN METABOLIC PANEL: CPT

## 2020-10-17 PROCEDURE — C9113 INJ PANTOPRAZOLE SODIUM, VIA: HCPCS | Performed by: INTERNAL MEDICINE

## 2020-10-17 PROCEDURE — 85610 PROTHROMBIN TIME: CPT

## 2020-10-17 PROCEDURE — 85730 THROMBOPLASTIN TIME PARTIAL: CPT

## 2020-10-17 PROCEDURE — 2580000003 HC RX 258: Performed by: INTERNAL MEDICINE

## 2020-10-17 PROCEDURE — 99222 1ST HOSP IP/OBS MODERATE 55: CPT | Performed by: SURGERY

## 2020-10-17 PROCEDURE — 82248 BILIRUBIN DIRECT: CPT

## 2020-10-17 PROCEDURE — 83605 ASSAY OF LACTIC ACID: CPT

## 2020-10-17 PROCEDURE — 85025 COMPLETE CBC W/AUTO DIFF WBC: CPT

## 2020-10-17 PROCEDURE — 94761 N-INVAS EAR/PLS OXIMETRY MLT: CPT

## 2020-10-17 RX ADMIN — ONDANSETRON 4 MG: 2 INJECTION INTRAMUSCULAR; INTRAVENOUS at 18:54

## 2020-10-17 RX ADMIN — ENOXAPARIN SODIUM 40 MG: 40 INJECTION SUBCUTANEOUS at 13:18

## 2020-10-17 RX ADMIN — Medication 10 ML: at 08:43

## 2020-10-17 RX ADMIN — LEVOTHYROXINE SODIUM 100 MCG: 0.1 TABLET ORAL at 05:13

## 2020-10-17 RX ADMIN — PIPERACILLIN AND TAZOBACTAM 3.38 G: 3; .375 INJECTION, POWDER, LYOPHILIZED, FOR SOLUTION INTRAVENOUS at 04:40

## 2020-10-17 RX ADMIN — PIPERACILLIN AND TAZOBACTAM 3.38 G: 3; .375 INJECTION, POWDER, LYOPHILIZED, FOR SOLUTION INTRAVENOUS at 20:59

## 2020-10-17 RX ADMIN — SODIUM CHLORIDE: 9 INJECTION, SOLUTION INTRAVENOUS at 13:26

## 2020-10-17 RX ADMIN — ACETAMINOPHEN 650 MG: 325 TABLET ORAL at 05:13

## 2020-10-17 RX ADMIN — PANTOPRAZOLE SODIUM 40 MG: 40 INJECTION, POWDER, FOR SOLUTION INTRAVENOUS at 08:44

## 2020-10-17 RX ADMIN — PIPERACILLIN AND TAZOBACTAM 3.38 G: 3; .375 INJECTION, POWDER, LYOPHILIZED, FOR SOLUTION INTRAVENOUS at 13:19

## 2020-10-17 ASSESSMENT — PAIN SCALES - GENERAL
PAINLEVEL_OUTOF10: 5
PAINLEVEL_OUTOF10: 0

## 2020-10-17 ASSESSMENT — PAIN DESCRIPTION - DESCRIPTORS: DESCRIPTORS: PATIENT UNABLE TO DESCRIBE

## 2020-10-17 ASSESSMENT — PAIN DESCRIPTION - LOCATION: LOCATION: GENERALIZED

## 2020-10-17 ASSESSMENT — PAIN DESCRIPTION - PAIN TYPE: TYPE: ACUTE PAIN
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
## 2020-10-17 NOTE — PROGRESS NOTES
[facility] HOSPITALISTS PROGRESS NOTE    10/17/2020 1:48 PM        Name: Amandeep Kwong . Admitted: 10/16/2020  Primary Care Provider: Fabiola Harper MD (Tel: [phone])      Chief Complaint   Patient presents with    Abdominal Pain     Pt from home, via [facility], states she took prilosec at 6AM and now her abdomen hurts. States had N/V X4 but nothing since 10 AM        Brief History: Patient is a 81 yo female with hx HTN, SVT, pancreatic cancer, hypothyroidism. She presented to hospital with epigastric/RUQ abdominal pain associated with n/v. Subjective:  Presently resting in bed. States she feels better compared to admission. Abdominal pain resolved, tolerating clear liquid diet. BP low this afternoon, accompanied with fatigue, responded to 250 cc fluid bolus, BP recheck 103/66. Jewel Haas Denies shortness of breath, chest pain, palpitations.      Reviewed interval ancillary notes    Current Medications  sodium chloride flush 0.9 % injection 10 mL, 2 times per day  sodium chloride flush 0.9 % injection 10 mL, PRN  piperacillin-tazobactam (ZOSYN) 3.375 g in dextrose 5 % 50 mL IVPB (mini-bag), Q8H  sodium chloride flush 0.9 % injection 10 mL, 2 times per day  sodium chloride flush 0.9 % injection 10 mL, PRN  acetaminophen (TYLENOL) tablet 650 mg, Q6H PRN    Or  acetaminophen (TYLENOL) suppository 650 mg, Q6H PRN  polyethylene glycol (GLYCOLAX) packet 17 g, Daily PRN  promethazine (PHENERGAN) tablet 12.5 mg, Q6H PRN    Or  ondansetron (ZOFRAN) injection 4 mg, Q6H PRN  enoxaparin (LOVENOX) injection 40 mg, Daily  potassium chloride (KLOR-CON M) extended release tablet 40 mEq, PRN    Or  potassium bicarb-citric acid (EFFER-K) effervescent tablet 40 mEq, PRN    Or  potassium chloride 10 mEq/100 mL IVPB (Peripheral Line), PRN  magnesium sulfate 2 g in 50 mL IVPB premix, PRN  levothyroxine (SYNTHROID) tablet 100 mcg, Daily  0.9 % sodium chloride infusion, Continuous  pantoprazole (PROTONIX) injection 40 mg, Daily      Objective:  BP 89/61   Pulse 116   Temp 97.6 °F (36.4 °C) (Oral)   Resp 14   Ht 5' 6\" (1.676 m)   Wt 154 lb 6.4 oz (70 kg)   SpO2 98%   BMI 24.92 kg/m²     Intake/Output Summary (Last 24 hours) at 10/17/2020 1348  Last data filed at 10/17/2020 0440  Gross per 24 hour   Intake 100 ml   Output 200 ml   Net -100 ml      Wt Readings from Last 3 Encounters:   10/17/20 154 lb 6.4 oz (70 kg)   06/09/19 145 lb (65.8 kg)   12/10/18 145 lb (65.8 kg)       General appearance:  Appears comfortable  Eyes: Sclera clear. Pupils equal.  ENT: Moist oral mucosa. Trachea midline, no adenopathy. Cardiovascular: Regular rhythm, normal S1, S2. No murmur. No edema in lower extremities  Respiratory: Not using accessory muscles. Good inspiratory effort. Clear to auscultation bilaterally, no wheeze or crackles. GI: Abdomen soft, no tenderness, not distended, normal bowel sounds  Musculoskeletal: Moves all extremities spontaneously  Neurology: CN 2-12 grossly intact. No speech or motor deficits  Psych: Normal affect. Alert and oriented in time, place and person  Skin: Warm, dry, jaundiced    Labs and Tests:  CBC:   Recent Labs     10/16/20  1247 10/17/20  0749   WBC 11.6* 4.6   HGB 13.5 13.1    200     BMP:    Recent Labs     10/16/20  1247 10/17/20  0749    138   K 3.7 3.9    103   CO2 24 23   BUN 17 20   CREATININE 0.9 0.9   GLUCOSE 127* 103*     Hepatic:   Recent Labs     10/16/20  1247 10/17/20  0749   AST 82* 91*   ALT 69* 91*   BILITOT 3.0* 4.0*   ALKPHOS 439* 370*       CT Abdomen/Pelvis 10/16/2020:   Interval development of moderate intra and extrahepatic biliary dilation,    increased pneumobilia and pancreatic ductal dilation.  This likely represents    malfunction of the distal common bile duct stent.  Recommend GI consultation.         Gastric and hiatal hernia distention with fluid. Anne Cheers is transition near the duodenal junction with poor definition of the duodenum.  This could be    reactive with resultant relative gastric outlet obstruction.         Cholelithiasis with mild gallbladder distention with fluid, air and mild    surrounding pericholecystic edema possible cholecystitis.         Gross deformity with severe canal stenosis L2-3 with prior compression    fractures and vertebral augmentation.  This is unchanged.             Problem List  Active Problems:    Pancreatic cancer ([facility] [address]    Biliary stent obstruction, initial encounter  Resolved Problems:    * No resolved hospital problems. *       Assessment & Plan:   1. Abdominal pain. Hx pancreatic cancer (dx 6/2019). S/p ERCP with biliary stent placement 6/2019, no follow up or treatment since. CT scan this admission concerning for malfunction of stent, concern for gastric outlet obstruction as well. GI and surgery on case. Surgery not indicated at present, ERCP with stent placement planned for Monday. 2. Bacteremia. Blood cultures + for Klebsiella and Streptococcus. Believed biliary sepsis secondary to obstructed/migrated biliary stentCurrently on Zosyn. WBC 11.6->4.6, Temp max 99.9  Consult ID for bacteremia. CBC in am.   3. Hypotension. Likely secondary to volume depletion. Responded to fluid bolus. Continue IV fluids at 125 ml/hr. Continue to hold diltiazem and metoprolol. In sinus rhythm. 4. Hypothyroidism. Continue levothyroxine. Check TSH level in am.       Diet: DIET CLEAR LIQUID;  Code:Full Code  DVT PPX: enoxaparin.        AME Barajas - CNP   10/17/2020 1:48 PM

## 2020-10-17 NOTE — PROGRESS NOTES
Patient called this RN into room, thrashing around, loudly moaning, complaining about inability to warm up. Temp 97.2, unable to get blood pressure d/t thrashing, started dry heaving. Zofran administered, warm blankets applied, thrashing stopped, patient fell asleep. Notified Dr. Edis Mccoy, NPO status ordered. Notified MD via [employer].

## 2020-10-17 NOTE — PLAN OF CARE
[place] [id number] [facility] Laparoscopic [facility]        Assessment & Plan of Care    History of Present Illness: Ms. Emelina Woodard is a 80 y.o. female who presents with a chief complaint of nausea and vomiting. Associated symptoms include jaundice. She denies any abdominal pain, fevers, chills, weight loss, chest pain, or SOB. She was diagnosed with pancreatic cancer a couple of years ago and reports that her cancer \"went away on its own because the lord healed it\". She refuses to discuss the topic and states \"I'm cancer free, period. \"  She refused surgery at the time of her diagnosis. She did have an ERCP with biliary stent placement, which has since dislodged. Prior abdominal surgery includes a hysterectomy. Other medical history includes hypertension, arthritis, and SVT. No blood thinners. Nonsmoker. Physical Exam:  CONSTITUTIONAL:  alert, no apparent distress and normal weight  NEUROLOGIC:  Mental Status Exam:  Level of Alertness:   awake  Orientation:   person, place, time  EYES:  icteric bilaterally  ENT:  normocepalic, without obvious abnormality  NECK:  supple, symmetrical, trachea midline  LUNGS:  clear to auscultation  CARDIOVASCULAR:  regular rate and rhythm and no murmur noted  ABDOMEN: soft, non-distended, non-tender, voluntary guarding absent, no masses palpated, normal bowel sounds, and hernia absent  Extremities: no edema  SKIN:  no bruising or bleeding and normal skin color, texture, turgor    Assessment:  Nausea and vomiting  History of pancreatic cancer with biliary stent placement  cholelithiasis     Plan:  1. No plans for surgical intervention at this time  2. NPO  3. IV hydration; monitor and correct electrolytes  4. Antibiotics  5. Activity as tolerated  6. PRN analgesics and antiemetics--minimizing narcotics as tolerated  7.  Management of medical comorbid etiologies per primary team and consulting services    EDUCATION:  Educated patient on plan of care and disease process--all questions answered. Plans discussed with patient and nursing. Reviewed and discussed with Dr. Riley Rivera consult to follow.       Signed:  AME Bee CNP  10/17/2020 8:16 AM

## 2020-10-18 ENCOUNTER — ANESTHESIA EVENT (OUTPATIENT)
Dept: ENDOSCOPY | Age: 85
DRG: 919 | End: 2020-10-18
Payer: MEDICARE

## 2020-10-18 ENCOUNTER — ANESTHESIA (OUTPATIENT)
Dept: ENDOSCOPY | Age: 85
DRG: 919 | End: 2020-10-18
Payer: MEDICARE

## 2020-10-18 ENCOUNTER — APPOINTMENT (OUTPATIENT)
Dept: GENERAL RADIOLOGY | Age: 85
DRG: 919 | End: 2020-10-18
Payer: MEDICARE

## 2020-10-18 VITALS
DIASTOLIC BLOOD PRESSURE: 59 MMHG | SYSTOLIC BLOOD PRESSURE: 109 MMHG | RESPIRATION RATE: 46 BRPM | OXYGEN SATURATION: 98 %

## 2020-10-18 LAB
A/G RATIO: 0.9 (ref 1.1–2.2)
ALBUMIN SERPL-MCNC: 2.6 G/DL (ref 3.4–5)
ALP BLD-CCNC: 291 U/L (ref 40–129)
ALT SERPL-CCNC: 101 U/L (ref 10–40)
ANION GAP SERPL CALCULATED.3IONS-SCNC: 13 MMOL/L (ref 3–16)
AST SERPL-CCNC: 92 U/L (ref 15–37)
BASOPHILS ABSOLUTE: 0 K/UL (ref 0–0.2)
BASOPHILS RELATIVE PERCENT: 0.1 %
BILIRUB SERPL-MCNC: 3.1 MG/DL (ref 0–1)
BUN BLDV-MCNC: 27 MG/DL (ref 7–20)
CALCIUM SERPL-MCNC: 7.4 MG/DL (ref 8.3–10.6)
CHLORIDE BLD-SCNC: 107 MMOL/L (ref 99–110)
CO2: 20 MMOL/L (ref 21–32)
CREAT SERPL-MCNC: 1 MG/DL (ref 0.6–1.2)
EOSINOPHILS ABSOLUTE: 0.6 K/UL (ref 0–0.6)
EOSINOPHILS RELATIVE PERCENT: 4.2 %
GFR AFRICAN AMERICAN: >60
GFR NON-AFRICAN AMERICAN: 52
GLOBULIN: 2.8 G/DL
GLUCOSE BLD-MCNC: 119 MG/DL (ref 70–99)
HCT VFR BLD CALC: 39.1 % (ref 36–48)
HEMOGLOBIN: 12.9 G/DL (ref 12–16)
LACTIC ACID: 1.5 MMOL/L (ref 0.4–2)
LIPASE: 25 U/L (ref 13–60)
LYMPHOCYTES ABSOLUTE: 0.6 K/UL (ref 1–5.1)
LYMPHOCYTES RELATIVE PERCENT: 3.7 %
MCH RBC QN AUTO: 30.6 PG (ref 26–34)
MCHC RBC AUTO-ENTMCNC: 33 G/DL (ref 31–36)
MCV RBC AUTO: 92.7 FL (ref 80–100)
MONOCYTES ABSOLUTE: 0.4 K/UL (ref 0–1.3)
MONOCYTES RELATIVE PERCENT: 2.7 %
NEUTROPHILS ABSOLUTE: 13.4 K/UL (ref 1.7–7.7)
NEUTROPHILS RELATIVE PERCENT: 89.3 %
PDW BLD-RTO: 12.8 % (ref 12.4–15.4)
PLATELET # BLD: 174 K/UL (ref 135–450)
PMV BLD AUTO: 10.5 FL (ref 5–10.5)
POTASSIUM SERPL-SCNC: 3.9 MMOL/L (ref 3.5–5.1)
RBC # BLD: 4.22 M/UL (ref 4–5.2)
SODIUM BLD-SCNC: 140 MMOL/L (ref 136–145)
TOTAL PROTEIN: 5.4 G/DL (ref 6.4–8.2)
TRANSFERRIN: 209 MG/DL (ref 200–360)
TSH REFLEX: 1.55 UIU/ML (ref 0.27–4.2)
WBC # BLD: 15 K/UL (ref 4–11)

## 2020-10-18 PROCEDURE — 85025 COMPLETE CBC W/AUTO DIFF WBC: CPT

## 2020-10-18 PROCEDURE — 2580000003 HC RX 258: Performed by: ANESTHESIOLOGY

## 2020-10-18 PROCEDURE — 6360000002 HC RX W HCPCS: Performed by: ANESTHESIOLOGY

## 2020-10-18 PROCEDURE — BF111ZZ FLUOROSCOPY OF BILIARY AND PANCREATIC DUCTS USING LOW OSMOLAR CONTRAST: ICD-10-PCS | Performed by: INTERNAL MEDICINE

## 2020-10-18 PROCEDURE — 6360000004 HC RX CONTRAST MEDICATION: Performed by: INTERNAL MEDICINE

## 2020-10-18 PROCEDURE — 6360000002 HC RX W HCPCS: Performed by: INTERNAL MEDICINE

## 2020-10-18 PROCEDURE — C1769 GUIDE WIRE: HCPCS | Performed by: INTERNAL MEDICINE

## 2020-10-18 PROCEDURE — 83690 ASSAY OF LIPASE: CPT

## 2020-10-18 PROCEDURE — 7100000001 HC PACU RECOVERY - ADDTL 15 MIN: Performed by: INTERNAL MEDICINE

## 2020-10-18 PROCEDURE — 88305 TISSUE EXAM BY PATHOLOGIST: CPT

## 2020-10-18 PROCEDURE — 0DB98ZX EXCISION OF DUODENUM, VIA NATURAL OR ARTIFICIAL OPENING ENDOSCOPIC, DIAGNOSTIC: ICD-10-PCS | Performed by: INTERNAL MEDICINE

## 2020-10-18 PROCEDURE — 2709999900 HC NON-CHARGEABLE SUPPLY: Performed by: INTERNAL MEDICINE

## 2020-10-18 PROCEDURE — 3700000001 HC ADD 15 MINUTES (ANESTHESIA): Performed by: INTERNAL MEDICINE

## 2020-10-18 PROCEDURE — 3609014200 HC ERCP W/BIOPSY SINGLE/MULTIPLE: Performed by: INTERNAL MEDICINE

## 2020-10-18 PROCEDURE — 2580000003 HC RX 258: Performed by: INTERNAL MEDICINE

## 2020-10-18 PROCEDURE — 0FPB8DZ REMOVAL OF INTRALUMINAL DEVICE FROM HEPATOBILIARY DUCT, VIA NATURAL OR ARTIFICIAL OPENING ENDOSCOPIC: ICD-10-PCS | Performed by: INTERNAL MEDICINE

## 2020-10-18 PROCEDURE — 7100000000 HC PACU RECOVERY - FIRST 15 MIN: Performed by: INTERNAL MEDICINE

## 2020-10-18 PROCEDURE — 36415 COLL VENOUS BLD VENIPUNCTURE: CPT

## 2020-10-18 PROCEDURE — 74330 X-RAY BILE/PANC ENDOSCOPY: CPT

## 2020-10-18 PROCEDURE — 6370000000 HC RX 637 (ALT 250 FOR IP): Performed by: NURSE PRACTITIONER

## 2020-10-18 PROCEDURE — 99232 SBSQ HOSP IP/OBS MODERATE 35: CPT | Performed by: SURGERY

## 2020-10-18 PROCEDURE — 6370000000 HC RX 637 (ALT 250 FOR IP): Performed by: INTERNAL MEDICINE

## 2020-10-18 PROCEDURE — 3700000000 HC ANESTHESIA ATTENDED CARE: Performed by: INTERNAL MEDICINE

## 2020-10-18 PROCEDURE — 84443 ASSAY THYROID STIM HORMONE: CPT

## 2020-10-18 PROCEDURE — C9113 INJ PANTOPRAZOLE SODIUM, VIA: HCPCS | Performed by: INTERNAL MEDICINE

## 2020-10-18 PROCEDURE — 80053 COMPREHEN METABOLIC PANEL: CPT

## 2020-10-18 PROCEDURE — 3609014500 HC ERCP BILIARY/PANC DUCT STENT EXCHANGE W/DIL&WIRE: Performed by: INTERNAL MEDICINE

## 2020-10-18 PROCEDURE — APPSS15 APP SPLIT SHARED TIME 0-15 MINUTES: Performed by: NURSE PRACTITIONER

## 2020-10-18 PROCEDURE — 2500000003 HC RX 250 WO HCPCS: Performed by: ANESTHESIOLOGY

## 2020-10-18 PROCEDURE — 83605 ASSAY OF LACTIC ACID: CPT

## 2020-10-18 PROCEDURE — 0F798DZ DILATION OF COMMON BILE DUCT WITH INTRALUMINAL DEVICE, VIA NATURAL OR ARTIFICIAL OPENING ENDOSCOPIC: ICD-10-PCS | Performed by: INTERNAL MEDICINE

## 2020-10-18 PROCEDURE — 99222 1ST HOSP IP/OBS MODERATE 55: CPT | Performed by: INTERNAL MEDICINE

## 2020-10-18 PROCEDURE — 1200000000 HC SEMI PRIVATE

## 2020-10-18 PROCEDURE — APPNB30 APP NON BILLABLE TIME 0-30 MINS: Performed by: NURSE PRACTITIONER

## 2020-10-18 PROCEDURE — C2625 STENT, NON-COR, TEM W/DEL SY: HCPCS | Performed by: INTERNAL MEDICINE

## 2020-10-18 DEVICE — SOLUS, DOUBLE PIGTAIL STENT WITH INTRODUCER
Type: IMPLANTABLE DEVICE | Status: NON-FUNCTIONAL
Brand: SOLUS
Removed: 2020-10-21

## 2020-10-18 RX ORDER — VASOPRESSIN 20 U/ML
INJECTION PARENTERAL PRN
Status: DISCONTINUED | OUTPATIENT
Start: 2020-10-18 | End: 2020-10-18 | Stop reason: SDUPTHER

## 2020-10-18 RX ORDER — SODIUM CHLORIDE, SODIUM LACTATE, POTASSIUM CHLORIDE, CALCIUM CHLORIDE 600; 310; 30; 20 MG/100ML; MG/100ML; MG/100ML; MG/100ML
INJECTION, SOLUTION INTRAVENOUS CONTINUOUS
Status: DISCONTINUED | OUTPATIENT
Start: 2020-10-18 | End: 2020-10-21

## 2020-10-18 RX ORDER — SODIUM CHLORIDE 9 MG/ML
INJECTION, SOLUTION INTRAVENOUS CONTINUOUS PRN
Status: DISCONTINUED | OUTPATIENT
Start: 2020-10-18 | End: 2020-10-18 | Stop reason: SDUPTHER

## 2020-10-18 RX ORDER — PHENYLEPHRINE HCL IN 0.9% NACL 1 MG/10 ML
SYRINGE (ML) INTRAVENOUS PRN
Status: DISCONTINUED | OUTPATIENT
Start: 2020-10-18 | End: 2020-10-18 | Stop reason: SDUPTHER

## 2020-10-18 RX ORDER — FENTANYL CITRATE 50 UG/ML
25 INJECTION, SOLUTION INTRAMUSCULAR; INTRAVENOUS EVERY 5 MIN PRN
Status: DISCONTINUED | OUTPATIENT
Start: 2020-10-18 | End: 2020-10-18 | Stop reason: HOSPADM

## 2020-10-18 RX ORDER — ONDANSETRON 2 MG/ML
4 INJECTION INTRAMUSCULAR; INTRAVENOUS
Status: DISCONTINUED | OUTPATIENT
Start: 2020-10-18 | End: 2020-10-18 | Stop reason: HOSPADM

## 2020-10-18 RX ORDER — ONDANSETRON 2 MG/ML
INJECTION INTRAMUSCULAR; INTRAVENOUS PRN
Status: DISCONTINUED | OUTPATIENT
Start: 2020-10-18 | End: 2020-10-18 | Stop reason: SDUPTHER

## 2020-10-18 RX ORDER — PROCHLORPERAZINE EDISYLATE 5 MG/ML
5 INJECTION INTRAMUSCULAR; INTRAVENOUS
Status: DISCONTINUED | OUTPATIENT
Start: 2020-10-18 | End: 2020-10-18 | Stop reason: HOSPADM

## 2020-10-18 RX ORDER — DEXAMETHASONE SODIUM PHOSPHATE 4 MG/ML
INJECTION, SOLUTION INTRA-ARTICULAR; INTRALESIONAL; INTRAMUSCULAR; INTRAVENOUS; SOFT TISSUE PRN
Status: DISCONTINUED | OUTPATIENT
Start: 2020-10-18 | End: 2020-10-18 | Stop reason: SDUPTHER

## 2020-10-18 RX ORDER — SODIUM CHLORIDE 0.9 % (FLUSH) 0.9 %
10 SYRINGE (ML) INJECTION EVERY 12 HOURS SCHEDULED
Status: DISCONTINUED | OUTPATIENT
Start: 2020-10-18 | End: 2020-10-27 | Stop reason: HOSPADM

## 2020-10-18 RX ORDER — LABETALOL HYDROCHLORIDE 5 MG/ML
5 INJECTION, SOLUTION INTRAVENOUS EVERY 10 MIN PRN
Status: DISCONTINUED | OUTPATIENT
Start: 2020-10-18 | End: 2020-10-18 | Stop reason: HOSPADM

## 2020-10-18 RX ORDER — SODIUM CHLORIDE 0.9 % (FLUSH) 0.9 %
10 SYRINGE (ML) INJECTION PRN
Status: DISCONTINUED | OUTPATIENT
Start: 2020-10-18 | End: 2020-10-18 | Stop reason: SDUPTHER

## 2020-10-18 RX ORDER — PROPOFOL 10 MG/ML
INJECTION, EMULSION INTRAVENOUS PRN
Status: DISCONTINUED | OUTPATIENT
Start: 2020-10-18 | End: 2020-10-18 | Stop reason: SDUPTHER

## 2020-10-18 RX ORDER — SODIUM CHLORIDE 0.9 % (FLUSH) 0.9 %
10 SYRINGE (ML) INJECTION PRN
Status: DISCONTINUED | OUTPATIENT
Start: 2020-10-18 | End: 2020-10-18 | Stop reason: HOSPADM

## 2020-10-18 RX ORDER — HYDRALAZINE HYDROCHLORIDE 20 MG/ML
5 INJECTION INTRAMUSCULAR; INTRAVENOUS EVERY 10 MIN PRN
Status: DISCONTINUED | OUTPATIENT
Start: 2020-10-18 | End: 2020-10-18 | Stop reason: HOSPADM

## 2020-10-18 RX ORDER — ETOMIDATE 2 MG/ML
INJECTION INTRAVENOUS PRN
Status: DISCONTINUED | OUTPATIENT
Start: 2020-10-18 | End: 2020-10-18 | Stop reason: SDUPTHER

## 2020-10-18 RX ORDER — LIDOCAINE HYDROCHLORIDE 20 MG/ML
INJECTION, SOLUTION EPIDURAL; INFILTRATION; INTRACAUDAL; PERINEURAL PRN
Status: DISCONTINUED | OUTPATIENT
Start: 2020-10-18 | End: 2020-10-18 | Stop reason: SDUPTHER

## 2020-10-18 RX ORDER — SODIUM CHLORIDE 0.9 % (FLUSH) 0.9 %
10 SYRINGE (ML) INJECTION EVERY 12 HOURS SCHEDULED
Status: DISCONTINUED | OUTPATIENT
Start: 2020-10-18 | End: 2020-10-18 | Stop reason: SDUPTHER

## 2020-10-18 RX ORDER — SUCCINYLCHOLINE/SOD CL,ISO/PF 200MG/10ML
SYRINGE (ML) INTRAVENOUS PRN
Status: DISCONTINUED | OUTPATIENT
Start: 2020-10-18 | End: 2020-10-18 | Stop reason: SDUPTHER

## 2020-10-18 RX ORDER — LIDOCAINE HYDROCHLORIDE 10 MG/ML
1 INJECTION, SOLUTION EPIDURAL; INFILTRATION; INTRACAUDAL; PERINEURAL
Status: DISCONTINUED | OUTPATIENT
Start: 2020-10-18 | End: 2020-10-18 | Stop reason: HOSPADM

## 2020-10-18 RX ORDER — HYDROMORPHONE HYDROCHLORIDE 1 MG/ML
0.5 INJECTION, SOLUTION INTRAMUSCULAR; INTRAVENOUS; SUBCUTANEOUS ONCE
Status: DISCONTINUED | OUTPATIENT
Start: 2020-10-18 | End: 2020-10-27 | Stop reason: HOSPADM

## 2020-10-18 RX ADMIN — VASOPRESSIN 2 UNITS: 20 INJECTION INTRAVENOUS at 09:28

## 2020-10-18 RX ADMIN — PIPERACILLIN AND TAZOBACTAM 3.38 G: 3; .375 INJECTION, POWDER, LYOPHILIZED, FOR SOLUTION INTRAVENOUS at 05:40

## 2020-10-18 RX ADMIN — ONDANSETRON 4 MG: 2 INJECTION INTRAMUSCULAR; INTRAVENOUS at 08:57

## 2020-10-18 RX ADMIN — PROPOFOL 30 MG: 10 INJECTION, EMULSION INTRAVENOUS at 08:57

## 2020-10-18 RX ADMIN — Medication 200 MCG: at 09:10

## 2020-10-18 RX ADMIN — Medication 100 MCG: at 08:59

## 2020-10-18 RX ADMIN — ETOMIDATE 16 MG: 20 INJECTION, SOLUTION INTRAVENOUS at 08:57

## 2020-10-18 RX ADMIN — SODIUM CHLORIDE, POTASSIUM CHLORIDE, SODIUM LACTATE AND CALCIUM CHLORIDE: 600; 310; 30; 20 INJECTION, SOLUTION INTRAVENOUS at 22:59

## 2020-10-18 RX ADMIN — SODIUM CHLORIDE: 9 INJECTION, SOLUTION INTRAVENOUS at 00:08

## 2020-10-18 RX ADMIN — Medication 100 MCG: at 09:06

## 2020-10-18 RX ADMIN — PANTOPRAZOLE SODIUM 40 MG: 40 INJECTION, POWDER, FOR SOLUTION INTRAVENOUS at 11:39

## 2020-10-18 RX ADMIN — Medication 100 MCG: at 09:33

## 2020-10-18 RX ADMIN — Medication 100 MCG: at 08:57

## 2020-10-18 RX ADMIN — Medication 100 MCG: at 09:21

## 2020-10-18 RX ADMIN — METOPROLOL TARTRATE 12.5 MG: 25 TABLET, FILM COATED ORAL at 21:05

## 2020-10-18 RX ADMIN — SODIUM CHLORIDE, POTASSIUM CHLORIDE, SODIUM LACTATE AND CALCIUM CHLORIDE: 600; 310; 30; 20 INJECTION, SOLUTION INTRAVENOUS at 11:40

## 2020-10-18 RX ADMIN — ACETAMINOPHEN 650 MG: 325 TABLET ORAL at 05:42

## 2020-10-18 RX ADMIN — DEXAMETHASONE SODIUM PHOSPHATE 4 MG: 4 INJECTION, SOLUTION INTRAMUSCULAR; INTRAVENOUS at 08:57

## 2020-10-18 RX ADMIN — LEVOTHYROXINE SODIUM 100 MCG: 0.1 TABLET ORAL at 05:40

## 2020-10-18 RX ADMIN — Medication 100 MG: at 08:57

## 2020-10-18 RX ADMIN — SODIUM CHLORIDE: 9 INJECTION, SOLUTION INTRAVENOUS at 08:49

## 2020-10-18 RX ADMIN — VASOPRESSIN 2 UNITS: 20 INJECTION INTRAVENOUS at 09:10

## 2020-10-18 RX ADMIN — ENOXAPARIN SODIUM 40 MG: 40 INJECTION SUBCUTANEOUS at 15:25

## 2020-10-18 RX ADMIN — LIDOCAINE HYDROCHLORIDE 80 MG: 20 INJECTION, SOLUTION EPIDURAL; INFILTRATION; INTRACAUDAL; PERINEURAL at 08:57

## 2020-10-18 RX ADMIN — MEROPENEM 1 G: 1 INJECTION, POWDER, FOR SOLUTION INTRAVENOUS at 21:18

## 2020-10-18 RX ADMIN — ONDANSETRON 4 MG: 2 INJECTION INTRAMUSCULAR; INTRAVENOUS at 01:50

## 2020-10-18 RX ADMIN — VASOPRESSIN 2 UNITS: 20 INJECTION INTRAVENOUS at 09:39

## 2020-10-18 RX ADMIN — MEROPENEM 1 G: 1 INJECTION, POWDER, FOR SOLUTION INTRAVENOUS at 11:39

## 2020-10-18 ASSESSMENT — PULMONARY FUNCTION TESTS
PIF_VALUE: 3
PIF_VALUE: 19
PIF_VALUE: 5
PIF_VALUE: 16
PIF_VALUE: 20
PIF_VALUE: 19
PIF_VALUE: 8
PIF_VALUE: 12
PIF_VALUE: 19
PIF_VALUE: 5
PIF_VALUE: 20
PIF_VALUE: 20
PIF_VALUE: 35
PIF_VALUE: 15
PIF_VALUE: 12
PIF_VALUE: 21
PIF_VALUE: 13
PIF_VALUE: 15
PIF_VALUE: 21
PIF_VALUE: 2
PIF_VALUE: 10
PIF_VALUE: 21
PIF_VALUE: 20
PIF_VALUE: 21
PIF_VALUE: 20
PIF_VALUE: 1
PIF_VALUE: 7
PIF_VALUE: 1
PIF_VALUE: 21
PIF_VALUE: 1
PIF_VALUE: 20
PIF_VALUE: 15
PIF_VALUE: 22
PIF_VALUE: 20
PIF_VALUE: 4
PIF_VALUE: 20
PIF_VALUE: 20
PIF_VALUE: 1
PIF_VALUE: 20
PIF_VALUE: 4
PIF_VALUE: 20
PIF_VALUE: 24
PIF_VALUE: 6
PIF_VALUE: 5
PIF_VALUE: 4
PIF_VALUE: 1
PIF_VALUE: 20
PIF_VALUE: 21
PIF_VALUE: 1
PIF_VALUE: 1
PIF_VALUE: 20
PIF_VALUE: 21
PIF_VALUE: 20
PIF_VALUE: 2
PIF_VALUE: 18
PIF_VALUE: 5
PIF_VALUE: 19
PIF_VALUE: 21
PIF_VALUE: 1
PIF_VALUE: 3
PIF_VALUE: 21
PIF_VALUE: 20
PIF_VALUE: 20
PIF_VALUE: 21
PIF_VALUE: 21
PIF_VALUE: 5
PIF_VALUE: 15
PIF_VALUE: 2

## 2020-10-18 ASSESSMENT — PAIN DESCRIPTION - LOCATION: LOCATION: LEG

## 2020-10-18 ASSESSMENT — PAIN DESCRIPTION - PAIN TYPE: TYPE: CHRONIC PAIN

## 2020-10-18 ASSESSMENT — PAIN SCALES - GENERAL
PAINLEVEL_OUTOF10: 8
PAINLEVEL_OUTOF10: 0

## 2020-10-18 ASSESSMENT — ENCOUNTER SYMPTOMS: SHORTNESS OF BREATH: 0

## 2020-10-18 NOTE — PROGRESS NOTES
Spoke to Dr. Alejandro Castellano about pt's current condition, sent a secure message to night shift hospitalist about pt's status and current BP, also notified night shift hospitalist that GI would like hospitalist to see patient. Pt is alert and oriented x4 at this time. Telemetry on. Pt denies any complaints of dizziness or nausea and vomiting at this time.    Vitals:    10/17/20 2015   BP: 86/61   Pulse: 114   Resp: 18   Temp: 98.3 °F (36.8 °C)   SpO2: 94%
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
## 2020-10-18 NOTE — PROGRESS NOTES
Update:    I was paged by Dr. Merissa Regalado, patient's son-in-law about her condition deteriorating. Patient is afebrile but reportedly dropped her BP to 80s, although I see that she has had even lower blood pressure during the day as well. She is reported to be comfortably sleeping now after getting Zofran per RN. I asked the RN to call the primary service to evaluate her and manage her hemodynamics if necessary. Dr. Michael Raymond will evaluate her again tomorrow. No GI intervention is planned overnight give the clinical parameters. The nursing note from 6.50 PM says that Dr. Michael Raymond was notified about her condition. I am on call for the group and I did not receive any notification. [address] does not use [employer] for communication and I requested the RN to page our group through our answering service  if we need to be contacted.     Tavon Colbert

## 2020-10-18 NOTE — BRIEF OP NOTE
Brief Postoperative Note - Full Note in Chart Review/Procedures tab       Patient: Renetta Escoto  YOB: 1928  MRN: [id number]    Date of Procedure: 10/18/2020    Pre-Op Diagnosis:  Acute cholangitis     Pancreatic CA    Post-Op Diagnosis: Same       Procedure(s):  ERCP STENT REMOVAL/EXCHANGE  ERCP BIOPSY    Surgeon(s):  Eriberto Yeboah MD    Assistant:  * No surgical staff found *    Anesthesia: General    Estimated Blood Loss (mL): Minimal    Complications: None    Specimens:   ID Type Source Tests Collected by Time Destination   A :  Tissue Duodenum SURGICAL PATHOLOGY Eriberto Yeboah MD 10/18/2020 0704        Implants:  Implant Name Type Inv. Item Serial No.  Lot No. LRB No. Used Action   STENT BILI SOLUS AND INTRO SET W/ [id number] Stent:Biliary/Pancreatic/Iliac STENT BILI SOLUS AND INTRO SET W/ [id number]  [address] [place], [place] [id number] N/A 1 Implanted         Drains: * No LDAs found *    Findings:  1) Distally migrated expandable metal mesh stent impacted in periampullary duodenum - removed. 2) Suppurative cholangitis    3) Mildly dilated biliary tree with no obvious stenosis    4) Indurated stenotic periampullary duodenum - biopsied    5) New 10 Fr, 4 cm double-pigtail plastic biliary stent placement    Rec:  1) Clear liquid diet today and advance to low fat diet in AM as tolerated. 2) Change of antibiotics noted. 3) Follow up as inpatient. 4) Repeat ERC in 6 - 8 weeks to change/remove stent.     Electronically signed by Eriberto Yeboah MD on 10/18/2020 at 9:50 AM

## 2020-10-18 NOTE — PROGRESS NOTES
Pt awakens to verbal commands, drifts off to sleep easily, follows commands, cont to be tachy 's, bp stable, Dr Steffi Sandhoff at bedside to speak with pt on procedure and plan, weaned to 2 LNC, BP stable,phase 1 discharge criteria met, ok to transfer per anesthesia.
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
## 2020-10-18 NOTE — PROGRESS NOTES
[facility] and Laparoscopic [facility]        Progress Note    Patient Name: Fabian Castro  MRN: [id number]  YOB: 1928  Date of Evaluation: 10/18/2020    Chief Complaint: Nausea and vomiting    Subjective:  No acute events overnight  Patient seen immediately post-ERCP, very drowsy  No signs of acute distress      Vital Signs:  Patient Vitals for the past 24 hrs:   BP Temp Temp src Pulse Resp SpO2   10/18/20 0849 (!) 101/55 -- -- 120 20 93 %   10/18/20 0655 -- 98 °F (36.7 °C) Oral -- 20 --   10/18/20 0420 103/70 97.9 °F (36.6 °C) Oral 110 20 94 %   10/18/20 0200 126/78 -- -- -- -- --   10/18/20 0150 (!) 152/89 98 °F (36.7 °C) Oral 90 20 96 %   10/18/20 0145 (!) 81/51 98 °F (36.7 °C) Oral -- 18 --   10/18/20 0000 96/62 97.7 °F (36.5 °C) Oral -- 16 --   10/17/20 2225 (!) 96/57 -- -- 111 16 --   10/17/20 2120 95/62 98.3 °F (36.8 °C) Oral 111 18 --   10/17/20 2015 86/61 98.3 °F (36.8 °C) Oral 114 18 94 %   10/17/20 1733 104/65 -- -- 83 -- --   10/17/20 1402 103/61 -- -- -- -- --   10/17/20 1337 89/61 -- -- 116 -- --   10/17/20 1315 (!) 72/45 97.6 °F (36.4 °C) Oral 118 14 98 %      TEMPERATURE HISTORY 24H: Temp (24hrs), Av °F (36.7 °C), Min:97.6 °F (36.4 °C), Max:98.3 °F (36.8 °C)    BLOOD PRESSURE HISTORY: Systolic (85CIU), CMY:040 , Min:72 , JOSEPH:195    Diastolic (51TIS), TVV:14, Min:45, Max:89      Intake/Output:  I/O last 3 completed shifts:  In: -   Out: 250 [Urine:250]  I/O this shift:   In: 550 [I.V.:550]  Out: -   Drain/tube Output:       Physical Exam:  General: sedated, somnolent, no acute distress  Cardiovascular:  regular rate and rhythm and no murmur noted  Lungs: clear to auscultation  Abdomen: soft, nontender, nondistended, bowel sounds normal   Skin: Juandice     Labs:  CBC:    Recent Labs     10/16/20  1247 10/17/20  0749 10/18/20  0535   WBC 11.6* 4.6 15.0*   HGB 13.5 13.1 12.9   HCT 40.9 40.0 39.1    200 174     BMP:    Recent Labs     10/16/20  1247 10/17/20  0749 10/18/20  0535    138 140   K 3.7 3.9 3.9    103 107   CO2 24 23 20*   BUN 17 20 27*   CREATININE 0.9 0.9 1.0   GLUCOSE 127* 103* 119*     Hepatic:    Recent Labs     10/16/20  1247 10/17/20  0749 10/18/20  0535   AST 82* 91* 92*   ALT 69* 91* 101*   BILITOT 3.0* 4.0* 3.1*   ALKPHOS 439* 370* 291*     Amylase:  No results found for: AMYLASE  Lipase:    Lab Results   Component Value Date    LIPASE 25.0 10/18/2020    LIPASE 47.0 10/17/2020    LIPASE 259.0 10/16/2020      Mag:    Lab Results   Component Value Date    MG 1.90 10/17/2020     Phos:     Lab Results   Component Value Date    PHOS 2.4 10/17/2020    PHOS 2.8 06/08/2019      Coags:   Lab Results   Component Value Date    PROTIME 15.7 10/17/2020    INR 1.35 10/17/2020    APTT 31.9 10/17/2020       Cultures:  Anaerobic culture  No results found for: LABANAE  Fungus stain  No results found for requested labs within last 30 days. Gram stain  No results found for requested labs within last 30 days.      Organism  Lab Results   Component Value Date/Time    ORG Klebsiella pneumoniae DNA Detected (A) 10/16/2020 01:49 PM    ORG Streptococcus species DNA Detected (A) 10/16/2020 01:49 PM     Surgical culture  No results found for: CXSURG  Blood culture 1  Results in Past 30 Days  Result Component Current Result Ref Range Previous Result Ref Range   Blood Culture, Routine Gram stain Aerobic bottle:  Gram negative rods  Information to follow  Gram positive cocci in chains and/or pairs  resembling Streptococcus  Information to follow   (A) (10/16/2020)  Not in Time Range      Blood culture 2  Results in Past 30 Days  Result Component Current Result Ref Range Previous Result Ref Range   Culture, Blood 2 Gram stain Aerobic bottle:  Gram negative rods  Information to follow  Gram positive cocci in chains and/or pairs  resembling Streptococcus  Information to follow   (A) (10/16/2020)  Not in Time Range     See additional report for complete BCID panel. (10/16/2020)       See additional report for complete BCID panel. (10/16/2020)        Fecal occult  No results found for requested labs within last 30 days. GI bacterial pathogens by PCR  No results found for requested labs within last 30 days. C. difficile  No results found for requested labs within last 30 days. Urine culture  Lab Results   Component Value Date    LABURIN  06/08/2019     <50,000 CFU/ml mixed skin/urogenital nury. No further workup       Pathology:  Pathology results pending     Imaging:  I have personally reviewed the following films:    Ct Abdomen Pelvis W Iv Contrast Additional Contrast? None    Result Date: 10/16/2020  EXAMINATION: CT OF THE ABDOMEN AND PELVIS WITH CONTRAST 10/16/2020 1:33 pm TECHNIQUE: CT of the abdomen and pelvis was performed with the administration of intravenous contrast. Multiplanar reformatted images are provided for review. Dose modulation, iterative reconstruction, and/or weight based adjustment of the mA/kV was utilized to reduce the radiation dose to as low as reasonably achievable. COMPARISON: 08/26/2020 HISTORY: ORDERING SYSTEM PROVIDED HISTORY: RUQ and RLQ abdominal pain TECHNOLOGIST PROVIDED HISTORY: If patient is on cardiac monitor and/or pulse ox, they may be taken off cardiac monitor and pulse ox, left on O2 if currently on. All monitors reattached when patient returns to room. Additional Contrast?->None Reason for exam:->RUQ and RLQ abdominal pain Reason for Exam: abd pain Acuity: Unknown Type of Exam: Unknown FINDINGS: Lower Chest: Lung bases are unremarkable. Large hiatal hernia is present containing fluid. Organs; 1. Liver: There is moderate intrahepatic biliary dilation, increased from the prior study. Air is seen within the biliary system non dependently also increased from the prior study. No definitive focal suspicious liver lesion is seen.  2. Gallbladder: Gallbladder is filled with fluid, stones layering dependently and air non dependently. There is some fluid surrounding the gallbladder anterolateral margin adjacent to the liver parenchyma. Biliary dilation is noted increased from the prior study with intra biliary air. Maximum dimension 2.1 cm above the biliary stent, increased considerably from approximately 6-7 mm. The stent position is unchanged. There is some air and low-density material within the stent lumen. No obvious stones are seen within the distal duct or stent. 3. Spleen: Normal. 4. Pancreas: The pancreas is somewhat small or atrophic. There is moderate pancreatic duct dilation new from the prior study. There is also dilation of the accessory duct in the body and uncinate process. 5. Kidneys: Unremarkable without hydronephrosis. Moderate-size simple right renal cyst unchanged. 6. Adrenal glands: Normal. GI/Bowel: The stomach is filled with fluid as is the hiatal hernia. The distal stomach tapers with poor definition of the duodenum which is apparently collapsed. The remainder the small bowel is unremarkable with a few fluid-filled nondistended loops. There is formed stool throughout the colon with multiple diverticuli greater in the sigmoid colon. No colonic dilation. Pelvis: Urinary bladder is unremarkable. There is no free pelvic fluid. Peritoneum/Retroperitoneum: There is no mass or adenopathy. Vasculature: Unremarkable for age. Soft Tissues/Bones: There is moderate compression deformity from old compression fracture L2 vertebral body with augmentation and L3 vertebral body minimal compression with augmentation. Posterior bony extension at the L2 level results in moderate to severe canal stenosis. This is greatest as seen on image 78. There is also deformity of the pedicle due to collapse and facet arthropathy bilaterally greater to the left resulting in severe foraminal encroachment bilaterally greater to the left.      Interval development of moderate intra and extrahepatic biliary dilation, increased pneumobilia and pancreatic ductal dilation. This likely represents malfunction of the distal common bile duct stent. Recommend GI consultation. Gastric and hiatal hernia distention with fluid. There is transition near the duodenal junction with poor definition of the duodenum. This could be reactive with resultant relative gastric outlet obstruction. Cholelithiasis with mild gallbladder distention with fluid, air and mild surrounding pericholecystic edema possible cholecystitis. Gross deformity with severe canal stenosis L2-3 with prior compression fractures and vertebral augmentation. This is unchanged. Scheduled Meds:   HYDROmorphone  0.5 mg Intravenous Once    meropenem  1 g Intravenous Q12H    sodium chloride flush  10 mL Intravenous 2 times per day    sodium chloride flush  10 mL Intravenous 2 times per day    enoxaparin  40 mg Subcutaneous Daily    levothyroxine  100 mcg Oral Daily    pantoprazole  40 mg Intravenous Daily     Continuous Infusions:   sodium chloride 125 mL/hr at 10/18/20 0008     PRN Meds:.ioversol, fentanNYL, ondansetron, prochlorperazine, labetalol, hydrALAZINE, sodium chloride flush, sodium chloride flush, acetaminophen **OR** acetaminophen, polyethylene glycol, promethazine **OR** ondansetron, potassium chloride **OR** potassium alternative oral replacement **OR** potassium chloride, magnesium sulfate      Assessment:  80 y.o. female admitted with   1. Biliary stent obstruction, initial encounter    2. Malignant neoplasm of pancreas, unspecified location of malignancy ([facility] [address]    3. Pneumobilia        Cholangitis, status-post ERCP with stent removal/exchange and periampullary duodenum biopsy on 10/18/2020  Biliary obstruction, status-post stent but dislodged  Nausea and vomiting, possible gastric outlet obstruction  History of pancreatic cancer       Plan:  1. ECRP this morning per GI; no plans for surgical intervention at this time  2. Diet per GI  3.  IV hydration; monitor and correct electrolytes  4. Antibiotics  5. Activity as tolerated  6. PRN analgesics and antiemetics--minimizing narcotics as tolerated  7. Management of medical comorbid etiologies per primary team and consulting services    EDUCATION:  Educated patient on plan of care and disease process--all questions answered. Plans discussed with patient and nursing. Reviewed and discussed with Dr. Shannon Velasquez. Signed:  Arleth RuelasAME - CNP  10/18/2020 10:18 AM    I have personally performed a face to face diagnostic evaluation on this patient. I have interviewed and examined the patient and I agree with the assessment above. In summary, my findings and plan are the following:   Ms. Soumya Coy is a 80 y.o. female who presents with   Procedure date 10/18/20, s/p ERCP with stent replacement and biopsy for acute cholangitis and biliary obstruction from stent dislodgement  History of pancreatic cancer     Plan:  1. Gallbladder is inflamed secondary to distal biliary obstruction and should not be removed. In the event that the common bile duct cannot be stented, a biliary drain could be placed into the gallbladder for decompression. No plans for cholecystectomy this admission or in the future  2. Gastric outlet obstruction on CT not consistent with patient's clinical picture or endoscopy  3. Biopsies pending from endoscopy. She believes that her cancer has been cured by \"the lord\" and is unwilling to discuss the topic any further. Will address this further if necessary, but otherwise will respect her wishes  3. Defer diet to GI, may advance diet as tolerated from surgical standpoint  4. Pain medication and antiemetics as needed with caution as may mask worsening exam  5. Defer management of remainder of medical comorbidities to primary and consulting teams       Cristo Velasquez MD, FACS  10/18/2020  12:28 PM

## 2020-10-18 NOTE — PROGRESS NOTES
Pt admitted to PACU, Tachycardic 105, tachypneic 30,  BP stable, O2 saturations stable on simple mask 6L.  pt awakening and following commands, [place], denies pain - will monitor

## 2020-10-18 NOTE — PROGRESS NOTES
Reviewed patient's medical and surgical history in electronic record and with patient at the bedside. All questions regarding procedure answered. Scope number and equipment verified using a two person system.     Electronically signed by Andrea Curtis RN on 10/18/2020 at 9:11 AM

## 2020-10-18 NOTE — PROGRESS NOTES
[facility]    10/18/2020 7:59 AM        Name: Kaitlin Nicole . Admitted: 10/16/2020  Primary Care Provider: Lieutenant Ramu MD (Tel: [phone])      Chief Complaint   Patient presents with    Abdominal Pain     Pt from home, via [facility], states she took prilosec at 6AM and now her abdomen hurts. States had N/V X4 but nothing since 10 AM        Brief History: Patient is a 81 yo female with hx HTN, SVT, pancreatic cancer, hypothyroidism. She presented to hospital with epigastric/RUQ abdominal pain associated with n/v. Subjective:  Presently resting in bed. Patient with chills overnight, temp max 98.3, WBC bump to 15.0 this am. Remains on Zosyn. Worsening nausea, abdominal pain. NPO for ERCP this morning with stent placement.  BP improved this am.     Reviewed interval ancillary notes    Current Medications  sodium chloride flush 0.9 % injection 10 mL, 2 times per day  sodium chloride flush 0.9 % injection 10 mL, PRN  piperacillin-tazobactam (ZOSYN) 3.375 g in dextrose 5 % 50 mL IVPB (mini-bag), Q8H  sodium chloride flush 0.9 % injection 10 mL, 2 times per day  sodium chloride flush 0.9 % injection 10 mL, PRN  acetaminophen (TYLENOL) tablet 650 mg, Q6H PRN    Or  acetaminophen (TYLENOL) suppository 650 mg, Q6H PRN  polyethylene glycol (GLYCOLAX) packet 17 g, Daily PRN  promethazine (PHENERGAN) tablet 12.5 mg, Q6H PRN    Or  ondansetron (ZOFRAN) injection 4 mg, Q6H PRN  enoxaparin (LOVENOX) injection 40 mg, Daily  potassium chloride (KLOR-CON M) extended release tablet 40 mEq, PRN    Or  potassium bicarb-citric acid (EFFER-K) effervescent tablet 40 mEq, PRN    Or  potassium chloride 10 mEq/100 mL IVPB (Peripheral Line), PRN  magnesium sulfate 2 g in 50 mL IVPB premix, PRN  levothyroxine (SYNTHROID) tablet 100 mcg, Daily  0.9 % sodium chloride infusion, Continuous  pantoprazole (PROTONIX)

## 2020-10-18 NOTE — PROGRESS NOTES
Pt arrived back to 3a from endo, BP 98/60   Pulse 118   Temp 98.2 °F (36.8 °C)   Resp 24   Ht 5' 6\" (1.676 m)   Wt 154 lb 6.4 oz (70 kg)   SpO2 96%   BMI 24.92 kg/m²   Pt drowsy but easily arousable.

## 2020-10-18 NOTE — PROGRESS NOTES
Pt found moaning, shivering, nauseated, and complaining of the inability to warm up. Temp 98 oral, BP elevated, and zofran administered per MAR. Pt resting with eyes closed after zofran. Night shift hospitalist notified.

## 2020-10-18 NOTE — PROGRESS NOTES
[address] [place] [address]  GI  Gastroenterology Progress Note  Acosta Cadet is a 80 y.o. female patient. 1. Biliary stent obstruction, initial encounter    2. Malignant neoplasm of pancreas, unspecified location of malignancy ([address]    3. Pneumobilia        SUBJECTIVE:  Having episodes of chills/rigors despite afebrile. VS have been labile with mild tachycardia and mild low BP. Her IVF rate was increased. Denies abd pain, but occasional nausea without vomiting that responds to Zofran. Physical    VITALS:  /70   Pulse 110   Temp 98 °F (36.7 °C) (Oral)   Resp 20   Ht 5' 6\" (1.676 m)   Wt 154 lb 6.4 oz (70 kg)   SpO2 94%   BMI 24.92 kg/m²   TEMPERATURE:  Current - Temp: 98 °F (36.7 °C); Max - Temp  Av °F (36.7 °C)  Min: 97.6 °F (36.4 °C)  Max: 98.3 °F (36.8 °C)    Abdomen soft, ND, NT, no HSM, Bowel sounds normal     Data      Recent Labs     10/16/20  1247 10/17/20  0749 10/18/20  0535   WBC 11.6* 4.6 15.0*   HGB 13.5 13.1 12.9   HCT 40.9 40.0 39.1   MCV 93.1 94.1 92.7    200 174     Recent Labs     10/16/20  1247 10/17/20  0749 10/18/20  0535    138 140   K 3.7 3.9 3.9    103 107   CO2 24 23 20*   PHOS  --  2.4*  --    BUN 17 20 27*   CREATININE 0.9 0.9 1.0     Recent Labs     10/16/20  1247 10/17/20  0749 10/18/20  0535   AST 82* 91* 92*   ALT 69* 91* 101*   BILIDIR  --  3.6*  --    BILITOT 3.0* 4.0* 3.1*   ALKPHOS 439* 370* 291*     Recent Labs     10/16/20  1247 10/17/20  0749 10/18/20  0535   LIPASE 259.0* 47.0 25.0         ASSESSMENT :  Nausea, vomiting, RUQ pain in pt with history of pancreatic cancer -  Admitted with above symptoms. Abd CT suggests biliary obstruction perhaps from migrated/occluded stent and also concern for GOO at level of duodenum. She has chills, nausea and tachy/low BP over past 12 hrs. WBC worse today, but afebrile.     Pancreatic cancer w/u in 2019 included EUS 2019 showed a 1.2 x 1.4 cm mass in pancreatic head with FNA showing adenocarcinoma.   ERCP with palliative fully-covered expandable metal mesh biliary stent placement on 6/13/2020. Ca19-9 6/12/19 was 63. Has returned to normal per patient. She has done well since and has undergone repeat CT showing no evidence of neoplasm and Ca19-9 normalized. Feels better today. No vomiting since admission and now wants to eat.     Biliary sepsis : likely due to obstructed/migrated biliary stent. Her liver enzymes rising, dilated biliary and pancreatic ducts on CT, (+)blood cultures with GNR and GPC in chains. She has chills, nausea and tachy/low BP over past 12 hrs. WBC worse today, but afebrile. Made NPO last pm.           PLAN  :  1) NPO  2) Will change Zosyn to Invanz. 3) Plan ERCP more urgently this AM with possible bilary stent exchange/removal and possibly duodenal stenting if duodenal obstruction encountered. Discussed with patient, family and RN staff.     Bruce Vargas MD  [address]  10/18/2020

## 2020-10-19 ENCOUNTER — APPOINTMENT (OUTPATIENT)
Dept: GENERAL RADIOLOGY | Age: 85
DRG: 919 | End: 2020-10-19
Payer: MEDICARE

## 2020-10-19 LAB
A/G RATIO: 0.8 (ref 1.1–2.2)
ALBUMIN SERPL-MCNC: 2.4 G/DL (ref 3.4–5)
ALP BLD-CCNC: 274 U/L (ref 40–129)
ALT SERPL-CCNC: 79 U/L (ref 10–40)
ANION GAP SERPL CALCULATED.3IONS-SCNC: 8 MMOL/L (ref 3–16)
AST SERPL-CCNC: 46 U/L (ref 15–37)
BASOPHILS ABSOLUTE: 0 K/UL (ref 0–0.2)
BASOPHILS RELATIVE PERCENT: 0.1 %
BILIRUB SERPL-MCNC: 2.1 MG/DL (ref 0–1)
BUN BLDV-MCNC: 34 MG/DL (ref 7–20)
CALCIUM SERPL-MCNC: 7.9 MG/DL (ref 8.3–10.6)
CHLORIDE BLD-SCNC: 107 MMOL/L (ref 99–110)
CO2: 23 MMOL/L (ref 21–32)
CREAT SERPL-MCNC: 1 MG/DL (ref 0.6–1.2)
EOSINOPHILS ABSOLUTE: 0.1 K/UL (ref 0–0.6)
EOSINOPHILS RELATIVE PERCENT: 0.9 %
GFR AFRICAN AMERICAN: >60
GFR NON-AFRICAN AMERICAN: 52
GLOBULIN: 3.1 G/DL
GLUCOSE BLD-MCNC: 118 MG/DL (ref 70–99)
HCT VFR BLD CALC: 38.3 % (ref 36–48)
HEMOGLOBIN: 12.6 G/DL (ref 12–16)
LIPASE: 264 U/L (ref 13–60)
LYMPHOCYTES ABSOLUTE: 1.1 K/UL (ref 1–5.1)
LYMPHOCYTES RELATIVE PERCENT: 8.6 %
MCH RBC QN AUTO: 30.8 PG (ref 26–34)
MCHC RBC AUTO-ENTMCNC: 33 G/DL (ref 31–36)
MCV RBC AUTO: 93.4 FL (ref 80–100)
MONOCYTES ABSOLUTE: 0.6 K/UL (ref 0–1.3)
MONOCYTES RELATIVE PERCENT: 5.3 %
NEUTROPHILS ABSOLUTE: 10.5 K/UL (ref 1.7–7.7)
NEUTROPHILS RELATIVE PERCENT: 85.1 %
PDW BLD-RTO: 13.3 % (ref 12.4–15.4)
PLATELET # BLD: 164 K/UL (ref 135–450)
PMV BLD AUTO: 10.3 FL (ref 5–10.5)
POTASSIUM SERPL-SCNC: 4.4 MMOL/L (ref 3.5–5.1)
PRO-BNP: ABNORMAL PG/ML (ref 0–449)
RBC # BLD: 4.1 M/UL (ref 4–5.2)
SODIUM BLD-SCNC: 138 MMOL/L (ref 136–145)
TOTAL PROTEIN: 5.5 G/DL (ref 6.4–8.2)
WBC # BLD: 12.3 K/UL (ref 4–11)

## 2020-10-19 PROCEDURE — 6360000002 HC RX W HCPCS: Performed by: INTERNAL MEDICINE

## 2020-10-19 PROCEDURE — 36415 COLL VENOUS BLD VENIPUNCTURE: CPT

## 2020-10-19 PROCEDURE — 85025 COMPLETE CBC W/AUTO DIFF WBC: CPT

## 2020-10-19 PROCEDURE — C9113 INJ PANTOPRAZOLE SODIUM, VIA: HCPCS | Performed by: INTERNAL MEDICINE

## 2020-10-19 PROCEDURE — 99232 SBSQ HOSP IP/OBS MODERATE 35: CPT | Performed by: SURGERY

## 2020-10-19 PROCEDURE — APPSS15 APP SPLIT SHARED TIME 0-15 MINUTES: Performed by: NURSE PRACTITIONER

## 2020-10-19 PROCEDURE — 83880 ASSAY OF NATRIURETIC PEPTIDE: CPT

## 2020-10-19 PROCEDURE — 6360000002 HC RX W HCPCS: Performed by: NURSE PRACTITIONER

## 2020-10-19 PROCEDURE — 1200000000 HC SEMI PRIVATE

## 2020-10-19 PROCEDURE — 80053 COMPREHEN METABOLIC PANEL: CPT

## 2020-10-19 PROCEDURE — 2580000003 HC RX 258: Performed by: INTERNAL MEDICINE

## 2020-10-19 PROCEDURE — APPNB30 APP NON BILLABLE TIME 0-30 MINS: Performed by: NURSE PRACTITIONER

## 2020-10-19 PROCEDURE — 83690 ASSAY OF LIPASE: CPT

## 2020-10-19 PROCEDURE — 6370000000 HC RX 637 (ALT 250 FOR IP): Performed by: INTERNAL MEDICINE

## 2020-10-19 PROCEDURE — 6370000000 HC RX 637 (ALT 250 FOR IP): Performed by: NURSE PRACTITIONER

## 2020-10-19 PROCEDURE — 71045 X-RAY EXAM CHEST 1 VIEW: CPT

## 2020-10-19 RX ORDER — FUROSEMIDE 10 MG/ML
20 INJECTION INTRAMUSCULAR; INTRAVENOUS ONCE
Status: COMPLETED | OUTPATIENT
Start: 2020-10-19 | End: 2020-10-19

## 2020-10-19 RX ADMIN — ONDANSETRON 4 MG: 2 INJECTION INTRAMUSCULAR; INTRAVENOUS at 11:42

## 2020-10-19 RX ADMIN — MEROPENEM 1 G: 1 INJECTION, POWDER, FOR SOLUTION INTRAVENOUS at 20:54

## 2020-10-19 RX ADMIN — METOPROLOL TARTRATE 12.5 MG: 25 TABLET, FILM COATED ORAL at 20:55

## 2020-10-19 RX ADMIN — FUROSEMIDE 20 MG: 10 INJECTION, SOLUTION INTRAMUSCULAR; INTRAVENOUS at 19:23

## 2020-10-19 RX ADMIN — ENOXAPARIN SODIUM 40 MG: 40 INJECTION SUBCUTANEOUS at 09:13

## 2020-10-19 RX ADMIN — Medication 10 ML: at 21:05

## 2020-10-19 RX ADMIN — SODIUM CHLORIDE: 9 INJECTION, SOLUTION INTRAVENOUS at 07:41

## 2020-10-19 RX ADMIN — METOPROLOL TARTRATE 12.5 MG: 25 TABLET, FILM COATED ORAL at 09:13

## 2020-10-19 RX ADMIN — PANTOPRAZOLE SODIUM 40 MG: 40 INJECTION, POWDER, FOR SOLUTION INTRAVENOUS at 09:13

## 2020-10-19 RX ADMIN — MEROPENEM 1 G: 1 INJECTION, POWDER, FOR SOLUTION INTRAVENOUS at 09:13

## 2020-10-19 RX ADMIN — LEVOTHYROXINE SODIUM 100 MCG: 0.1 TABLET ORAL at 06:20

## 2020-10-19 RX ADMIN — ACETAMINOPHEN 650 MG: 325 TABLET ORAL at 11:42

## 2020-10-19 ASSESSMENT — PAIN SCALES - GENERAL
PAINLEVEL_OUTOF10: 0
PAINLEVEL_OUTOF10: 7
PAINLEVEL_OUTOF10: 0
PAINLEVEL_OUTOF10: 2

## 2020-10-19 ASSESSMENT — PAIN DESCRIPTION - PAIN TYPE: TYPE: CHRONIC PAIN

## 2020-10-19 ASSESSMENT — PAIN DESCRIPTION - LOCATION: LOCATION: LEG

## 2020-10-19 NOTE — PLAN OF CARE
Problem: Falls - Risk of:  Goal: Will remain free from falls  Description: Will remain free from falls  10/19/2020 1457 by Nikita Colin RN  Outcome: Ongoing  Note: High fall risk per Bauer scale. Fall precautions in place, bed alarm engaged. Non-skid footwear on patient, belongings within reach, bed in lowest position.         Problem: Falls - Risk of:  Goal: Absence of physical injury  Description: Absence of physical injury  Outcome: Ongoing

## 2020-10-19 NOTE — PROGRESS NOTES
[facility]    10/19/2020 8:56 AM        Name: Diamond Maloney . Admitted: 10/16/2020  Primary Care Provider: Srini Christopher MD (Tel: [phone])      Chief Complaint   Patient presents with    Abdominal Pain     Pt from home, via [facility], states she took prilosec at 6AM and now her abdomen hurts. States had N/V X4 but nothing since 10 AM        Brief History: Patient is a 81 yo female with hx HTN, SVT, pancreatic cancer, hypothyroidism. She presented to hospital with epigastric/RUQ abdominal pain associated with n/v.     Procedure(s) 10/18/2020 by Dr Octavio Pitts:  ERCP STENT REMOVAL/EXCHANGE  ERCP BIOPSY    Subjective:  Presently resting in bed. Says she feels much better post procedure yesterday. Reports some wheezing, mild shortness of breath this morning. Denies chills, chest pain, abdominal pain, nausea. Tolerating clear liquid diet.      Reviewed interval ancillary notes    Current Medications  sodium chloride flush 0.9 % injection 10 mL, 2 times per day  HYDROmorphone HCl PF (DILAUDID) injection 0.5 mg, Once  meropenem (MERREM) 1 g in sodium chloride 0.9 % 100 mL IVPB (mini-bag), Q12H  lactated ringers infusion, Continuous  metoprolol tartrate (LOPRESSOR) tablet 12.5 mg, BID  acetaminophen (TYLENOL) tablet 650 mg, Q6H PRN    Or  acetaminophen (TYLENOL) suppository 650 mg, Q6H PRN  polyethylene glycol (GLYCOLAX) packet 17 g, Daily PRN  promethazine (PHENERGAN) tablet 12.5 mg, Q6H PRN    Or  ondansetron (ZOFRAN) injection 4 mg, Q6H PRN  enoxaparin (LOVENOX) injection 40 mg, Daily  potassium chloride (KLOR-CON M) extended release tablet 40 mEq, PRN    Or  potassium bicarb-citric acid (EFFER-K) effervescent tablet 40 mEq, PRN    Or  potassium chloride 10 mEq/100 mL IVPB (Peripheral Line), PRN  magnesium sulfate 2 g in 50 mL IVPB premix, PRN  levothyroxine (SYNTHROID) tablet 100 mcg, Daily  0.9 % sodium chloride infusion, Continuous  pantoprazole (PROTONIX) injection 40 mg, Daily      Objective:  /81   Pulse 81   Temp 98 °F (36.7 °C) (Oral)   Resp 19   Ht 5' 6\" (1.676 m)   Wt 164 lb 3.2 oz (74.5 kg)   SpO2 96%   BMI 26.50 kg/m²     Intake/Output Summary (Last 24 hours) at 10/19/2020 0856  Last data filed at 10/18/2020 1515  Gross per 24 hour   Intake 550 ml   Output 200 ml   Net 350 ml      Wt Readings from Last 3 Encounters:   10/19/20 164 lb 3.2 oz (74.5 kg)   06/09/19 145 lb (65.8 kg)   12/10/18 145 lb (65.8 kg)     General:  Awake, alert, oriented in NAD  Skin:  Warm and dry. No unusual bruising or rash  Neck:  Supple. No JVD appreciated  Chest:  Normal effort. Clear to auscultation, no wheezes/rhonchi/rales, audible wheezes in upper airway  Cardiovascular:  RRR, normal S1/S2, no murmur/gallop/rub  Abdomen:  Soft, nontender, +bowel sounds  Extremities:  No edema  Neurological: No focal deficits  Psychological: Normal mood and affect    Labs and Tests:  CBC:   Recent Labs     10/17/20  0749 10/18/20  0535 10/19/20  0527   WBC 4.6 15.0* 12.3*   HGB 13.1 12.9 12.6    174 164     BMP:    Recent Labs     10/17/20  0749 10/18/20  0535 10/19/20  0527    140 138   K 3.9 3.9 4.4    107 107   CO2 23 20* 23   BUN 20 27* 34*   CREATININE 0.9 1.0 1.0   GLUCOSE 103* 119* 118*     Hepatic:   Recent Labs     10/17/20  0749 10/18/20  0535 10/19/20  0527   AST 91* 92* 46*   ALT 91* 101* 79*   BILITOT 4.0* 3.1* 2.1*   ALKPHOS 370* 291* 274*       CT Abdomen/Pelvis 10/16/2020:   Interval development of moderate intra and extrahepatic biliary dilation,    increased pneumobilia and pancreatic ductal dilation.  This likely represents    malfunction of the distal common bile duct stent.  Recommend GI consultation.         Gastric and hiatal hernia distention with fluid.  There is transition near    the duodenal junction with poor definition of the duodenum.  This could be reactive with resultant relative gastric outlet obstruction.         Cholelithiasis with mild gallbladder distention with fluid, air and mild    surrounding pericholecystic edema possible cholecystitis.         Gross deformity with severe canal stenosis L2-3 with prior compression    fractures and vertebral augmentation.  This is unchanged.             Problem List  Active Problems:    Pancreatic cancer (HCC)    Biliary stent obstruction, initial encounter  Resolved Problems:    * No resolved hospital problems. *       Assessment & Plan:   1. Abdominal pain. Hx pancreatic cancer (dx 6/2019). S/p ERCP with biliary stent placement 6/2019, no follow up or treatment since. CT scan this admission concerning for malfunction of stent, concern for gastric outlet obstruction as well. ERCP with stent placement yesterday (10/18) with resolution of abdominal pain. Live enzymes trending down. GI and surgery on case. 2. Bacteremia. Blood cultures + for Klebsiella and Streptococcus. Believed source is biliary sepsis secondary to obstructed/migrated biliary stent. Initially started on Zosyn, transitioned to meropenem yesterday, WBC trending down,  11.6->4.6->15.0->12.3, Temp max 98.3. ID on case. 3. Dyspnea. Patient offers c/o mild dyspnea and wheezing this morning. Lungs clear on exam. O2 sats stable. Check CXR. 4. Hypotension. Resolved. Continue to hold diltiazem secondary soft BP. Remains in sinus rhythm. 5. Hypothyroidism. Continue levothyroxine. TSH 1.55.   6. Deconditioning. Consult PT/OT for DC planning. Patient herself expressing desire for \"rehab\" but adamant that she does not want to go to nursing home. Says she had bad experience in one in past. Consult case management for  DC planning. Disposition: Unclear at this point. Patient lives alone, I suspect she will need short stay SNF if agreeable. PT/OT, case management consults pending. Diet: DIET CLEAR LIQUID;  Code:Full Code  DVT PPX: enoxaparin. Ashley Siu, APRN - CNP   10/19/2020 8:56 AM       Addendum 10/19 8:30 pm. CXR suggestive pulmonary edema. Patient with c/o wheezing and exertional dyspnea. BNP level > 48457. Gave Lasix 20 mg IV x 1.  BMP in am.  Selinda Opitz, APRN-CNP

## 2020-10-19 NOTE — PROGRESS NOTES
[address] [facility]        Progress Note    Patient Name: Diamond Maloney  MRN: [id number]  YOB: 1928  Date of Evaluation: 10/19/2020    Chief Complaint: Nausea and vomiting    Subjective:  No acute events overnight  Continues to deny abdominal pain but new leg pain  Continued nausea and dry heaves  Resting in bed at this time      Vital Signs:  Patient Vitals for the past 24 hrs:   BP Temp Temp src Pulse Resp SpO2 Weight   10/19/20 0900 129/81 98.1 °F (36.7 °C) Oral 102 20 96 % --   10/19/20 0426 131/81 98 °F (36.7 °C) Oral 81 19 96 % 164 lb 3.2 oz (74.5 kg)   10/19/20 0012 112/69 98 °F (36.7 °C) Oral 78 -- 96 % --   10/18/20 2100 -- -- -- 111 -- -- --   10/18/20 2010 111/70 97.6 °F (36.4 °C) Oral 96 19 96 % --   10/18/20 1615 103/68 -- -- 114 -- 95 % --   10/18/20 1515 109/74 97.3 °F (36.3 °C) -- 117 20 96 % --      TEMPERATURE HISTORY 24H: Temp (24hrs), Av.8 °F (36.6 °C), Min:97.3 °F (36.3 °C), Max:98.1 °F (36.7 °C)    BLOOD PRESSURE HISTORY: Systolic (39NEG), CMC:712 , Min:72 , OAX:506    Diastolic (12LKO), NXX:09, Min:46, Max:89      Intake/Output:  I/O last 3 completed shifts: In: 550 [I.V.:550]  Out: 200 [Urine:200]  I/O this shift:   In: 480 [P.O.:480]  Out: -   Drain/tube Output:       Physical Exam:  General: awake, alert, oriented to person, place, time  Cardiovascular:  regular rate and rhythm and no murmur noted  Lungs: clear to auscultation  Abdomen: soft, nontender, nondistended, bowel sounds normal   Skin: Juandice     Labs:  CBC:    Recent Labs     10/17/20  0749 10/18/20  0535 10/19/20  05   WBC 4.6 15.0* 12.3*   HGB 13.1 12.9 12.6   HCT 40.0 39.1 38.3    174 164     BMP:    Recent Labs     10/17/20  0749 10/18/20  0535 10/19/20  05    140 138   K 3.9 3.9 4.4    107 107   CO2 23 20* 23   BUN 20 27* 34*   CREATININE 0.9 1.0 1.0   GLUCOSE 103* 119* 118*     Hepatic:    Recent Labs     10/17/20  0749 10/18/20  0535 10/19/20  0527   AST 91* 92* 46*   ALT 91* 101* 79*   BILITOT 4.0* 3.1* 2.1*   ALKPHOS 370* 291* 274*     Amylase:  No results found for: AMYLASE  Lipase:    Lab Results   Component Value Date    LIPASE 264.0 10/19/2020    LIPASE 25.0 10/18/2020    LIPASE 47.0 10/17/2020      Mag:    Lab Results   Component Value Date    MG 1.90 10/17/2020     Phos:     Lab Results   Component Value Date    PHOS 2.4 10/17/2020    PHOS 2.8 06/08/2019      Coags:   Lab Results   Component Value Date    PROTIME 15.7 10/17/2020    INR 1.35 10/17/2020    APTT 31.9 10/17/2020       Cultures:  Anaerobic culture  No results found for: LABANAE  Fungus stain  No results found for requested labs within last 30 days. Gram stain  No results found for requested labs within last 30 days. Organism  Lab Results   Component Value Date/Time    ORG Klebsiella pneumoniae DNA Detected (A) 10/16/2020 01:49 PM    ORG Streptococcus species DNA Detected (A) 10/16/2020 01:49 PM    ORG Streptococcus anginosus group (A) 10/16/2020 01:49 PM    ORG Klebsiella pneumoniae (A) 10/16/2020 01:49 PM     Surgical culture  No results found for: CXSURG  Blood culture 1  Results in Past 30 Days  Result Component Current Result Ref Range Previous Result Ref Range   Blood Culture, Routine Gram stain Aerobic bottle:  Gram negative rods  Information to follow  Gram positive cocci in chains and/or pairs  resembling Streptococcus  Information to follow  there are other sets of blood cultures on this patient  that have at least one positive bottle. @Information to follow  Gram stain Anaerobic bottle:  Gram positive cocci in chains and/or pairs  resembling Streptococcus  Information to follow   (A) (10/16/2020)  Not in Time Range     POSITIVE for  No further workup   Isolated two of two sets  Refer to (culture [id number]) for sensitivity results   (10/16/2020)       POSITIVE for  No further workup   Isolated two of two sets  Refer to (culture [id number]) for sensitivity results   (10/16/2020)        Blood culture 2  Results in Past 30 Days  Result Component Current Result Ref Range Previous Result Ref Range   Culture, Blood 2 Gram stain Aerobic bottle:  Gram negative rods  Information to follow  Gram positive cocci in chains and/or pairs  resembling Streptococcus  Information to follow  Gram stain Anaerobic bottle:  Gram positive cocci in chains and/or pairs  resembling Streptococcus  Information to follow   (A) (10/16/2020)  Not in Time Range     See additional report for complete BCID panel. (10/16/2020)       See additional report for complete BCID panel. (10/16/2020)       POSITIVE for  Sensitivity to follow   Isolated two of two sets   (10/16/2020)       POSITIVE for   Isolated two of two sets   (10/16/2020)        Fecal occult  No results found for requested labs within last 30 days. GI bacterial pathogens by PCR  No results found for requested labs within last 30 days. C. difficile  No results found for requested labs within last 30 days. Urine culture  Lab Results   Component Value Date    LABURIN  06/08/2019     <50,000 CFU/ml mixed skin/urogenital nury. No further workup       Pathology:  Pathology results pending     Imaging:  I have personally reviewed the following films:    Fl Ercp Biliary And Pancreatic S&i    Result Date: 10/18/2020  EXAMINATION: 7 SPOT IMAGES FROM AN ERCP COMPARISON: None FLUOROSCOPY DOSE OR TIME/IMAGES: 3 minutes 48 seconds. 7 fluoroscopic images. HISTORY: ORDERING SYSTEM PROVIDED HISTORY: Pain TECHNOLOGIST PROVIDED HISTORY: Reason for exam:->Pain Reason for Exam: ERCP Acuity: Unknown Type of Exam: Unknown FINDINGS: Endoscopy and cannulation of the major papilla was performed by the gastroenterology service under the supervision of [address] [place] [address]. Spot views are presented for interpretation. Wire biliary stent in place on the initial image. Stent subsequently removed.   Injection of a small amount of contrast opacifies the dilated mid common duct. Catheter biliary stent than placed. Drainage of intrahepatic biliary contrast occurs. Prior lumbar kyphoplasty at 2 levels. Unremarkable ERCP images. Please refer to the procedure report for further details. Xr Chest Portable    Result Date: 10/19/2020  EXAMINATION: ONE XRAY VIEW OF THE CHEST 10/19/2020 7:18 am COMPARISON: 10/21/2013 HISTORY: ORDERING SYSTEM PROVIDED HISTORY: wheezing TECHNOLOGIST PROVIDED HISTORY: Reason for exam:->wheezing Reason for exam:->dyspnea Reason for Exam: wheezing; dyspnea Acuity: Acute Type of Exam: Initial FINDINGS: Patchy opacities in the right lower lobe and to a lesser degree peripheral basilar left lower lobe. Blunting of the bilateral costophrenic sulci. No pneumothorax. Cardiomegaly. Age related degenerative changes of the visualized osseous structures with severe degenerative changes of the right glenohumeral joint with numerous intra-articular bodies. Right greater than left bibasilar pulmonary opacities with possible trace bilateral effusions. In the setting of cardiomegaly this may be on the basis of pulmonary edema/positive fluid balance, though multilobar infection with parapneumonic effusions is a consideration in the appropriate clinical setting.      Scheduled Meds:   sodium chloride flush  10 mL Intravenous 2 times per day    HYDROmorphone  0.5 mg Intravenous Once    meropenem  1 g Intravenous Q12H    metoprolol tartrate  12.5 mg Oral BID    enoxaparin  40 mg Subcutaneous Daily    levothyroxine  100 mcg Oral Daily    pantoprazole  40 mg Intravenous Daily     Continuous Infusions:   lactated ringers 125 mL/hr at 10/18/20 2259    sodium chloride 250 mL/hr at 10/19/20 0741     PRN Meds:.acetaminophen **OR** acetaminophen, polyethylene glycol, promethazine **OR** ondansetron, potassium chloride **OR** potassium alternative oral replacement **OR** potassium chloride, magnesium sulfate      Assessment:  80 y.o. female admitted with 1. Biliary stent obstruction, initial encounter    2. Malignant neoplasm of pancreas, unspecified location of malignancy ([facility]    3. Pneumobilia        Cholangitis, status-post ERCP with stent removal/exchange and periampullary duodenum biopsy on 10/18/2020  Biliary obstruction, status-post stent but dislodged  Nausea and vomiting, possible gastric outlet obstruction  History of pancreatic cancer       Plan:  1. No acute surgical issues  2. Diet per GI  3. IV hydration; monitor and correct electrolytes  4. Antibiotics  5. Activity as tolerated  6. PRN analgesics and antiemetics--minimizing narcotics as tolerated  7. Management of medical comorbid etiologies per primary team and consulting services    EDUCATION:  Educated patient on plan of care and disease process--all questions answered. Lissy Morrow is stable from surgical standpoint. Will follow as needed. Please call with questions or concerns. Thank you for allowing us to participate in [facility] care. Plans discussed with patient and nursing. Reviewed and discussed with Dr. Peggy Arizmendi. Signed:  AME Carr - CNP  10/19/2020 12:04 PM     I have personally performed a face to face diagnostic evaluation on this patient. I have interviewed and examined the patient and I agree with the assessment above. In summary, my findings and plan are the following:   Ms. Carisa Casas is a 80 y.o. female who presents with   Procedure date 10/18/20, s/p ERCP with stent replacement and biopsy for acute cholangitis and biliary obstruction from stent dislodgement  History of pancreatic cancer     Plan:  1. Gallbladder is inflamed secondary to distal biliary obstruction and should not be removed. In the event that the common bile duct stent becomes dislodged again and cannot be stented, a biliary drain could be placed into the gallbladder for decompression.  No plans for cholecystectomy this admission or in the future  2. Gastric outlet obstruction

## 2020-10-19 NOTE — PROGRESS NOTES
[facility] GI  Gastroenterology Progress Note  Diamond Maloney is a 80 y.o. female patient. 1. Biliary stent obstruction, initial encounter    2. Malignant neoplasm of pancreas, unspecified location of malignancy ([address]    3. Pneumobilia        SUBJECTIVE:  Feels well. No ab pain, n/v.      Physical    VITALS:  /81   Pulse 81   Temp 98 °F (36.7 °C) (Oral)   Resp 19   Ht 5' 6\" (1.676 m)   Wt 164 lb 3.2 oz (74.5 kg)   SpO2 96%   BMI 26.50 kg/m²   TEMPERATURE:  Current - Temp: 98 °F (36.7 °C); Max - Temp  Av.8 °F (36.6 °C)  Min: 97.3 °F (36.3 °C)  Max: 98.3 °F (36.8 °C)    Abdomen soft, ND, NT, no HSM, Bowel sounds normal   nad  Cta bilat nl effort    Data      Recent Labs     10/17/20  0749 10/18/20  0535 10/19/20  0527   WBC 4.6 15.0* 12.3*   HGB 13.1 12.9 12.6   HCT 40.0 39.1 38.3   MCV 94.1 92.7 93.4    174 164     Recent Labs     10/17/20  0749 10/18/20  0535 10/19/20  05    140 138   K 3.9 3.9 4.4    107 107   CO2 23 20* 23   PHOS 2.4*  --   --    BUN 20 27* 34*   CREATININE 0.9 1.0 1.0     Recent Labs     10/17/20  0749 10/18/20  0535 10/19/20  05   AST 91* 92* 46*   ALT 91* 101* 79*   BILIDIR 3.6*  --   --    BILITOT 4.0* 3.1* 2.1*   ALKPHOS 370* 291* 274*     Recent Labs     10/17/20  0749 10/18/20  0535 10/19/20  05   LIPASE 47.0 25.0 264.0*             ASSESSMENT   1. Cholangitis- s/p removal of metal stent and placement of plastic pigtail stents for biliary decompression. WBC improved and bili improving. Now afeb. 2. Pancreatic cancer-   3. Bacteremia- as noted in ID note        PLAN    1. Cont abx. This can be continued as outpt   2. [id number] [address] for [facility]. 3. F/u outpt ERCP in 6-8 wk with Dr. Benites Narcisa.        Jesus Chiu MD  [address]

## 2020-10-19 NOTE — CARE COORDINATION
Discharge Planning Assessment    RN/SW discharge planner met with patient/ (and family member) to discuss reason for admission, current living situation, and potential needs at the time of discharge    Demographics/Insurance verified:   Yes    Current type of dwelling:    house    Patient from ECF/SW confirmed with:    N/A    Living arrangements:   Alone    Level of function/Support:  Independent - was still driving last week    PCP:   Dr. Kyle Castro    DME:   None    Active with any community resources/agencies/skilled home care:   None    Medication compliance issues:    None    Financial issues that could impact healthcare:   None    Tentative discharge plan:   States she wants to do rehab BUT NO SNF - Her son in law is a physician and told her to ask about acute rehab. PT/OT evaluations pending at this time. Discussed with patient and/or family that on the day of discharge home tentative time of discharge will be between 10 AM and noon. Transportation at the time of discharge:   TBD    Currently receving LR @ [address] [place] [address] One having nausea    Case management will follow progress and assist with discharge planning as needed.     Rosaleen Mcburney RN BSN  Case Management  [phone]

## 2020-10-20 ENCOUNTER — APPOINTMENT (OUTPATIENT)
Dept: CT IMAGING | Age: 85
DRG: 919 | End: 2020-10-20
Payer: MEDICARE

## 2020-10-20 LAB
A/G RATIO: 0.8 (ref 1.1–2.2)
ALBUMIN SERPL-MCNC: 2.5 G/DL (ref 3.4–5)
ALP BLD-CCNC: 331 U/L (ref 40–129)
ALT SERPL-CCNC: 55 U/L (ref 10–40)
ANION GAP SERPL CALCULATED.3IONS-SCNC: 10 MMOL/L (ref 3–16)
AST SERPL-CCNC: 44 U/L (ref 15–37)
BASOPHILS ABSOLUTE: 0 K/UL (ref 0–0.2)
BASOPHILS RELATIVE PERCENT: 0.2 %
BILIRUB SERPL-MCNC: 1.6 MG/DL (ref 0–1)
BLOOD CULTURE, ROUTINE: ABNORMAL
BUN BLDV-MCNC: 22 MG/DL (ref 7–20)
CALCIUM SERPL-MCNC: 8.1 MG/DL (ref 8.3–10.6)
CHLORIDE BLD-SCNC: 103 MMOL/L (ref 99–110)
CO2: 26 MMOL/L (ref 21–32)
CREAT SERPL-MCNC: 0.8 MG/DL (ref 0.6–1.2)
CULTURE, BLOOD 2: ABNORMAL
EOSINOPHILS ABSOLUTE: 0 K/UL (ref 0–0.6)
EOSINOPHILS RELATIVE PERCENT: 0.3 %
GFR AFRICAN AMERICAN: >60
GFR NON-AFRICAN AMERICAN: >60
GLOBULIN: 3.3 G/DL
GLUCOSE BLD-MCNC: 189 MG/DL (ref 70–99)
HCT VFR BLD CALC: 39.4 % (ref 36–48)
HEMOGLOBIN: 13 G/DL (ref 12–16)
LIPASE: 900 U/L (ref 13–60)
LYMPHOCYTES ABSOLUTE: 0.9 K/UL (ref 1–5.1)
LYMPHOCYTES RELATIVE PERCENT: 10.6 %
MCH RBC QN AUTO: 30.3 PG (ref 26–34)
MCHC RBC AUTO-ENTMCNC: 33 G/DL (ref 31–36)
MCV RBC AUTO: 92 FL (ref 80–100)
MONOCYTES ABSOLUTE: 0.8 K/UL (ref 0–1.3)
MONOCYTES RELATIVE PERCENT: 9.3 %
NEUTROPHILS ABSOLUTE: 6.6 K/UL (ref 1.7–7.7)
NEUTROPHILS RELATIVE PERCENT: 79.6 %
ORGANISM: ABNORMAL
PDW BLD-RTO: 13.3 % (ref 12.4–15.4)
PLATELET # BLD: 149 K/UL (ref 135–450)
PMV BLD AUTO: 10.7 FL (ref 5–10.5)
POTASSIUM SERPL-SCNC: 3.6 MMOL/L (ref 3.5–5.1)
RBC # BLD: 4.28 M/UL (ref 4–5.2)
SODIUM BLD-SCNC: 139 MMOL/L (ref 136–145)
TOTAL PROTEIN: 5.8 G/DL (ref 6.4–8.2)
WBC # BLD: 8.2 K/UL (ref 4–11)

## 2020-10-20 PROCEDURE — 36415 COLL VENOUS BLD VENIPUNCTURE: CPT

## 2020-10-20 PROCEDURE — 6360000004 HC RX CONTRAST MEDICATION

## 2020-10-20 PROCEDURE — 83690 ASSAY OF LIPASE: CPT

## 2020-10-20 PROCEDURE — C9113 INJ PANTOPRAZOLE SODIUM, VIA: HCPCS | Performed by: INTERNAL MEDICINE

## 2020-10-20 PROCEDURE — 87040 BLOOD CULTURE FOR BACTERIA: CPT

## 2020-10-20 PROCEDURE — 97165 OT EVAL LOW COMPLEX 30 MIN: CPT

## 2020-10-20 PROCEDURE — 1200000000 HC SEMI PRIVATE

## 2020-10-20 PROCEDURE — 97162 PT EVAL MOD COMPLEX 30 MIN: CPT

## 2020-10-20 PROCEDURE — 6360000002 HC RX W HCPCS: Performed by: INTERNAL MEDICINE

## 2020-10-20 PROCEDURE — 97530 THERAPEUTIC ACTIVITIES: CPT

## 2020-10-20 PROCEDURE — 99233 SBSQ HOSP IP/OBS HIGH 50: CPT | Performed by: INTERNAL MEDICINE

## 2020-10-20 PROCEDURE — 74177 CT ABD & PELVIS W/CONTRAST: CPT

## 2020-10-20 PROCEDURE — 6370000000 HC RX 637 (ALT 250 FOR IP): Performed by: INTERNAL MEDICINE

## 2020-10-20 PROCEDURE — 6360000004 HC RX CONTRAST MEDICATION: Performed by: PHYSICIAN ASSISTANT

## 2020-10-20 PROCEDURE — 85025 COMPLETE CBC W/AUTO DIFF WBC: CPT

## 2020-10-20 PROCEDURE — 80053 COMPREHEN METABOLIC PANEL: CPT

## 2020-10-20 PROCEDURE — 6370000000 HC RX 637 (ALT 250 FOR IP): Performed by: NURSE PRACTITIONER

## 2020-10-20 PROCEDURE — 2580000003 HC RX 258: Performed by: INTERNAL MEDICINE

## 2020-10-20 RX ADMIN — Medication 10 ML: at 09:49

## 2020-10-20 RX ADMIN — PANTOPRAZOLE SODIUM 40 MG: 40 INJECTION, POWDER, FOR SOLUTION INTRAVENOUS at 09:49

## 2020-10-20 RX ADMIN — ONDANSETRON 4 MG: 2 INJECTION INTRAMUSCULAR; INTRAVENOUS at 11:02

## 2020-10-20 RX ADMIN — PROMETHAZINE HYDROCHLORIDE 12.5 MG: 25 TABLET ORAL at 15:58

## 2020-10-20 RX ADMIN — LEVOTHYROXINE SODIUM 100 MCG: 0.1 TABLET ORAL at 06:05

## 2020-10-20 RX ADMIN — MEROPENEM 1 G: 1 INJECTION, POWDER, FOR SOLUTION INTRAVENOUS at 21:38

## 2020-10-20 RX ADMIN — MEROPENEM 1 G: 1 INJECTION, POWDER, FOR SOLUTION INTRAVENOUS at 09:48

## 2020-10-20 RX ADMIN — IOPAMIDOL 75 ML: 755 INJECTION, SOLUTION INTRAVENOUS at 21:12

## 2020-10-20 RX ADMIN — METOPROLOL TARTRATE 12.5 MG: 25 TABLET, FILM COATED ORAL at 09:48

## 2020-10-20 RX ADMIN — ACETAMINOPHEN 650 MG: 325 TABLET ORAL at 13:52

## 2020-10-20 RX ADMIN — ACETAMINOPHEN 650 MG: 325 TABLET ORAL at 06:13

## 2020-10-20 RX ADMIN — IOHEXOL 50 ML: 240 INJECTION, SOLUTION INTRATHECAL; INTRAVASCULAR; INTRAVENOUS; ORAL at 18:41

## 2020-10-20 RX ADMIN — Medication 10 ML: at 21:41

## 2020-10-20 RX ADMIN — DILTIAZEM HYDROCHLORIDE 30 MG: 30 TABLET, FILM COATED ORAL at 21:42

## 2020-10-20 RX ADMIN — ENOXAPARIN SODIUM 40 MG: 40 INJECTION SUBCUTANEOUS at 09:48

## 2020-10-20 RX ADMIN — METOPROLOL TARTRATE 12.5 MG: 25 TABLET, FILM COATED ORAL at 21:40

## 2020-10-20 ASSESSMENT — ENCOUNTER SYMPTOMS
COUGH: 0
BLOOD IN STOOL: 0
COLOR CHANGE: 0
NAUSEA: 0
EYE REDNESS: 0
RHINORRHEA: 0
DIARRHEA: 0
STRIDOR: 0
ABDOMINAL PAIN: 1
APNEA: 0
PHOTOPHOBIA: 0
TROUBLE SWALLOWING: 0
CHEST TIGHTNESS: 0
FACIAL SWELLING: 0
EYE DISCHARGE: 0
CHOKING: 0
SHORTNESS OF BREATH: 0

## 2020-10-20 ASSESSMENT — PAIN SCALES - GENERAL
PAINLEVEL_OUTOF10: 0
PAINLEVEL_OUTOF10: 5
PAINLEVEL_OUTOF10: 10
PAINLEVEL_OUTOF10: 10
PAINLEVEL_OUTOF10: 0
PAINLEVEL_OUTOF10: 0
PAINLEVEL_OUTOF10: 5
PAINLEVEL_OUTOF10: 2
PAINLEVEL_OUTOF10: 5
PAINLEVEL_OUTOF10: 5
PAINLEVEL_OUTOF10: 7
PAINLEVEL_OUTOF10: 5
PAINLEVEL_OUTOF10: 5
PAINLEVEL_OUTOF10: 3
PAINLEVEL_OUTOF10: 3

## 2020-10-20 ASSESSMENT — PAIN DESCRIPTION - PAIN TYPE
TYPE: ACUTE PAIN
TYPE: CHRONIC PAIN

## 2020-10-20 ASSESSMENT — PAIN DESCRIPTION - LOCATION
LOCATION: KNEE
LOCATION: KNEE

## 2020-10-20 NOTE — PROGRESS NOTES
Physical Therapy  Facility/Department: [facility] 3A NURSING  Daily Treatment Note  NAME: Amandeep Kwong  : 1928  MRN: [id number]    Date of Service: 10/20/2020    Discharge [address] [address] scored a  on the AM-PAC short mobility form. Current research shows that an AM-PAC score of 17 or less is typically not associated with a discharge to the patient's home setting. Based on the patient's AM-PAC score and their current functional mobility deficits, it is recommended that the patient have 3-5 sessions per week of Physical Therapy at d/c to increase the patient's independence. Please see assessment section for further patient specific details. Pt needing 24 hr care and unable to live alone, unable to amb currently or perform stairs. If patient discharges prior to next session this note will serve as a discharge summary. Please see below for the latest assessment towards goals. PT Equipment Recommendations  Equipment Needed: No    Assessment   Body structures, Functions, Activity limitations: Decreased functional mobility ; Decreased ADL status; Decreased strength;Decreased endurance;Decreased balance; Increased pain;Decreased posture  Assessment: Pt very weak, difficulty even speaking at end of session. Pt had been living at home independently just prior to this admission. Pt needing skilled PT services to address her decreased strength and mobility issues. Pt unable to live alone or care for herself currently. Treatment Diagnosis: weakness. Prognosis: Guarded  Decision Making: Medium Complexity  PT Education: PT Role;Plan of Care;Transfer Training  Patient Education: Pt verbalized understanding but needing reinforcement. REQUIRES PT FOLLOW UP: Yes  Activity Tolerance  Activity Tolerance: Patient limited by fatigue;Patient limited by endurance  Activity Tolerance: extremely fatigued just from talking,and for all activity.      Patient Diagnosis(es): The primary encounter diagnosis was Biliary stent obstruction, initial encounter. Diagnoses of Malignant neoplasm of pancreas, unspecified location of malignancy ([address] and Pneumobilia were also pertinent to this visit. has a past medical history of Arthritis, Hypertension, Rectocele, and SVT (supraventricular tachycardia) ([address] has a past surgical history that includes Lumbar disc surgery (2008); Hysterectomy; fracture surgery (4-4-2010); Rectocele repair (4/29/13); Upper gastrointestinal endoscopy (N/A, 6/12/2019); Upper gastrointestinal endoscopy (N/A, 6/12/2019); ERCP (N/A, 6/13/2019); ERCP (N/A, 6/13/2019); ERCP (N/A, 6/13/2019); ERCP (N/A, 10/18/2020); and ERCP (N/A, 10/18/2020). Restrictions  Restrictions/Precautions  Restrictions/Precautions: Fall Risk(high fall risk)  Position Activity Restriction  Other position/activity restrictions: Shrtuhi Fish is a 80 y.o. female who presents here to the emergency department, this patient has a history of gallbladder sludge and stage I pancreatic cancer which has resolved. She states that she has had nausea and vomiting for times but nothing since 10. This all started about 6:00 in the morning. She is insistent that this is because of the time-released pill of Prilosec that she took, and if she could have some water she would feel much better. Subjective   General  Chart Reviewed: Yes  Family / Caregiver Present: Yes(OT for eval)  Subjective  Subjective: Pt reporting pain \"all over, all night long\". Pt reports currently only having R LE pain from chronic OA. Pt talking a long time to answer questions and follow instructions. General Comment  Comments: Pt seated in chair upon arrival. Pt having difficulty feeding herself and drinking. Hard coughing for 10 min after drinking broth and juice. Pt coughed up what seemed to be some phlem tinged pink from cranberry juice.   Pain Screening  Patient Currently in Pain: Yes  Pain Assessment  Pain Assessment: 0-10  Pain Level: 5  Pain Type: Chronic pain  Pain Location: Knee  Vital Signs  Patient Currently in Pain: Yes       Orientation  Orientation  Overall Orientation Status: Within Functional Limits  Cognition      Objective   Bed mobility  Rolling to Right: Supervision  Supine to Sit: (Pt seated in chair upon arrival.)  Sit to Supine: Stand by assistance  Scooting: Maximal assistance(scoot to [facility]  Comment: additional time needed to complete task. Transfers  Sit to Stand: Dependent/Total;2 Person Assistance(mod A x 2)  Stand to sit: Moderate Assistance  Bed to Chair: Dependent/Total;2 Person Assistance(mod A x 1, min A x 1 person)  Comment: Pt slow, needing VCs/TCs to complete transfer. Pt completely exhausted after activity. Ambulation  Ambulation?: No  Stairs/Curb  Stairs?: No     Balance  Posture: Fair  Sitting - Static: Good  Sitting - Dynamic: Good;-  Standing - Static: Fair(with RW)  Standing - Dynamic: Fair;-(with RW)  Comments: extreme fatigue      AROM RLE (degrees)  RLE AROM: WFL  RLE General AROM: c/o pain bilat knees but R>L. Pt reports she is due for bilat injections for OA  AROM LLE (degrees)  LLE AROM : WFL  Strength RLE  Comment: grossly +3/5  Strength LLE  Comment: grossly +3/5  Strength Other  Other: took great effort to move LEs due to weakness                 G-Code     OutComes Score                                                     AM-PAC Score  AM-PAC Inpatient Mobility Raw Score : 12 (10/20/20 0946)  AM-PAC Inpatient T-Scale Score : 35.33 (10/20/20 0946)  Mobility Inpatient CMS 0-100% Score: 68.66 (10/20/20 0946)  Mobility Inpatient CMS G-Code Modifier : CL (10/20/20 8349)          Goals  Short term goals  Time Frame for Short term goals: To be met by DC. Short term goal 1: Pt to perform bed mob with supervision. Short term goal 2: Pt to perform transfers with SBA. Short term goal 3: Pt to amb 20 ft with RW and min A.   Long term goals  Time Frame for Long term goals : LTGs=STGs  Patient Goals   Patient goals : To return home indepedently. Plan    Plan  Times per week: 3-5x/week  Times per day: Daily  Current Treatment Recommendations: Strengthening, Balance Training, Functional Mobility Training, Transfer Training, ADL/Self-care Training, Endurance Training, Gait Training, Patient/Caregiver Education & Training, Equipment Evaluation, Education, & procurement, Safety Education & Training  Safety Devices  Type of devices:  All fall risk precautions in place, Bed alarm in place, Call light within reach, Gait belt, Left in bed, Nurse notified  Restraints  Initially in place: No     Therapy Time   Individual Concurrent Group Co-treatment   Time In 0844         Time Out 0925         Minutes 41         Timed Code Treatment Minutes: [address] [facility], [id number]

## 2020-10-20 NOTE — PLAN OF CARE
Problem: Falls - Risk of:  Goal: Will remain free from falls  Description: Will remain free from falls  10/20/2020 0222 by Joseph Vang RN  Outcome: Ongoing   Fall precaution in place. Pt A&O, aware of needs. Bed in the lowest position, bed alarm on, nonskid socks on. Call light and bed side table within reach, Will continue to monitor. Problem: Pain:  Goal: Pain level will decrease  Description: Pain level will decrease  Outcome: Ongoing   Pt currently denying the pain meds, will monitor.

## 2020-10-20 NOTE — PROGRESS NOTES
[facility] GI  Gastroenterology Progress Note  Lissy Morrow is a 80 y.o. female patient. 1. Biliary stent obstruction, initial encounter    2. Malignant neoplasm of pancreas, unspecified location of malignancy ([address]    3. Pneumobilia        SUBJECTIVE:  Had nausea and retching yesterday morning but tolerated clears later. Had body aches yesterday but feels much better this morning. Physical    VITALS:  BP (!) 147/91   Pulse 88   Temp 98.2 °F (36.8 °C) (Oral)   Resp 17   Ht 5' 6\" (1.676 m)   Wt 158 lb 11.2 oz (72 kg)   SpO2 94%   BMI 25.61 kg/m²   TEMPERATURE:  Current - Temp: 98.2 °F (36.8 °C); Max - Temp  Av.1 °F (36.7 °C)  Min: 97.4 °F (36.3 °C)  Max: 98.8 °F (37.1 °C)    Abdomen soft, ND, NT, no HSM, Bowel sounds normal   nad  Cta diminished in bases    Data      Recent Labs     10/18/20  0535 10/19/20  0527 10/20/20  0536   WBC 15.0* 12.3* 8.2   HGB 12.9 12.6 13.0   HCT 39.1 38.3 39.4   MCV 92.7 93.4 92.0    164 149     Recent Labs     10/18/20  0535 10/19/20  0527    138   K 3.9 4.4    107   CO2 20* 23   BUN 27* 34*   CREATININE 1.0 1.0     Recent Labs     10/18/20  0535 10/19/20  0527   AST 92* 46*   * 79*   BILITOT 3.1* 2.1*   ALKPHOS 291* 274*     Recent Labs     10/18/20  0535 10/19/20  0527   LIPASE 25.0 264.0*             ASSESSMENT   1. Cholangitis- s/p removal of metal stent and placement of plastic pigtail stents for biliary decompression. WBC improved and bili improving. Now afeb. 2. Pancreatic cancer  3. Bacteremia- as noted in ID note        PLAN    1. Cont abx.    2. Advance diet  3. F/u outpt ERCP in 6-8 wk with Dr. Fabby Brewster. Discussed with Dr. Laila Owen, [address]      I have personally performed a face to face diagnostic evaluation on this patient. I have interviewed and examined the patient and I agree with the findings and recommended plan of care.   In summary, my findings and plan are the following: Cholangitis s/p ercp with stent exchange. Now feels better. Still some sob and need some diuresis but tolerating po now. Cont abx for cholangitis/bacteremia. F/u with dr. Yifna Tran for ercp in 6-8. Will sign off.        Janusz Wooten MD  [address] [facility] [address]

## 2020-10-20 NOTE — CARE COORDINATION
PT &OT recommending SNF , Cm Met with patient and son regarding D/C plan of care. Patient would like to go for Acute  Rehab for a few days. They were explained the therapy recommendations. Provided the son- Alia Bear   with the list of  Medicare approved SNF facilities to make three choices, was in agreement.  Judy Gan pt's  daughter requested  the list faxed to [phone]  ojlp

## 2020-10-20 NOTE — PROGRESS NOTES
[facility]    10/20/2020 10:31 AM        Name: Lissy Morrow . Admitted: 10/16/2020  Primary Care Provider: Mark Macdonald MD (Tel: [phone])      Chief Complaint   Patient presents with    Abdominal Pain     Pt from home, via [facility], states she took prilosec at 6AM and now her abdomen hurts. States had N/V X4 but nothing since 10 AM        Brief History: Patient is a 81 yo female with hx HTN, SVT, pancreatic cancer, hypothyroidism. She presented to hospital with epigastric/RUQ abdominal pain associated with n/v.     Procedure(s) 10/18/2020 by Dr Waldemar Yan:  ERCP STENT REMOVAL/EXCHANGE  ERCP BIOPSY    Subjective:  Presently resting in bed. CXR yesterday consistent with pulmonary edema and she was given a dose of Lasix with good response (1700 ml voided). Says breathing better today but still with some wheezing and cough. Has been tolerating clear liquids but does report some mild nausea at present. Denies chest pain, abdominal pain.      Reviewed interval ancillary notes    Current Medications  sodium chloride flush 0.9 % injection 10 mL, 2 times per day  HYDROmorphone HCl PF (DILAUDID) injection 0.5 mg, Once  meropenem (MERREM) 1 g in sodium chloride 0.9 % 100 mL IVPB (mini-bag), Q12H  lactated ringers infusion, Continuous  metoprolol tartrate (LOPRESSOR) tablet 12.5 mg, BID  acetaminophen (TYLENOL) tablet 650 mg, Q6H PRN    Or  acetaminophen (TYLENOL) suppository 650 mg, Q6H PRN  polyethylene glycol (GLYCOLAX) packet 17 g, Daily PRN  promethazine (PHENERGAN) tablet 12.5 mg, Q6H PRN    Or  ondansetron (ZOFRAN) injection 4 mg, Q6H PRN  enoxaparin (LOVENOX) injection 40 mg, Daily  potassium chloride (KLOR-CON M) extended release tablet 40 mEq, PRN    Or  potassium bicarb-citric acid (EFFER-K) effervescent tablet 40 mEq, PRN    Or  potassium chloride 10 mEq/100 mL IVPB (Peripheral Line), PRN  magnesium sulfate 2 g in 50 mL IVPB premix, PRN  levothyroxine (SYNTHROID) tablet 100 mcg, Daily  0.9 % sodium chloride infusion, Continuous  pantoprazole (PROTONIX) injection 40 mg, Daily      Objective:  /71   Pulse 103   Temp 97.4 °F (36.3 °C) (Axillary)   Resp 16   Ht 5' 6\" (1.676 m)   Wt 158 lb 11.2 oz (72 kg)   SpO2 96%   BMI 25.61 kg/m²     Intake/Output Summary (Last 24 hours) at 10/20/2020 1031  Last data filed at 10/20/2020 0857  Gross per 24 hour   Intake 730 ml   Output 2300 ml   Net -1570 ml      Wt Readings from Last 3 Encounters:   10/20/20 158 lb 11.2 oz (72 kg)   06/09/19 145 lb (65.8 kg)   12/10/18 145 lb (65.8 kg)     General:  Awake, alert, oriented in NAD  Skin:  Warm and dry. No unusual bruising or rash  Neck:  Supple. No JVD or carotid bruit appreciated  Chest:  Normal effort. Clear to auscultation  Cardiovascular:  RRR, normal S1/S2, no murmur/gallop/rub  Abdomen:  Soft, nontender, +bowel sounds  Extremities:  No edema  Neurological: No focal deficits  Psychological: Normal mood and affect      Labs and Tests:  CBC:   Recent Labs     10/18/20  0535 10/19/20  0527 10/20/20  0536   WBC 15.0* 12.3* 8.2   HGB 12.9 12.6 13.0    164 149     BMP:    Recent Labs     10/18/20  0535 10/19/20  0527    138   K 3.9 4.4    107   CO2 20* 23   BUN 27* 34*   CREATININE 1.0 1.0   GLUCOSE 119* 118*     Hepatic:   Recent Labs     10/18/20  0535 10/19/20  0527   AST 92* 46*   * 79*   BILITOT 3.1* 2.1*   ALKPHOS 291* 274*       CT Abdomen/Pelvis 10/16/2020:   Interval development of moderate intra and extrahepatic biliary dilation,    increased pneumobilia and pancreatic ductal dilation.  This likely represents    malfunction of the distal common bile duct stent.  Recommend GI consultation.         Gastric and hiatal hernia distention with fluid.  There is transition near    the duodenal junction with poor definition of the duodenum.  This could be    reactive with resultant relative gastric outlet obstruction.         Cholelithiasis with mild gallbladder distention with fluid, air and mild    surrounding pericholecystic edema possible cholecystitis.         Gross deformity with severe canal stenosis L2-3 with prior compression    fractures and vertebral augmentation.  This is unchanged.             PCXR 10/19/2020:  Right greater than left bibasilar pulmonary opacities with possible trace    bilateral effusions.  In the setting of cardiomegaly this may be on the basis    of pulmonary edema/positive fluid balance, though multilobar infection with    parapneumonic effusions is a consideration in the appropriate clinical    setting. Problem List  Active Problems:    Panc[address]ic [address] [address]    Biliary stent obstruction, initial encounter  Resolved Problems:    * No resolved hospital problems. *       Assessment & Plan:   1. Abdominal pain. Hx pancreatic cancer (dx 6/2019). S/p ERCP with biliary stent placement 6/2019, no follow up or treatment since. CT scan this admission concerning for malfunction of stent, concern for gastric outlet obstruction as well. ERCP with stent placement yesterday (10/18) with resolution of abdominal pain. Live enzymes trending down. GI and surgery on case. 2. Bacteremia. Blood cultures + for Klebsiella and Streptococcus. Believed source is biliary sepsis secondary to obstructed/migrated biliary stent. Initially started on Zosyn, transitioned to meropenem (10/18),  WBC trending down,  11.6->4.6->15.0->12.3->8.2. Remains afebrile. ID on case. 3. Dyspnea. ProBNP > 59966. Received IV Lsix x 1 with good results. Renal results pending, if stable, will repeat Lasix. Will obtain echo and PA and lateral CXR. 4. Hypotension. Resolved. BP improved, will resume diltiazem at lower dose. Remains in sinus rhythm. 5. Hypothyroidism. Continue levothyroxine. TSH 1.55.   6. Deconditioning. PT/OT recommend SNF on DC for rehab.  Case management consulted for DC planning. Disposition: PT/OT have evaluated, patient scored 12/24 on short mobility form, 14/24 on ADL form. Recommend SNF for rehab on DC, unsafe for patient to be alone, will need 24/7 assistance. Case management has been consulted for DC planning. Diet: DIET GENERAL;  Code:Full Code  DVT PPX: enoxaparin.        AME Biswas CNP   10/20/2020 10:31 AM

## 2020-10-20 NOTE — PROGRESS NOTES
Spoke with family 3 times today over the phone to answer questions. Son, Gumaro Gimenez, came in to visit for approximately 1.5 hours. RN went over plan of care with son at that time.

## 2020-10-20 NOTE — PROGRESS NOTES
Occupational Therapy   Occupational Therapy Initial Assessment  Date: 10/20/2020   Patient Name: Kaitlin Nicole  MRN: [id number]     : 1928    Date of Service: 10/20/2020    Discharge Recommendations:  Kaitlin Nicole scored a 14/24 on the AM-PAC ADL Inpatient form. Current research shows that an AM-PAC score of 17 or less is typically not associated with a discharge to the patient's home setting. Based on the patient's AM-PAC score and their current ADL deficits, it is recommended that the patient have 3-5 sessions per week of Occupational Therapy at d/c to increase the patient's independence. Please see assessment section for further patient specific details. If patient discharges prior to next session this note will serve as a discharge summary. Please see below for the latest assessment towards goals. OT Equipment Recommendations  Equipment Needed: No    Assessment   Performance deficits / Impairments: Decreased functional mobility ; Decreased ADL status; Decreased endurance  Assessment: Pt below baseline level of occupational function. Pt would benefit from acute OT services to address above deficits  Treatment Diagnosis: Decreased functional mobility, ADL status, and endurance associated w/pancreatic cancer  Prognosis: Fair  Decision Making: Low Complexity  History: Pt 79 y/o F; lives alone in 2 story house; IND ADLs and IADLs including driving; 2 recent falls.  PMH: Pancreatic cancer, Whipple procedure, multiple ERCPs  Exam: 6-clicks, 3 functional performance deficits, evolving status  Assistance / Modification: Min A of 1 and Mod A of 1 for functional transfer  OT Education: OT Role;Plan of Care  Patient Education: discharge; pt independently verbalized understanding  Barriers to Learning: none  REQUIRES OT FOLLOW UP: Yes  Activity Tolerance  Activity Tolerance: Patient limited by fatigue  Activity Tolerance: Pt participation limited d/t fatigue and weakness  Safety Devices  Safety Devices in place: Yes  Type of devices: Patient at risk for falls; Left in bed;Bed alarm in place;Call light within reach;Nurse notified;Gait belt  Restraints  Initially in place: No           Patient Diagnosis(es): The primary encounter diagnosis was Biliary stent obstruction, initial encounter. Diagnoses of Malignant neoplasm of pancreas, unspecified location of malignancy ([facility] [address] and Pneumobilia were also pertinent to this visit. has a past medical history of Arthritis, Hypertension, Rectocele, and SVT (supraventricular tachycardia) ([address] has a past surgical history that includes Lumbar disc surgery (2008); Hysterectomy; fracture surgery (4-4-2010); Rectocele repair (4/29/13); Upper gastrointestinal endoscopy (N/A, 6/12/2019); Upper gastrointestinal endoscopy (N/A, 6/12/2019); ERCP (N/A, 6/13/2019); ERCP (N/A, 6/13/2019); ERCP (N/A, 6/13/2019); ERCP (N/A, 10/18/2020); and ERCP (N/A, 10/18/2020). Treatment Diagnosis: Decreased functional mobility, ADL status, and endurance associated w/pancreatic cancer      Restrictions  Restrictions/Precautions  Restrictions/Precautions: Fall Risk(high fall risk)  Position Activity Restriction  Other position/activity restrictions: Bacilio Silva is a 80 y.o. female who presents here to the emergency department, this patient has a history of gallbladder sludge and stage I pancreatic cancer which has resolved. She states that she has had nausea and vomiting for times but nothing since 10. This all started about 6:00 in the morning. She is insistent that this is because of the time-released pill of Prilosec that she took, and if she could have some water she would feel much better. Subjective   General  Chart Reviewed: Yes  Family / Caregiver Present: No  Diagnosis: pancreatic cancer  Subjective  Subjective: Pt seated upright in chair eating breakfast upon arrival; pleasant and agreeable to OT eval. Pt states she had a rough night with 10/10 pain all over.  Pt states pain currently 5/10 in knees. Patient Currently in Pain: Yes  Pain Assessment  Pain Assessment: 0-10  Pain Level: 5  Pain Type: Chronic pain  Pain Location: Knee  Pre Treatment Pain Screening  Intervention List: Patient able to continue with treatment;Nurse/Physician notified  Patient Currently in Pain: Yes  O2 Device: None (Room air)  Social/Functional History  Social/Functional History  Lives With: Alone  Type of Home: House  Home Layout: Bed/Bath upstairs, Able to Live on Main level with bedroom/bathroom, 1/2 bath on main level  Home Access: Stairs to enter with rails  Entrance Stairs - Number of Steps: 3 MAURICIO  Entrance Stairs - Rails: Both  Bathroom Toilet: Standard  Home Equipment: 4 wheeled walker, Reacher, Alert Button  ADL Assistance: Independent  Homemaking Assistance: Needs assistance(assist from family)  Homemaking Responsibilities: No(housekeeping 1x/mo)  Ambulation Assistance: Independent  Transfer Assistance: Independent  Active : Yes(however, recently people helping her with driving.)  Additional Comments: Pt reports several falls, on deck outside, inside home also. Pt reporting she eventually got up on her own but did not have life alert button on her so not able to call for help.        Objective   Vision: Impaired  Vision Exceptions: Wears glasses for reading  Hearing: Exceptions to [facility]  Hearing Exceptions: Hard of hearing/hearing concerns;Bilateral hearing aid    Orientation  Overall Orientation Status: Within Normal Limits  Observation/Palpation  Posture: Fair  Observation: drowsy, decreased alertness during eval off and on  Balance  Sitting Balance: Supervision  Standing Balance: Dependent/Total(mod A of 1,  min A of 1)  Standing Balance  Time: ~30 sec  Activity: stand-step transfer from chair to bed  Comment: w/RW  Functional Mobility  Functional - Mobility Device: Rolling Walker  Activity: Other  Assist Level: Dependent/Total(min A of 1, mod A of 1)  Functional Mobility Comments: stand-step transfer from bed to chair; pt required vc to complete stand and walker mangement  ADL  Feeding: Increased time to complete; Moderate assistance(liquid diet; max A to open container; pt experienced coughing spell while eating that required extra time to recover from (possible aspriation))  Grooming: Stand by assistance  Additional Comments: pt unable to complete further ADLs d/t fatigue  Tone RUE  RUE Tone: Normotonic  Tone LUE  LUE Tone: Normotonic  Coordination  Movements Are Fluid And Coordinated: Yes     Bed mobility  Rolling to Right: Supervision  Sit to Supine: Stand by assistance  Scooting: Maximal assistance  Comment: increased time to complete; pt seated in chair upon arrival  Transfers  Stand Step Transfers: Dependent/Total;2 Person assistance(min A of 1, mod A of 1)  Sit to stand: Dependent/Total;2 Person assistance(mod A x2)  Stand to sit: Dependent/Total;2 Person assistance(Min A of 1 Mod A of 1)  Vision - Basic Assessment  Prior Vision: Wears glasses only for reading  Visual History: No significant visual history  Patient Visual Report: No visual complaint reported. Cognition  Overall Cognitive Status: Exceptions  Arousal/Alertness: Appropriate responses to stimuli  Following Commands: Follows one step commands with increased time; Follows one step commands with repetition  Attention Span: Attends with cues to redirect; Difficulty dividing attention  Memory: Appears intact  Safety Judgement: Good awareness of safety precautions  Problem Solving: Assistance required to generate solutions  Insights: Fully aware of deficits  Initiation: Requires cues for some  Sequencing: Does not require cues  Cognition Comment: pt level of alertness fluctuating during eval d/t fatigue  Perception  Overall Perceptual Status: WFL     Sensation  Overall Sensation Status: WFL        LUE AROM (degrees)  LUE AROM : WFL  LUE General AROM: observed through functional ax  Left Hand AROM (degrees)  Left Hand AROM: WFL  Left Hand General AROM: observed through functional ax  RUE AROM (degrees)  RUE AROM : WFL  RUE General AROM: observed through functional ax  Right Hand AROM (degrees)  Right Hand AROM: WFL  Right Hand General AROM: observed through functional ax  LUE Strength  L Hand General: NT  LUE Strength Comment: pt reports weakness all over  RUE Strength  R Hand General: NT  RUE Strength Comment: pt reports weakness all over                   Plan   Plan  Times per week: 3-5  Times per day: Daily  Current Treatment Recommendations: Balance Training, Functional Mobility Training, Endurance Training, Safety Education & Training, Self-Care / ADL, Patient/Caregiver Education & Training    AM-PAC Score        AM-PAC Inpatient Daily Activity Raw Score: 14 (10/20/20 0948)  AM-PAC Inpatient ADL T-Scale Score : 33.39 (10/20/20 0948)  ADL Inpatient CMS 0-100% Score: 59.67 (10/20/20 0948)  ADL Inpatient CMS G-Code Modifier : CK (10/20/20 0948)    Goals  Short term goals  Time Frame for Short term goals: discharge  Short term goal 1: Mod A for functional transfers to ADL surfaces w/RW  Short term goal 2: Mod A for functional mobility for ADL tasks w/RW  Short term goal 3: Min A LB dressing/bathing  Short term goal 4: SBA UB dressing/bathing  Short term goal 5: S/U for grooming tasks while seated       Therapy Time   Individual Concurrent Group Co-treatment   Time In 0844         Time Out 0925         Minutes 41              Timed Code Treatment Minutes:  26 Minutes    Total Treatment Minutes:  41 min    C/ Speedy [address], OT     Marilia Velazquez S/OT  I have directly observed this treatment and have read and approve this note.    Meka Vela., OTR/L, [id number]
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
## 2020-10-21 ENCOUNTER — APPOINTMENT (OUTPATIENT)
Dept: GENERAL RADIOLOGY | Age: 85
DRG: 919 | End: 2020-10-21
Payer: MEDICARE

## 2020-10-21 ENCOUNTER — ANESTHESIA (OUTPATIENT)
Dept: ENDOSCOPY | Age: 85
DRG: 919 | End: 2020-10-21
Payer: MEDICARE

## 2020-10-21 ENCOUNTER — ANESTHESIA EVENT (OUTPATIENT)
Dept: ENDOSCOPY | Age: 85
DRG: 919 | End: 2020-10-21
Payer: MEDICARE

## 2020-10-21 VITALS
RESPIRATION RATE: 26 BRPM | OXYGEN SATURATION: 99 % | SYSTOLIC BLOOD PRESSURE: 127 MMHG | TEMPERATURE: 97.9 F | DIASTOLIC BLOOD PRESSURE: 60 MMHG

## 2020-10-21 PROBLEM — I77.810 ASCENDING AORTA DILATION (HCC): Status: ACTIVE | Noted: 2020-10-21

## 2020-10-21 LAB
A/G RATIO: 0.6 (ref 1.1–2.2)
ALBUMIN SERPL-MCNC: 2.2 G/DL (ref 3.4–5)
ALP BLD-CCNC: 533 U/L (ref 40–129)
ALT SERPL-CCNC: 61 U/L (ref 10–40)
ANION GAP SERPL CALCULATED.3IONS-SCNC: 10 MMOL/L (ref 3–16)
AST SERPL-CCNC: 52 U/L (ref 15–37)
BASOPHILS ABSOLUTE: 0 K/UL (ref 0–0.2)
BASOPHILS RELATIVE PERCENT: 0.1 %
BILIRUB SERPL-MCNC: 6.5 MG/DL (ref 0–1)
BUN BLDV-MCNC: 17 MG/DL (ref 7–20)
CALCIUM SERPL-MCNC: 8.3 MG/DL (ref 8.3–10.6)
CHLORIDE BLD-SCNC: 103 MMOL/L (ref 99–110)
CO2: 23 MMOL/L (ref 21–32)
CREAT SERPL-MCNC: <0.5 MG/DL (ref 0.6–1.2)
EOSINOPHILS ABSOLUTE: 0 K/UL (ref 0–0.6)
EOSINOPHILS RELATIVE PERCENT: 0.2 %
GFR AFRICAN AMERICAN: >60
GFR NON-AFRICAN AMERICAN: >60
GLOBULIN: 3.6 G/DL
GLUCOSE BLD-MCNC: 119 MG/DL (ref 70–99)
HCT VFR BLD CALC: 39.6 % (ref 36–48)
HEMOGLOBIN: 13.2 G/DL (ref 12–16)
LIPASE: 734 U/L (ref 13–60)
LV EF: 50 %
LVEF MODALITY: NORMAL
LYMPHOCYTES ABSOLUTE: 0.8 K/UL (ref 1–5.1)
LYMPHOCYTES RELATIVE PERCENT: 7.5 %
MCH RBC QN AUTO: 30.4 PG (ref 26–34)
MCHC RBC AUTO-ENTMCNC: 33.2 G/DL (ref 31–36)
MCV RBC AUTO: 91.6 FL (ref 80–100)
MONOCYTES ABSOLUTE: 0.8 K/UL (ref 0–1.3)
MONOCYTES RELATIVE PERCENT: 7.1 %
NEUTROPHILS ABSOLUTE: 9.2 K/UL (ref 1.7–7.7)
NEUTROPHILS RELATIVE PERCENT: 85.1 %
PDW BLD-RTO: 13.2 % (ref 12.4–15.4)
PLATELET # BLD: 161 K/UL (ref 135–450)
PMV BLD AUTO: 10.7 FL (ref 5–10.5)
POTASSIUM SERPL-SCNC: 3.8 MMOL/L (ref 3.5–5.1)
RBC # BLD: 4.33 M/UL (ref 4–5.2)
SODIUM BLD-SCNC: 136 MMOL/L (ref 136–145)
TOTAL PROTEIN: 5.8 G/DL (ref 6.4–8.2)
WBC # BLD: 10.8 K/UL (ref 4–11)

## 2020-10-21 PROCEDURE — 0FJB8ZZ INSPECTION OF HEPATOBILIARY DUCT, VIA NATURAL OR ARTIFICIAL OPENING ENDOSCOPIC: ICD-10-PCS | Performed by: INTERNAL MEDICINE

## 2020-10-21 PROCEDURE — 3700000000 HC ANESTHESIA ATTENDED CARE: Performed by: INTERNAL MEDICINE

## 2020-10-21 PROCEDURE — 36415 COLL VENOUS BLD VENIPUNCTURE: CPT

## 2020-10-21 PROCEDURE — 99233 SBSQ HOSP IP/OBS HIGH 50: CPT | Performed by: INTERNAL MEDICINE

## 2020-10-21 PROCEDURE — 3609155700 HC EGD STENT REMOVAL: Performed by: INTERNAL MEDICINE

## 2020-10-21 PROCEDURE — C1769 GUIDE WIRE: HCPCS | Performed by: INTERNAL MEDICINE

## 2020-10-21 PROCEDURE — 93306 TTE W/DOPPLER COMPLETE: CPT

## 2020-10-21 PROCEDURE — 6360000002 HC RX W HCPCS: Performed by: INTERNAL MEDICINE

## 2020-10-21 PROCEDURE — 2709999900 HC NON-CHARGEABLE SUPPLY: Performed by: INTERNAL MEDICINE

## 2020-10-21 PROCEDURE — 6370000000 HC RX 637 (ALT 250 FOR IP): Performed by: NURSE PRACTITIONER

## 2020-10-21 PROCEDURE — 74330 X-RAY BILE/PANC ENDOSCOPY: CPT

## 2020-10-21 PROCEDURE — 2500000003 HC RX 250 WO HCPCS: Performed by: NURSE ANESTHETIST, CERTIFIED REGISTERED

## 2020-10-21 PROCEDURE — 3609013700 HC EGD STENT PLACEMENT: Performed by: INTERNAL MEDICINE

## 2020-10-21 PROCEDURE — 1200000000 HC SEMI PRIVATE

## 2020-10-21 PROCEDURE — 6360000002 HC RX W HCPCS: Performed by: NURSE ANESTHETIST, CERTIFIED REGISTERED

## 2020-10-21 PROCEDURE — 80053 COMPREHEN METABOLIC PANEL: CPT

## 2020-10-21 PROCEDURE — 3700000001 HC ADD 15 MINUTES (ANESTHESIA): Performed by: INTERNAL MEDICINE

## 2020-10-21 PROCEDURE — 0D798DZ DILATION OF DUODENUM WITH INTRALUMINAL DEVICE, VIA NATURAL OR ARTIFICIAL OPENING ENDOSCOPIC: ICD-10-PCS | Performed by: INTERNAL MEDICINE

## 2020-10-21 PROCEDURE — 7100000000 HC PACU RECOVERY - FIRST 15 MIN: Performed by: INTERNAL MEDICINE

## 2020-10-21 PROCEDURE — 0FJD8ZZ INSPECTION OF PANCREATIC DUCT, VIA NATURAL OR ARTIFICIAL OPENING ENDOSCOPIC: ICD-10-PCS | Performed by: INTERNAL MEDICINE

## 2020-10-21 PROCEDURE — 6360000002 HC RX W HCPCS: Performed by: ANESTHESIOLOGY

## 2020-10-21 PROCEDURE — C9113 INJ PANTOPRAZOLE SODIUM, VIA: HCPCS | Performed by: INTERNAL MEDICINE

## 2020-10-21 PROCEDURE — 85025 COMPLETE CBC W/AUTO DIFF WBC: CPT

## 2020-10-21 PROCEDURE — BF111ZZ FLUOROSCOPY OF BILIARY AND PANCREATIC DUCTS USING LOW OSMOLAR CONTRAST: ICD-10-PCS | Performed by: INTERNAL MEDICINE

## 2020-10-21 PROCEDURE — 3609017700 HC EGD DILATION GASTRIC/DUODENAL STRICTURE: Performed by: INTERNAL MEDICINE

## 2020-10-21 PROCEDURE — 2580000003 HC RX 258: Performed by: ANESTHESIOLOGY

## 2020-10-21 PROCEDURE — 2580000003 HC RX 258: Performed by: INTERNAL MEDICINE

## 2020-10-21 PROCEDURE — 2580000003 HC RX 258: Performed by: PHYSICIAN ASSISTANT

## 2020-10-21 PROCEDURE — 6370000000 HC RX 637 (ALT 250 FOR IP): Performed by: INTERNAL MEDICINE

## 2020-10-21 PROCEDURE — 7100000001 HC PACU RECOVERY - ADDTL 15 MIN: Performed by: INTERNAL MEDICINE

## 2020-10-21 PROCEDURE — C1876 STENT, NON-COA/NON-COV W/DEL: HCPCS | Performed by: INTERNAL MEDICINE

## 2020-10-21 PROCEDURE — 3609018800 HC ERCP DX COLLECTION SPECIMEN BRUSHING/WASHING: Performed by: INTERNAL MEDICINE

## 2020-10-21 PROCEDURE — C1726 CATH, BAL DIL, NON-VASCULAR: HCPCS | Performed by: INTERNAL MEDICINE

## 2020-10-21 PROCEDURE — 2700000000 HC OXYGEN THERAPY PER DAY

## 2020-10-21 PROCEDURE — 0D798ZZ DILATION OF DUODENUM, VIA NATURAL OR ARTIFICIAL OPENING ENDOSCOPIC: ICD-10-PCS | Performed by: INTERNAL MEDICINE

## 2020-10-21 PROCEDURE — 71046 X-RAY EXAM CHEST 2 VIEWS: CPT

## 2020-10-21 PROCEDURE — 83690 ASSAY OF LIPASE: CPT

## 2020-10-21 DEVICE — STENT SYSTEM WITH ANCHOR LOCK DELIVERY SYSTEM
Type: IMPLANTABLE DEVICE | Site: DUODENUM | Status: FUNCTIONAL
Brand: WALLFLEX™ DUODENAL

## 2020-10-21 RX ORDER — ESMOLOL HYDROCHLORIDE 10 MG/ML
INJECTION INTRAVENOUS PRN
Status: DISCONTINUED | OUTPATIENT
Start: 2020-10-21 | End: 2020-10-21 | Stop reason: SDUPTHER

## 2020-10-21 RX ORDER — HYDROMORPHONE HCL 110MG/55ML
0.5 PATIENT CONTROLLED ANALGESIA SYRINGE INTRAVENOUS EVERY 5 MIN PRN
Status: DISCONTINUED | OUTPATIENT
Start: 2020-10-21 | End: 2020-10-21 | Stop reason: HOSPADM

## 2020-10-21 RX ORDER — ONDANSETRON 2 MG/ML
4 INJECTION INTRAMUSCULAR; INTRAVENOUS
Status: DISCONTINUED | OUTPATIENT
Start: 2020-10-21 | End: 2020-10-21 | Stop reason: HOSPADM

## 2020-10-21 RX ORDER — PROPOFOL 10 MG/ML
INJECTION, EMULSION INTRAVENOUS PRN
Status: DISCONTINUED | OUTPATIENT
Start: 2020-10-21 | End: 2020-10-21 | Stop reason: SDUPTHER

## 2020-10-21 RX ORDER — ROCURONIUM BROMIDE 10 MG/ML
INJECTION, SOLUTION INTRAVENOUS PRN
Status: DISCONTINUED | OUTPATIENT
Start: 2020-10-21 | End: 2020-10-21 | Stop reason: SDUPTHER

## 2020-10-21 RX ORDER — MEPERIDINE HYDROCHLORIDE 25 MG/ML
12.5 INJECTION INTRAMUSCULAR; INTRAVENOUS; SUBCUTANEOUS EVERY 5 MIN PRN
Status: DISCONTINUED | OUTPATIENT
Start: 2020-10-21 | End: 2020-10-21 | Stop reason: HOSPADM

## 2020-10-21 RX ORDER — ONDANSETRON 2 MG/ML
4 INJECTION INTRAMUSCULAR; INTRAVENOUS ONCE
Status: COMPLETED | OUTPATIENT
Start: 2020-10-21 | End: 2020-10-21

## 2020-10-21 RX ORDER — LABETALOL HYDROCHLORIDE 5 MG/ML
5 INJECTION, SOLUTION INTRAVENOUS EVERY 10 MIN PRN
Status: DISCONTINUED | OUTPATIENT
Start: 2020-10-21 | End: 2020-10-21 | Stop reason: HOSPADM

## 2020-10-21 RX ORDER — OXYCODONE HYDROCHLORIDE AND ACETAMINOPHEN 5; 325 MG/1; MG/1
1 TABLET ORAL
Status: DISCONTINUED | OUTPATIENT
Start: 2020-10-21 | End: 2020-10-21 | Stop reason: HOSPADM

## 2020-10-21 RX ORDER — FUROSEMIDE 10 MG/ML
20 INJECTION INTRAMUSCULAR; INTRAVENOUS DAILY
Status: DISCONTINUED | OUTPATIENT
Start: 2020-10-22 | End: 2020-10-21

## 2020-10-21 RX ORDER — PHENYLEPHRINE HCL IN 0.9% NACL 1 MG/10 ML
SYRINGE (ML) INTRAVENOUS PRN
Status: DISCONTINUED | OUTPATIENT
Start: 2020-10-21 | End: 2020-10-21 | Stop reason: SDUPTHER

## 2020-10-21 RX ORDER — ONDANSETRON 2 MG/ML
INJECTION INTRAMUSCULAR; INTRAVENOUS PRN
Status: DISCONTINUED | OUTPATIENT
Start: 2020-10-21 | End: 2020-10-21 | Stop reason: SDUPTHER

## 2020-10-21 RX ORDER — SODIUM CHLORIDE 9 MG/ML
INJECTION, SOLUTION INTRAVENOUS CONTINUOUS
Status: DISCONTINUED | OUTPATIENT
Start: 2020-10-21 | End: 2020-10-22

## 2020-10-21 RX ORDER — LIDOCAINE HYDROCHLORIDE 20 MG/ML
INJECTION, SOLUTION EPIDURAL; INFILTRATION; INTRACAUDAL; PERINEURAL PRN
Status: DISCONTINUED | OUTPATIENT
Start: 2020-10-21 | End: 2020-10-21 | Stop reason: SDUPTHER

## 2020-10-21 RX ORDER — HYDRALAZINE HYDROCHLORIDE 20 MG/ML
5 INJECTION INTRAMUSCULAR; INTRAVENOUS EVERY 10 MIN PRN
Status: DISCONTINUED | OUTPATIENT
Start: 2020-10-21 | End: 2020-10-21 | Stop reason: HOSPADM

## 2020-10-21 RX ORDER — SUCCINYLCHOLINE/SOD CL,ISO/PF 200MG/10ML
SYRINGE (ML) INTRAVENOUS PRN
Status: DISCONTINUED | OUTPATIENT
Start: 2020-10-21 | End: 2020-10-21 | Stop reason: SDUPTHER

## 2020-10-21 RX ORDER — FENTANYL CITRATE 50 UG/ML
25 INJECTION, SOLUTION INTRAMUSCULAR; INTRAVENOUS ONCE
Status: COMPLETED | OUTPATIENT
Start: 2020-10-21 | End: 2020-10-21

## 2020-10-21 RX ORDER — SODIUM CHLORIDE 9 MG/ML
INJECTION, SOLUTION INTRAVENOUS CONTINUOUS
Status: DISCONTINUED | OUTPATIENT
Start: 2020-10-21 | End: 2020-10-21

## 2020-10-21 RX ORDER — FUROSEMIDE 10 MG/ML
20 INJECTION INTRAMUSCULAR; INTRAVENOUS 2 TIMES DAILY
Status: DISCONTINUED | OUTPATIENT
Start: 2020-10-21 | End: 2020-10-21

## 2020-10-21 RX ADMIN — DILTIAZEM HYDROCHLORIDE 30 MG: 30 TABLET, FILM COATED ORAL at 05:57

## 2020-10-21 RX ADMIN — LEVOTHYROXINE SODIUM 100 MCG: 0.1 TABLET ORAL at 05:57

## 2020-10-21 RX ADMIN — FENTANYL CITRATE 25 MCG: 50 INJECTION INTRAMUSCULAR; INTRAVENOUS at 12:47

## 2020-10-21 RX ADMIN — Medication 100 MG: at 14:09

## 2020-10-21 RX ADMIN — PROPOFOL 50 MG: 10 INJECTION, EMULSION INTRAVENOUS at 14:07

## 2020-10-21 RX ADMIN — Medication 100 MCG: at 14:21

## 2020-10-21 RX ADMIN — METOPROLOL TARTRATE 12.5 MG: 25 TABLET, FILM COATED ORAL at 11:51

## 2020-10-21 RX ADMIN — ONDANSETRON 4 MG: 2 INJECTION INTRAMUSCULAR; INTRAVENOUS at 14:30

## 2020-10-21 RX ADMIN — ESMOLOL HYDROCHLORIDE 10 MG: 10 INJECTION, SOLUTION INTRAVENOUS at 14:07

## 2020-10-21 RX ADMIN — SODIUM CHLORIDE: 9 INJECTION, SOLUTION INTRAVENOUS at 13:02

## 2020-10-21 RX ADMIN — SODIUM CHLORIDE: 9 INJECTION, SOLUTION INTRAVENOUS at 13:01

## 2020-10-21 RX ADMIN — METOPROLOL TARTRATE 12.5 MG: 25 TABLET, FILM COATED ORAL at 20:34

## 2020-10-21 RX ADMIN — ROCURONIUM BROMIDE 5 MG: 10 INJECTION, SOLUTION INTRAVENOUS at 14:07

## 2020-10-21 RX ADMIN — PANTOPRAZOLE SODIUM 40 MG: 40 INJECTION, POWDER, FOR SOLUTION INTRAVENOUS at 11:51

## 2020-10-21 RX ADMIN — ONDANSETRON 4 MG: 2 INJECTION INTRAMUSCULAR; INTRAVENOUS at 12:51

## 2020-10-21 RX ADMIN — LIDOCAINE HYDROCHLORIDE 50 MG: 20 INJECTION, SOLUTION EPIDURAL; INFILTRATION; INTRACAUDAL; PERINEURAL at 14:07

## 2020-10-21 RX ADMIN — Medication 100 MCG: at 14:24

## 2020-10-21 RX ADMIN — MEROPENEM 1 G: 1 INJECTION, POWDER, FOR SOLUTION INTRAVENOUS at 11:51

## 2020-10-21 RX ADMIN — ACETAMINOPHEN 650 MG: 325 TABLET ORAL at 04:23

## 2020-10-21 RX ADMIN — Medication 100 MCG: at 14:38

## 2020-10-21 RX ADMIN — DILTIAZEM HYDROCHLORIDE 30 MG: 30 TABLET, FILM COATED ORAL at 20:34

## 2020-10-21 RX ADMIN — Medication 100 MCG: at 14:27

## 2020-10-21 RX ADMIN — MEROPENEM 1 G: 1 INJECTION, POWDER, FOR SOLUTION INTRAVENOUS at 20:34

## 2020-10-21 ASSESSMENT — PULMONARY FUNCTION TESTS
PIF_VALUE: 15
PIF_VALUE: 19
PIF_VALUE: 16
PIF_VALUE: 15
PIF_VALUE: 21
PIF_VALUE: 16
PIF_VALUE: 1
PIF_VALUE: 16
PIF_VALUE: 19
PIF_VALUE: 15
PIF_VALUE: 16
PIF_VALUE: 16
PIF_VALUE: 17
PIF_VALUE: 16
PIF_VALUE: 21
PIF_VALUE: 21
PIF_VALUE: 16
PIF_VALUE: 15
PIF_VALUE: 17
PIF_VALUE: 20
PIF_VALUE: 16
PIF_VALUE: 1
PIF_VALUE: 17
PIF_VALUE: 17
PIF_VALUE: 16
PIF_VALUE: 19
PIF_VALUE: 16
PIF_VALUE: 21
PIF_VALUE: 16
PIF_VALUE: 2
PIF_VALUE: 16
PIF_VALUE: 20
PIF_VALUE: 16
PIF_VALUE: 5
PIF_VALUE: 16
PIF_VALUE: 0
PIF_VALUE: 16
PIF_VALUE: 20
PIF_VALUE: 16
PIF_VALUE: 3
PIF_VALUE: 16
PIF_VALUE: 17
PIF_VALUE: 15
PIF_VALUE: 17
PIF_VALUE: 22
PIF_VALUE: 15
PIF_VALUE: 3
PIF_VALUE: 19
PIF_VALUE: 16
PIF_VALUE: 0
PIF_VALUE: 16
PIF_VALUE: 15
PIF_VALUE: 16
PIF_VALUE: 3
PIF_VALUE: 15
PIF_VALUE: 3
PIF_VALUE: 21
PIF_VALUE: 24
PIF_VALUE: 19
PIF_VALUE: 15
PIF_VALUE: 16
PIF_VALUE: 15
PIF_VALUE: 16
PIF_VALUE: 16
PIF_VALUE: 3
PIF_VALUE: 0
PIF_VALUE: 20
PIF_VALUE: 16
PIF_VALUE: 16
PIF_VALUE: 21

## 2020-10-21 ASSESSMENT — PAIN SCALES - GENERAL
PAINLEVEL_OUTOF10: 4
PAINLEVEL_OUTOF10: 3
PAINLEVEL_OUTOF10: 0

## 2020-10-21 ASSESSMENT — PAIN DESCRIPTION - PAIN TYPE
TYPE: CHRONIC PAIN
TYPE: CHRONIC PAIN

## 2020-10-21 ASSESSMENT — ENCOUNTER SYMPTOMS
SHORTNESS OF BREATH: 0
COLOR CHANGE: 0
RHINORRHEA: 0
CHOKING: 0
FACIAL SWELLING: 0
ABDOMINAL PAIN: 1
BLOOD IN STOOL: 0
DIARRHEA: 0
EYE REDNESS: 0
NAUSEA: 0
STRIDOR: 0
COUGH: 0
TROUBLE SWALLOWING: 0
CHEST TIGHTNESS: 0
APNEA: 0
EYE DISCHARGE: 0
PHOTOPHOBIA: 0
SHORTNESS OF BREATH: 0

## 2020-10-21 ASSESSMENT — PAIN DESCRIPTION - LOCATION
LOCATION: ABDOMEN
LOCATION: ABDOMEN;LEG

## 2020-10-21 NOTE — PROGRESS NOTES
RN attempted to updated daughter, Aram Schwab. Patient's son-in-law adamant about taking the phone message. Patient's son-in-law updated on plan of care. Patient and son-in-law aware of procedure.

## 2020-10-21 NOTE — PLAN OF CARE
Problem: Falls - Risk of:  Goal: Will remain free from falls  Description: Will remain free from falls  10/21/2020 0753 by Savanna Oviedo RN  Outcome: Ongoing  Note: High fall risk per Bauer scale. Fall precautions in place, bed alarm engaged. Non-skid footwear on patient, belongings within reach, bed in lowest position. Problem: Pain:  Goal: Control of acute pain  Description: Control of acute pain  Outcome: Ongoing  Note: Patient with intermittent abdominal pain. CT abdomen done over night, GI and surgery consulted. Clear liquid diet.       Problem: Musculor/Skeletal Functional Status  Goal: Highest potential functional level  Outcome: Ongoing
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
## 2020-10-21 NOTE — BRIEF OP NOTE
Brief Postoperative Note - Full Note in Chart Review/Procedures tab       Patient: Donna Adams  YOB: 1928  MRN: [id number]    Date of Procedure: 10/21/2020    Pre-Op Diagnosis:  Duodenal obstruction     Biliary stent obstruction    Post-Op Diagnosis: Same       Procedure(s):  ERCP ENDOSCOPIC RETROGRADE CHOLANGIOPANCREATOGRAPHY  EGD BILIARY STENT REMOVAL  EGD DUODENAL STENT PLACEMENT 22MMX 80 MM WALLFLEX  EGD DUODENAL DILATION WITH 20 MM  BALLOON    Surgeon(s):  Jeniffer Bai MD    Assistant:  * No surgical staff found *    Anesthesia: General    Estimated Blood Loss (mL): Minimal    Complications: None    Specimens:   * No specimens in log *    Implants:  Implant Name Type Inv. Item Serial No.  Lot No. LRB No. Used Action   STENT BILI SOLUS AND INTRO SET W/ [id number] Stent:Biliary/Pancreatic/Iliac STENT BILI SOLUS AND INTRO SET W/ [id number] Stephen Marilynn 12 [id number] N/A 1 Explanted   STENT DUODENAL [id number] - [id number] Stent:Biliary/Pancreatic/Iliac STENT DUODENAL [id number] [employer] INTERVENT[employer]AL [employer]IO [id number]  1 Implanted         Drains: * No LDAs found *    Findings:  Periampullary duodenal stenosis    Biliary stent removed    New 22 mm width x 90 mm length expandable metal mesh (WallFlex Duodenal) stent placement performed    Rec:  1) Continue antibiotics  2) Daily Labs  3) Clear liquid diet  4) Consider repeat ERCP vs PTC for biliary drainage depending on her progress.   5) F/u as inpatient    Electronically signed by Jeniffer Bai MD on 10/21/2020 at 3:22 PM

## 2020-10-21 NOTE — PROGRESS NOTES
Adm to pacu from endo per cart awakening. Drowsy. Respirations easy & symmetrical. Abdomen soft. Denies pain.

## 2020-10-21 NOTE — PROGRESS NOTES
Patient to pre-op at this time. Bedside report given to pre-op RN. Daughter, Baldo Eubanks updated on time of procedure.

## 2020-10-21 NOTE — PROGRESS NOTES
[facility] PROGRESS NOTE    10/21/2020 4:04 PM        Name: Diamond Maloney . Admitted: 10/16/2020  Primary Care Provider: Srini Christopher MD (Tel: [phone])      Chief Complaint   Patient presents with    Abdominal Pain     Pt from home, via [facility], states she took prilosec at 6AM and now her abdomen hurts. States had N/V X4 but nothing since 10 AM        Brief History: Patient is a 79 yo female with hx HTN, SVT, pancreatic cancer, hypothyroidism. She presented to hospital with epigastric/RUQ abdominal pain associated with n/v.     Procedure(s) 10/18/2020 by Dr Octavio Pitts:  ERCP STENT REMOVAL/EXCHANGE  ERCP BIOPSY    Procedure(s) 10/21/2020 by Dr Octavio Pitts:  ERCP ENDOSCOPIC RETROGRADE CHOLANGIOPANCREATOGRAPHY  EGD BILIARY STENT REMOVAL  EGD DUODENAL STENT PLACEMENT 22MMX 90 MM WALLFLEX  EGD DUODENAL DILATION WITH 20 MM  BALLOON    Subjective:  Presently resting in bed. She just returned from endoscopy. Patient arousable but drowsy. Remains on O2 at 2 liters. ROS unable to be performed secondary sedation.     Reviewed interval ancillary notes    Current Medications  furosemide (LASIX) injection 20 mg, BID  0.9 % sodium chloride infusion, Continuous  HYDROmorphone (DILAUDID) injection 0.5 mg, Q5 Min PRN  oxyCODONE-acetaminophen (PERCOCET) 5-325 MG per tablet 1 tablet, Once PRN  ondansetron (ZOFRAN) injection 4 mg, Once PRN  labetalol (NORMODYNE;TRANDATE) injection 5 mg, Q10 Min PRN  hydrALAZINE (APRESOLINE) injection 5 mg, Q10 Min PRN  meperidine (DEMEROL) injection 12.5 mg, Q5 Min PRN  0.9 % sodium chloride infusion, Continuous  perflutren lipid microspheres (DEFINITY) injection 1.65 mg, ONCE PRN  dilTIAZem (CARDIZEM) tablet 30 mg, 3 times per day  sodium chloride flush 0.9 % injection 10 mL, 2 times per day  HYDROmorphone HCl PF (DILAUDID) injection 0.5 mg, Once  meropenem (MERREM) 1 g in sodium chloride 0.9 % 100 mL IVPB (mini-bag), Q12H  lactated ringers infusion, Continuous  metoprolol tartrate (LOPRESSOR) tablet 12.5 mg, BID  acetaminophen (TYLENOL) tablet 650 mg, Q6H PRN    Or  acetaminophen (TYLENOL) suppository 650 mg, Q6H PRN  polyethylene glycol (GLYCOLAX) packet 17 g, Daily PRN  promethazine (PHENERGAN) tablet 12.5 mg, Q6H PRN    Or  ondansetron (ZOFRAN) injection 4 mg, Q6H PRN  enoxaparin (LOVENOX) injection 40 mg, Daily  potassium chloride (KLOR-CON M) extended release tablet 40 mEq, PRN    Or  potassium bicarb-citric acid (EFFER-K) effervescent tablet 40 mEq, PRN    Or  potassium chloride 10 mEq/100 mL IVPB (Peripheral Line), PRN  magnesium sulfate 2 g in 50 mL IVPB premix, PRN  levothyroxine (SYNTHROID) tablet 100 mcg, Daily  0.9 % sodium chloride infusion, Continuous  pantoprazole (PROTONIX) injection 40 mg, Daily      Objective:  /63   Pulse 86   Temp 97.6 °F (36.4 °C) (Temporal)   Resp 27   Ht 5' 6\" (1.676 m)   Wt 156 lb 14.4 oz (71.2 kg)   SpO2 96%   BMI 25.32 kg/m²     Intake/Output Summary (Last 24 hours) at 10/21/2020 1604  Last data filed at 10/21/2020 1554  Gross per 24 hour   Intake 800 ml   Output 160 ml   Net 640 ml      Wt Readings from Last 3 Encounters:   10/21/20 156 lb 14.4 oz (71.2 kg)   06/09/19 145 lb (65.8 kg)   12/10/18 145 lb (65.8 kg)     General:  Asleep, arouses to stimuli, remains drowsy  Skin:  Warm and dry, color jaundiced  Chest:  Normal effort.   No use of accessory muscles, O2 sats low 90s on 2 liters  Cardiovascular:  Sinus rhythm on monitr  Abdomen:  Soft  Extremities:  No edema  Neurological: Drowsy        Labs and Tests:  CBC:   Recent Labs     10/19/20  0527 10/20/20  0536 10/21/20  0635   WBC 12.3* 8.2 10.8   HGB 12.6 13.0 13.2    149 161     BMP:    Recent Labs     10/19/20  0527 10/20/20  1211 10/21/20  0635    139 136   K 4.4 3.6 3.8    103 103   CO2 23 26 23   BUN 34* 22* 17   CREATININE 1.0 0.8 <0.5*   GLUCOSE 118* 189* 119*     Hepatic:   Recent Labs     10/19/20  0527 10/20/20  1211 10/21/20  0635   AST 46* 44* 52*   ALT 79* 55* 61*   BILITOT 2.1* 1.6* 6.5*   ALKPHOS 274* 331* 533*       CT Abdomen/Pelvis 10/16/2020: Interval development of moderate intra and extrahepatic biliary dilation,    increased pneumobilia and pancreatic ductal dilation.  This likely represents    malfunction of the distal common bile duct stent.  Recommend GI consultation.         Gastric and hiatal hernia distention with fluid.  There is transition near    the duodenal junction with poor definition of the duodenum.  This could be    reactive with resultant relative gastric outlet obstruction.         Cholelithiasis with mild gallbladder distention with fluid, air and mild    surrounding pericholecystic edema possible cholecystitis.         Gross deformity with severe canal stenosis L2-3 with prior compression    fractures and vertebral augmentation.  This is unchanged.             PCXR 10/19/2020:  Right greater than left bibasilar pulmonary opacities with possible trace    bilateral effusions.  In the setting of cardiomegaly this may be on the basis    of pulmonary edema/positive fluid balance, though multilobar infection with    parapneumonic effusions is a consideration in the appropriate clinical    setting. CXR 10/20/2020:  Small right-sided pleural effusion and right basilar airspace disease. Findings slightly increased compared to prior study.         Mild increased interstitial opacities which may be related bronchovascular    crowding from low lung volumes versus developing edema. CT Abdomen/Pelvis 10/20/2020:    Interval common bile duct stent exchange, with the new stent folded in the    common bile duct and the distal portion terminating within the 2nd portion of    the duodenum.         Extensive pneumobilia with ongoing intra and extrahepatic bile duct    dilatation.         Heterogeneous hepatic parenchymal enhancement may relate to cholangitis, as    described on operative report.  No peter hepatic abscess.         Features of volume overload, with small volume abdominal ascites, bilateral    pleural effusions and mild body wall anasarca. Echo 10/16/2020:  Summary   - Poor quality study due to incomplete visualization   - There is atrial fibrillation. Normal left ventricle size, wall thickness and systolic function with an   estimated ejection fraction of 50%. Grade II diastolic dysfunction. Moderate mitral regurgitation. The aortic valve is thickened/calcified with decreased leaflet mobility   consistent with aortic stenosis. Mild aortic stenosis and Moderate aortic regurgitation. The ascending aorta is dilated measuring 4.6 cm. Right ventricle appears mildly dilated. elevated RA pressure (15 mmHg). Estimated pulmonary artery systolic pressure is elevated at 52 mmHg assuming   a right atrial pressure of 15 mmHg. Problem List  Active Problems:    Pancreatic cancer ([address]    Biliary stent obstruction, initial encounter    Pneumobilia    Bacteremia due to Klebsiella pneumoniae    Streptococcal bacteremia    Essential hypertension    Transaminitis    Jaundice  Resolved Problems:    * No resolved hospital problems. *       Assessment & Plan:   1. Abdominal pain. Hx pancreatic cancer (dx 6/2019). S/p ERCP with biliary stent placement 6/2019, no follow up or treatment since. CT scan this admission concerning for malfunction of stent, concern for gastric outlet obstruction as well. ERCP with stent placement 10/18. CT scan yesterday consistent with duodenal obstruction and biliary stent obstruction. Repeat ERCP/EGD today (10/21) with biliary stent removal and duodenal stent placement. Live enzymes trending down. Surgery has signed off. GI on case. 2. Bacteremia. Blood cultures + for Klebsiella and Streptococcus. Believed source is biliary sepsis secondary to obstructed/migrated biliary stent. Initially started on Zosyn, transitioned to meropenem (10/18),  WBC has normalized, patient afebrile. Remains on meropenem. ID on case. 3. Volume overload. proBNP 10,500 (10/19), CT scan (10/20) with evidence of volume overload including bilateral pleural effusions, small volume abdominal ascites, and mild anasarca. Echo today (10/21) with EF 50% and grade II diastolic dysfunction, PA systolic 52 mmHg. Likely secondary to IV fluids, low albumin. Will hold on further Lasix until patient taking po fluids. Follow renal numbers. 4. Dilated ascending aorta. Measures 4.6 cm on echo. Will need referral to vascular as out-patient once she recovers from acute issues. 5. Hypotension. Resolved. Stable with resumption of diltiazem. Continue to follow. 6. Hypothyroidism. Continue levothyroxine. TSH 1.55.   7. Deconditioning. PT/OT recommend SNF on DC for rehab. Case management consulted for DC planning. Disposition: PT/OT have evaluated, patient scored 12/24 on short mobility form, 14/24 on ADL form. Recommend SNF for rehab on DC, unsafe for patient to be alone, will need 24/7 assistance. Case management assisting with DC planning. Diet: Diet NPO Effective Now Exceptions are: Sips with Meds  Code:Full Code  DVT PPX: enoxaparin.        AME Alva CNP   10/21/2020 4:04 PM
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
## 2020-10-21 NOTE — ANESTHESIA POSTPROCEDURE EVALUATION
Department of Anesthesiology  Postprocedure Note    Patient: Kimberli Elise  MRN: [id number]  YOB: 1928  Date of evaluation: 10/21/2020  Time:  3:55 PM     Procedure Summary     Date:  10/21/20 Room / Location:  [address]    Anesthesia Start:  4293 Anesthesia Stop:  1527    Procedures:       ERCP ENDOSCOPIC RETROGRADE CHOLANGIOPANCREATOGRAPHY (N/A )      EGD BILIARY STENT REMOVAL      EGD DUODENAL STENT PLACEMENT 22MMX 80 MM WALLFLEX (N/A Abdomen)      EGD DUODENAL DILATION WITH 20 MM  BALLOON (N/A Abdomen) Diagnosis:  (biliary stent obstruction)    Surgeon:  Robin Smith MD Responsible Provider:  So Da Silva MD    Anesthesia Type:  general ASA Status:  3          Anesthesia Type: general    Farrah Phase I: Farrah Score: 9    Farrah Phase II:      Last vitals: Reviewed and per EMR flowsheets.        Anesthesia Post Evaluation    Patient location during evaluation: PACU  Patient participation: complete - patient participated  Level of consciousness: awake  Airway patency: patent  Nausea & Vomiting: no nausea and no vomiting  Complications: no  Cardiovascular status: blood pressure returned to baseline  Respiratory status: acceptable  Hydration status: euvolemic

## 2020-10-22 LAB
A/G RATIO: 0.7 (ref 1.1–2.2)
ALBUMIN SERPL-MCNC: 2.2 G/DL (ref 3.4–5)
ALP BLD-CCNC: 468 U/L (ref 40–129)
ALT SERPL-CCNC: 52 U/L (ref 10–40)
ANION GAP SERPL CALCULATED.3IONS-SCNC: 10 MMOL/L (ref 3–16)
APTT: 50.6 SEC (ref 24.2–36.2)
AST SERPL-CCNC: 49 U/L (ref 15–37)
BASOPHILS ABSOLUTE: 0 K/UL (ref 0–0.2)
BASOPHILS RELATIVE PERCENT: 0.2 %
BILIRUB SERPL-MCNC: 8.5 MG/DL (ref 0–1)
BUN BLDV-MCNC: 18 MG/DL (ref 7–20)
CALCIUM SERPL-MCNC: 7.9 MG/DL (ref 8.3–10.6)
CHLORIDE BLD-SCNC: 103 MMOL/L (ref 99–110)
CO2: 23 MMOL/L (ref 21–32)
CREAT SERPL-MCNC: <0.5 MG/DL (ref 0.6–1.2)
EKG ATRIAL RATE: 182 BPM
EKG DIAGNOSIS: NORMAL
EKG Q-T INTERVAL: 364 MS
EKG QRS DURATION: 74 MS
EKG QTC CALCULATION (BAZETT): 464 MS
EKG R AXIS: 24 DEGREES
EKG T AXIS: 9 DEGREES
EKG VENTRICULAR RATE: 98 BPM
EOSINOPHILS ABSOLUTE: 0 K/UL (ref 0–0.6)
EOSINOPHILS RELATIVE PERCENT: 0.5 %
GFR AFRICAN AMERICAN: >60
GFR NON-AFRICAN AMERICAN: >60
GLOBULIN: 3.3 G/DL
GLUCOSE BLD-MCNC: 76 MG/DL (ref 70–99)
HCT VFR BLD CALC: 39.2 % (ref 36–48)
HEMOGLOBIN: 13.2 G/DL (ref 12–16)
LIPASE: 465 U/L (ref 13–60)
LYMPHOCYTES ABSOLUTE: 0.7 K/UL (ref 1–5.1)
LYMPHOCYTES RELATIVE PERCENT: 7 %
MCH RBC QN AUTO: 30.8 PG (ref 26–34)
MCHC RBC AUTO-ENTMCNC: 33.6 G/DL (ref 31–36)
MCV RBC AUTO: 91.7 FL (ref 80–100)
MONOCYTES ABSOLUTE: 0.7 K/UL (ref 0–1.3)
MONOCYTES RELATIVE PERCENT: 6.8 %
NEUTROPHILS ABSOLUTE: 8.2 K/UL (ref 1.7–7.7)
NEUTROPHILS RELATIVE PERCENT: 85.5 %
PDW BLD-RTO: 13.2 % (ref 12.4–15.4)
PLATELET # BLD: 182 K/UL (ref 135–450)
PMV BLD AUTO: 10.1 FL (ref 5–10.5)
POTASSIUM SERPL-SCNC: 4.1 MMOL/L (ref 3.5–5.1)
RBC # BLD: 4.28 M/UL (ref 4–5.2)
SODIUM BLD-SCNC: 136 MMOL/L (ref 136–145)
TOTAL PROTEIN: 5.5 G/DL (ref 6.4–8.2)
WBC # BLD: 9.6 K/UL (ref 4–11)

## 2020-10-22 PROCEDURE — 6360000002 HC RX W HCPCS: Performed by: INTERNAL MEDICINE

## 2020-10-22 PROCEDURE — 83690 ASSAY OF LIPASE: CPT

## 2020-10-22 PROCEDURE — 85025 COMPLETE CBC W/AUTO DIFF WBC: CPT

## 2020-10-22 PROCEDURE — 93010 ELECTROCARDIOGRAM REPORT: CPT | Performed by: INTERNAL MEDICINE

## 2020-10-22 PROCEDURE — 85730 THROMBOPLASTIN TIME PARTIAL: CPT

## 2020-10-22 PROCEDURE — 36415 COLL VENOUS BLD VENIPUNCTURE: CPT

## 2020-10-22 PROCEDURE — 99223 1ST HOSP IP/OBS HIGH 75: CPT | Performed by: INTERNAL MEDICINE

## 2020-10-22 PROCEDURE — 80053 COMPREHEN METABOLIC PANEL: CPT

## 2020-10-22 PROCEDURE — 2580000003 HC RX 258: Performed by: INTERNAL MEDICINE

## 2020-10-22 PROCEDURE — 6370000000 HC RX 637 (ALT 250 FOR IP): Performed by: NURSE PRACTITIONER

## 2020-10-22 PROCEDURE — 93005 ELECTROCARDIOGRAM TRACING: CPT | Performed by: NURSE PRACTITIONER

## 2020-10-22 PROCEDURE — 1200000000 HC SEMI PRIVATE

## 2020-10-22 PROCEDURE — 99233 SBSQ HOSP IP/OBS HIGH 50: CPT | Performed by: INTERNAL MEDICINE

## 2020-10-22 PROCEDURE — 6360000002 HC RX W HCPCS: Performed by: NURSE PRACTITIONER

## 2020-10-22 PROCEDURE — 6370000000 HC RX 637 (ALT 250 FOR IP): Performed by: INTERNAL MEDICINE

## 2020-10-22 PROCEDURE — 2500000003 HC RX 250 WO HCPCS: Performed by: INTERNAL MEDICINE

## 2020-10-22 PROCEDURE — C9113 INJ PANTOPRAZOLE SODIUM, VIA: HCPCS | Performed by: INTERNAL MEDICINE

## 2020-10-22 RX ORDER — MORPHINE SULFATE 2 MG/ML
2 INJECTION, SOLUTION INTRAMUSCULAR; INTRAVENOUS
Status: DISCONTINUED | OUTPATIENT
Start: 2020-10-22 | End: 2020-10-27 | Stop reason: HOSPADM

## 2020-10-22 RX ORDER — HEPARIN SODIUM 1000 [USP'U]/ML
60 INJECTION, SOLUTION INTRAVENOUS; SUBCUTANEOUS ONCE
Status: COMPLETED | OUTPATIENT
Start: 2020-10-22 | End: 2020-10-22

## 2020-10-22 RX ORDER — DOCOSANOL 100 MG/G
CREAM TOPICAL
Status: DISCONTINUED | OUTPATIENT
Start: 2020-10-22 | End: 2020-10-27 | Stop reason: HOSPADM

## 2020-10-22 RX ORDER — FUROSEMIDE 10 MG/ML
20 INJECTION INTRAMUSCULAR; INTRAVENOUS DAILY
Status: DISCONTINUED | OUTPATIENT
Start: 2020-10-22 | End: 2020-10-27 | Stop reason: HOSPADM

## 2020-10-22 RX ORDER — HEPARIN SODIUM 1000 [USP'U]/ML
30 INJECTION, SOLUTION INTRAVENOUS; SUBCUTANEOUS PRN
Status: DISCONTINUED | OUTPATIENT
Start: 2020-10-22 | End: 2020-10-22

## 2020-10-22 RX ORDER — HEPARIN SODIUM 10000 [USP'U]/100ML
12 INJECTION, SOLUTION INTRAVENOUS CONTINUOUS
Status: DISCONTINUED | OUTPATIENT
Start: 2020-10-22 | End: 2020-10-22

## 2020-10-22 RX ORDER — HEPARIN SODIUM 1000 [USP'U]/ML
60 INJECTION, SOLUTION INTRAVENOUS; SUBCUTANEOUS PRN
Status: DISCONTINUED | OUTPATIENT
Start: 2020-10-22 | End: 2020-10-22

## 2020-10-22 RX ADMIN — ACETAMINOPHEN 650 MG: 325 TABLET ORAL at 15:41

## 2020-10-22 RX ADMIN — ENOXAPARIN SODIUM 40 MG: 40 INJECTION SUBCUTANEOUS at 08:20

## 2020-10-22 RX ADMIN — METOPROLOL TARTRATE 12.5 MG: 25 TABLET, FILM COATED ORAL at 21:08

## 2020-10-22 RX ADMIN — METOPROLOL TARTRATE 12.5 MG: 25 TABLET, FILM COATED ORAL at 08:20

## 2020-10-22 RX ADMIN — Medication 10 ML: at 21:11

## 2020-10-22 RX ADMIN — DILTIAZEM HYDROCHLORIDE 30 MG: 30 TABLET, FILM COATED ORAL at 05:54

## 2020-10-22 RX ADMIN — ACETAMINOPHEN 650 MG: 325 TABLET ORAL at 22:37

## 2020-10-22 RX ADMIN — HEPARIN SODIUM 12 UNITS/KG/HR: 10000 INJECTION, SOLUTION INTRAVENOUS at 10:56

## 2020-10-22 RX ADMIN — METRONIDAZOLE 500 MG: 500 INJECTION, SOLUTION INTRAVENOUS at 15:42

## 2020-10-22 RX ADMIN — MEROPENEM 1 G: 1 INJECTION, POWDER, FOR SOLUTION INTRAVENOUS at 08:21

## 2020-10-22 RX ADMIN — CEFTRIAXONE 2 G: 2 INJECTION, POWDER, FOR SOLUTION INTRAMUSCULAR; INTRAVENOUS at 17:53

## 2020-10-22 RX ADMIN — Medication 10 ML: at 08:43

## 2020-10-22 RX ADMIN — DOCOSANOL: 100 CREAM TOPICAL at 21:10

## 2020-10-22 RX ADMIN — METRONIDAZOLE 500 MG: 500 INJECTION, SOLUTION INTRAVENOUS at 21:08

## 2020-10-22 RX ADMIN — LEVOTHYROXINE SODIUM 100 MCG: 0.1 TABLET ORAL at 05:54

## 2020-10-22 RX ADMIN — PANTOPRAZOLE SODIUM 40 MG: 40 INJECTION, POWDER, FOR SOLUTION INTRAVENOUS at 08:20

## 2020-10-22 RX ADMIN — MORPHINE SULFATE 2 MG: 2 INJECTION, SOLUTION INTRAMUSCULAR; INTRAVENOUS at 21:08

## 2020-10-22 RX ADMIN — HEPARIN SODIUM 4550 UNITS: 1000 INJECTION INTRAVENOUS; SUBCUTANEOUS at 10:56

## 2020-10-22 RX ADMIN — DILTIAZEM HYDROCHLORIDE 60 MG: 30 TABLET, FILM COATED ORAL at 21:08

## 2020-10-22 RX ADMIN — FUROSEMIDE 20 MG: 10 INJECTION, SOLUTION INTRAMUSCULAR; INTRAVENOUS at 10:45

## 2020-10-22 RX ADMIN — ACETAMINOPHEN 650 MG: 325 TABLET ORAL at 00:54

## 2020-10-22 ASSESSMENT — ENCOUNTER SYMPTOMS
FACIAL SWELLING: 0
EYE REDNESS: 0
TROUBLE SWALLOWING: 0
APNEA: 0
BLOOD IN STOOL: 0
DIARRHEA: 0
NAUSEA: 0
SHORTNESS OF BREATH: 0
COUGH: 0
CHEST TIGHTNESS: 0
RHINORRHEA: 0
STRIDOR: 0
EYE DISCHARGE: 0
CHOKING: 0
ABDOMINAL PAIN: 0
PHOTOPHOBIA: 0
COLOR CHANGE: 0

## 2020-10-22 ASSESSMENT — PAIN SCALES - GENERAL
PAINLEVEL_OUTOF10: 0
PAINLEVEL_OUTOF10: 5
PAINLEVEL_OUTOF10: 5
PAINLEVEL_OUTOF10: 0
PAINLEVEL_OUTOF10: 0
PAINLEVEL_OUTOF10: 5
PAINLEVEL_OUTOF10: 5
PAINLEVEL_OUTOF10: 0

## 2020-10-22 ASSESSMENT — PAIN DESCRIPTION - FREQUENCY: FREQUENCY: INTERMITTENT

## 2020-10-22 ASSESSMENT — PAIN DESCRIPTION - PAIN TYPE: TYPE: ACUTE PAIN

## 2020-10-22 ASSESSMENT — PAIN DESCRIPTION - LOCATION: LOCATION: ABDOMEN

## 2020-10-22 ASSESSMENT — PAIN DESCRIPTION - ORIENTATION: ORIENTATION: MID

## 2020-10-22 NOTE — PROGRESS NOTES
Daughter called twice this AM at shift change, spoke with monitor tech. RN returned phone call @ [phone], voicemail left.  Will monitor

## 2020-10-22 NOTE — PLAN OF CARE
Problem: Falls - Risk of:  Goal: Will remain free from falls  Description: Will remain free from falls  Outcome: Ongoing  Note: Pt remains free of falls. Fall precautions in place per protocol. Bed/chair alarms in place. Call light within reach, pt able to call appropriately. Will monitor. Problem: Pain:  Goal: Control of acute pain  Description: Control of acute pain  Outcome: Ongoing  Note: Pt c/o abd pain 5/10 in between lunch and dinner. Tylenol given for pain. Pt tolerated a full liquid diet dinner, denies abd pain at this time 0/10. Will continue to monitor. Problem: Skin Integrity:  Goal: Absence of new skin breakdown  Description: Absence of new skin breakdown  Outcome: Ongoing  Note: Skin tear d/t PIV tape. Site cleansed, ointment and foam pad applied. Will continue to monitor.
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
## 2020-10-22 NOTE — CONSULTS
10/21/2020); and Upper gastrointestinal endoscopy (N/A, 10/21/2020). Advance Directives:  Full Code         Problem Severity: Pain/Other Symptoms:  RUQ pain        Bed Mobility/Toileting/Transfer:    Was stand by assist prior to admit. Performance Status:        Palliative Performance Scale:  100% []Full Normal activity & work No evidence of disease  90%   [] Full Normal activity & work Some evidence of disease  80%   [] Full Normal activity with Effort Some evidence of disease  70%   [] Reduced Unable Normal Job/Work Significant disease Full Normal or reduced  60%   [] Ambulation reduced; Significant disease; Can't do hobbies/housework; intake normal   or reduced; occasional assist; LOC full/confusion  50%   [] Mainly sit/lie; Extensive disease; Can't do any work; Considerable assist; intake normal  Or reduced; LOC full/confusion  40%   [x] Mainly in bed; Extensive disease; Mainly assist; intake normal or reduced; occasional assist; LOC full/confusion  30%   [] Bed Bound; Extensive disease; Total care; intake reduced; LOC full/confusion  20%   [] Bed Bound; Extensive disease; Total care; intake minimal; Drowsy/coma  10%   [] Bed Bound; Extensive disease; Total care; Mouth care only; Drowsy/coma    PPS 40%     Symptom Assessment: Appetite/Nausea/Bowels/Fatigue:     lbs. BMI 26.99.   Current Albumin 2.2  NPO. NG in place on admit to decompress. Social History:   reports that she has never smoked. She has never used smokeless tobacco. She reports that she does not drink alcohol or use drugs. Family History:  family history is not on file. Psychological/Spiritual:  . Two Children. Yarsani Islam         Family Discussion:    Patient resting peacefully at this time. Call out to both children Yamilka Govea and Emmanuel Wayne. LM with Alison. Consulted with Emmanuel Wayne via phone with this conversation. His number is: [phone]. Discussed overall clinical status of patient.  Both children considering options of Aggressive care vs. Palliative. Agree to meet with me in AM of 23rd @ 1030 to present hospice philosophy as an option in comfort for patient. Discussed Advance Directives/Code. Education provided on all variables. Son is aware of full code status now and will discuss further this PM on changing her status possibly tomorrow after our meeting. Discussed Scientology. Patient is [place]. Has a strong delvin background. Family do not wish to have  in for spiritual support at this time. Will meet with family in AM to offer further education/emotional support. Will follow as patient/family need. No order changes at this time.

## 2020-10-22 NOTE — PROGRESS NOTES
[facility] GI  Gastroenterology Progress Note  Kaitlin Nicole is a 80 y.o. female patient. 1. Biliary stent obstruction, initial encounter    2. Malignant neoplasm of pancreas, unspecified location of malignancy ([address]    3. Pneumobilia        SUBJECTIVE:  Tolerated clears this AM.  Denies ab pain, n/v.      Physical    VITALS:  BP (!) 140/73   Pulse 108   Temp 97.6 °F (36.4 °C) (Oral)   Resp 20   Ht 5' 6\" (1.676 m)   Wt 167 lb 3.2 oz (75.8 kg)   SpO2 99%   BMI 26.99 kg/m²   TEMPERATURE:  Current - Temp: 97.6 °F (36.4 °C); Max - Temp  Av °F (36.7 °C)  Min: 97 °F (36.1 °C)  Max: 98.6 °F (37 °C)    Abdomen soft, ND, NT, no HSM, Bowel sounds normal   + icteric and jaundiced  nad    Data      Recent Labs     10/20/20  0536 10/21/20  0635 10/22/20  0555   WBC 8.2 10.8 9.6   HGB 13.0 13.2 13.2   HCT 39.4 39.6 39.2   MCV 92.0 91.6 91.7    161 182     Recent Labs     10/20/20  1211 10/21/20  0635 10/22/20  0555    136 136   K 3.6 3.8 4.1    103 103   CO2 26 23 23   BUN 22* 17 18   CREATININE 0.8 <0.5* <0.5*     Recent Labs     10/20/20  1211 10/21/20  0635 10/22/20  0555   AST 44* 52* 49*   ALT 55* 61* 52*   BILITOT 1.6* 6.5* 8.5*   ALKPHOS 331* 533* 468*     Recent Labs     10/20/20  1211 10/21/20  0635 10/22/20  0555   LIPASE 900.0* 734.0* 465.0*             ASSESSMENT   1. Cholangitis- s/p removal of biliary stents due to need to place duodenal stent for duodenal stenosis. Bili rising now. She is afebrile and wbc is normal.    3. 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 2. Pancreatic cancer  3. Bacteremia- as noted in ID note        PLAN    1. Since the bilirubin is now increasing again, if cont to rise again tomorrow will need PTC for biliary drainage. This may be able to be internalized at a later time once duodenal stent has time to expand. 2. Will need to continue abx for cholangitis until after definitive biliary drainage is achieved.    3. Will try to advance to full liq diet later today. Would not advance beyond this as vegetable matter will occlude the duodenal stent.       Delmon Lefort, MD  [address] [facility]

## 2020-10-22 NOTE — CARE COORDINATION
Patient's son- Patience Clement  requested to get information  regarding palliative care and possible transitioning to hospice care. Palliative care  order was obtained and Palliative care Nurse consulted.

## 2020-10-22 NOTE — PROGRESS NOTES
[facility]    10/22/2020 9:18 AM        Name: Luz Elena Colbert . Admitted: 10/16/2020  Primary Care Provider: Jeremy Tejeda MD (Tel: [phone])      Chief Complaint   Patient presents with    Abdominal Pain     Pt from home, via [facility], states she took prilosec at 6AM and now her abdomen hurts. States had N/V X4 but nothing since 10 AM        Brief History: Patient is a 81 yo female with hx HTN, SVT, pancreatic cancer, hypothyroidism. She presented to hospital with epigastric/RUQ abdominal pain associated with n/v.     Procedure(s) 10/18/2020 by Dr Phillip Mcdowell:  ERCP STENT REMOVAL/EXCHANGE  ERCP BIOPSY    Procedure(s) 10/21/2020 by Dr Phillip Mcdowell:  ERCP ENDOSCOPIC RETROGRADE CHOLANGIOPANCREATOGRAPHY  EGD BILIARY STENT REMOVAL  EGD DUODENAL STENT PLACEMENT 22MMX 90 MM WALLFLEX  EGD DUODENAL DILATION WITH 20 MM  BALLOON    Subjective:  Presently resting in bed. States she feels much better today. Denies chest pain, shortness of breath, abdominal pain, nausea, O2 sats mid to high 90s room air.      Reviewed interval ancillary notes    Current Medications  furosemide (LASIX) injection 20 mg, Daily  perflutren lipid microspheres (DEFINITY) injection 1.65 mg, ONCE PRN  dilTIAZem (CARDIZEM) tablet 30 mg, 3 times per day  sodium chloride flush 0.9 % injection 10 mL, 2 times per day  HYDROmorphone HCl PF (DILAUDID) injection 0.5 mg, Once  meropenem (MERREM) 1 g in sodium chloride 0.9 % 100 mL IVPB (mini-bag), Q12H  metoprolol tartrate (LOPRESSOR) tablet 12.5 mg, BID  acetaminophen (TYLENOL) tablet 650 mg, Q6H PRN    Or  acetaminophen (TYLENOL) suppository 650 mg, Q6H PRN  polyethylene glycol (GLYCOLAX) packet 17 g, Daily PRN  promethazine (PHENERGAN) tablet 12.5 mg, Q6H PRN    Or  ondansetron (ZOFRAN) injection 4 mg, Q6H PRN  enoxaparin (LOVENOX) injection 40 mg, Daily  potassium chloride (KLOR-CON M) extended release tablet 40 mEq, PRN    Or  potassium bicarb-citric acid (EFFER-K) effervescent tablet 40 mEq, PRN    Or  potassium chloride 10 mEq/100 mL IVPB (Peripheral Line), PRN  magnesium sulfate 2 g in 50 mL IVPB premix, PRN  levothyroxine (SYNTHROID) tablet 100 mcg, Daily  pantoprazole (PROTONIX) injection 40 mg, Daily      Objective:  BP (!) 140/73   Pulse 108   Temp 97.6 °F (36.4 °C) (Oral)   Resp 20   Ht 5' 6\" (1.676 m)   Wt 167 lb 3.2 oz (75.8 kg)   SpO2 99%   BMI 26.99 kg/m²     Intake/Output Summary (Last 24 hours) at 10/22/2020 0918  Last data filed at 10/21/2020 1938  Gross per 24 hour   Intake 800 ml   Output 210 ml   Net 590 ml      Wt Readings from Last 3 Encounters:   10/22/20 167 lb 3.2 oz (75.8 kg)   06/09/19 145 lb (65.8 kg)   12/10/18 145 lb (65.8 kg)       General:  Awake, alert, oriented in NAD  Skin:  Warm and dry. No unusual bruising or rash, jaundiced  Neck:  Supple. No JVD appreciated  Chest:  Normal effort. Diminished in bases, no wheezes  Cardiovascular:  Irregular, normal S1/S2, no murmur/gallop/rub  Abdomen:  Soft, nontender, +bowel sounds  Extremities:  No edema  Neurological: Moves all extremities  Psychological: Normal mood and affect      Labs and Tests:  CBC:   Recent Labs     10/20/20  0536 10/21/20  0635 10/22/20  0555   WBC 8.2 10.8 9.6   HGB 13.0 13.2 13.2    161 182     BMP:    Recent Labs     10/20/20  1211 10/21/20  0635 10/22/20  0555    136 136   K 3.6 3.8 4.1    103 103   CO2 26 23 23   BUN 22* 17 18   CREATININE 0.8 <0.5* <0.5*   GLUCOSE 189* 119* 76     Hepatic:   Recent Labs     10/20/20  1211 10/21/20  0635 10/22/20  0555   AST 44* 52* 49*   ALT 55* 61* 52*   BILITOT 1.6* 6.5* 8.5*   ALKPHOS 331* 533* 468*       CT Abdomen/Pelvis 10/16/2020:   Interval development of moderate intra and extrahepatic biliary dilation,    increased pneumobilia and pancreatic ductal dilation.  This likely represents    malfunction of the distal common bile duct stent.  Recommend GI consultation.         Gastric and hiatal hernia distention with fluid.  There is transition near    the duodenal junction with poor definition of the duodenum.  This could be    reactive with resultant relative gastric outlet obstruction.         Cholelithiasis with mild gallbladder distention with fluid, air and mild    surrounding pericholecystic edema possible cholecystitis.         Gross deformity with severe canal stenosis L2-3 with prior compression    fractures and vertebral augmentation.  This is unchanged.             PCXR 10/19/2020:  Right greater than left bibasilar pulmonary opacities with possible trace    bilateral effusions.  In the setting of cardiomegaly this may be on the basis    of pulmonary edema/positive fluid balance, though multilobar infection with    parapneumonic effusions is a consideration in the appropriate clinical    setting. CXR 10/20/2020:  Small right-sided pleural effusion and right basilar airspace disease. Findings slightly increased compared to prior study.         Mild increased interstitial opacities which may be related bronchovascular    crowding from low lung volumes versus developing edema. CT Abdomen/Pelvis 10/20/2020: Interval common bile duct stent exchange, with the new stent folded in the    common bile duct and the distal portion terminating within the 2nd portion of    the duodenum.         Extensive pneumobilia with ongoing intra and extrahepatic bile duct    dilatation.         Heterogeneous hepatic parenchymal enhancement may relate to cholangitis, as    described on operative report.  No peter hepatic abscess.         Features of volume overload, with small volume abdominal ascites, bilateral    pleural effusions and mild body wall anasarca. Echo 10/16/2020:  Summary   - Poor quality study due to incomplete visualization   - There is atrial fibrillation.    Normal left ventricle size, wall thickness and systolic function with an   estimated ejection fraction of 50%. Grade II diastolic dysfunction. Moderate mitral regurgitation. The aortic valve is thickened/calcified with decreased leaflet mobility   consistent with aortic stenosis. Mild aortic stenosis and Moderate aortic regurgitation. The ascending aorta is dilated measuring 4.6 cm. Right ventricle appears mildly dilated. elevated RA pressure (15 mmHg). Estimated pulmonary artery systolic pressure is elevated at 52 mmHg assuming   a right atrial pressure of 15 mmHg. Problem List  Active Problems:    Pancreatic cancer ([address]    Biliary stent obstruction, initial encounter    Pneumobilia    Bacteremia due to Klebsiella pneumoniae    Streptococcal bacteremia    Essential hypertension    Transaminitis    Jaundice    Ascending aorta dilation (HCC)  Resolved Problems:    * No resolved hospital problems. *       Assessment & Plan:   1. Abdominal pain. Hx pancreatic cancer (dx 6/2019). S/p ERCP with biliary stent placement 6/2019, no follow up or treatment since. CT scan this admission concerning for malfunction of stent, concern for gastric outlet obstruction as well. ERCP with stent placement 10/18. CT scan yesterday consistent with duodenal obstruction and biliary stent obstruction. Repeat ERCP/EGD 10/21 with biliary stent removal and duodenal stent placement. Patient reports she feels much better today, no abdominal pain, no nausea. Liver enzymes trending down. Surgery has signed off. GI on case. 2. Bacteremia. Blood cultures + for Klebsiella and Streptococcus. Believed source is biliary sepsis secondary to obstructed/migrated biliary stent. Initially started on Zosyn, transitioned to meropenem (10/18),  WBC has normalized, patient afebrile. Remains on meropenem. ID on case. 3. Volume overload.  proBNP 10,500 (10/19), CT scan (10/20) with evidence of volume overload including bilateral pleural effusions, small volume abdominal ascites,

## 2020-10-22 NOTE — CONSULTS
[facility]  Advanced CHF/Pulmonary Hypertension   Cardiac Evaluation      [address] [facility]  YOB: 1928    REquesting PHysician:  Hussein Lange      Chief Complaint   Patient presents with    Abdominal Pain     Pt from home, via [facility], states she took prilosec at 6AM and now her abdomen hurts. States had N/V X4 but nothing since 10 AM         History of Present Illness:  Sujata Mantilla is a 79 yo female who presented to the ED with abdominal pain. She has a history of stage I pancreatic cancer that was treated and felt to have been resolved. She presented with nausea and vomiting. We are consulted for atrial fibrillation that was noted on EKG a few days ago. She did not have an EKG on admission. Her heart rate has been increased. She tells me that she was diagnosed with atrial fibrillation many years ago and was started on diltiazem and metoprolol. She was told as long as she stayed on these meds, she would not have any problems. She has never been on anticoagulation. She also has an elevated BNP level of 10,500. Her admission reveals the following:    Admitted 10/16/20  10/18:  ERCP:    1) Distally migrated expandable metal mesh stent impacted in periampullary duodenum - removed. 2) Suppurative cholangitis                              3) Mildly dilated biliary tree with no obvious stenosis                              4) Indurated stenotic periampullary duodenum - biopsied                              5) New 10 Fr, 4 cm double-pigtail plastic biliary stent placement   Blood culture:  10/20/20:  Strep and klebsiella both positive  10/21/20:  Biliary stent removed  10/22/20:  Patient and family considering palliative care and hospice     Echo:  10/21/20:   - Poor quality study due to incomplete visualization   - There is atrial fibrillation.    Normal left ventricle size, wall thickness and systolic function with an   estimated ejection fraction of 50%. Grade II diastolic dysfunction. Moderate mitral regurgitation. The aortic valve is thickened/calcified with decreased leaflet mobility   consistent with aortic stenosis. Mild aortic stenosis and Moderate aortic regurgitation. The ascending aorta is dilated measuring 4.6 cm. Right ventricle appears mildly dilated. elevated RA pressure (15 mmHg). Estimated pulmonary artery systolic pressure is elevated at 52 mmHg assuming   a right atrial pressure of 15 mmHg. Labs:  BNP 10,500 10/19  Alk 468  ALT 52, AST 49  lipasxe 465    Meds Prior to admission  Diltiazem 240 mg po qd  Metoprolol 25 qd    EKG:  10/16:  Atrial fibrillation  EK19:  Likely atrial fibrillation    Today she is very fatigued. She denies shortness of breath. She has had scope with stent removal.  She is now on a heparin drip. [id number] Score for Atrial Fibrillation Stroke Risk   Risk   Factors  Component Value   C CHF No 0   H HTN Yes 1   A2 Age >= 76 Yes,  (80 y.o.) 2   D DM No 0   S2 Prior Stroke/TIA No 0   V Vascular Disease No 0   A Age 74-69 No,  (80 y.o.) 0   Sc Sex female 1    [id number]  Score  4   Score last updated 10/22/20 08:62 PM EDT    Click here for a link to the UpToDate guideline \"Atrial Fibrillation: Anticoagulation therapy to prevent embolization    Disclaimer: Risk Score calculation is dependent on accuracy of patient problem list and past encounter diagnosis.       Allergies   Allergen Reactions    Sulfa Antibiotics Nausea And Vomiting     Current Facility-Administered Medications   Medication Dose Route Frequency Provider Last Rate Last Dose    furosemide (LASIX) injection 20 mg  20 mg Intravenous Daily AME Campuzano CNP   20 mg at 10/22/20 1045    heparin (porcine) injection 4,550 Units  60 Units/kg Intravenous PRN AME Campuzano CNP        heparin (porcine) injection 2,270 Units  30 Units/kg Intravenous PRN AME Campuzano CNP       Leo Self heparin 25,000 units in dextrose 5% 250 mL infusion  12 Units/kg/hr Intravenous Continuous AME Small - CNP 9.1 mL/hr at 10/22/20 1056 12 Units/kg/hr at 10/22/20 1056    dilTIAZem (CARDIZEM) tablet 60 mg  60 mg Oral 3 times per day AME Small - CNP        morphine (PF) injection 2 mg  2 mg Intravenous Q3H PRN Virgilio Rubinstein, MD        cefTRIAXone (ROCEPHIN) 2 g IVPB in D5W 50ml minibag  2 g Intravenous Q24H Kevin Herbert MD        metronidazole (FLAGYL) 500 mg in NaCl 100 mL IVPB premix  500 mg Intravenous Q8H Kevin Herbert MD        perflutren lipid microspheres (DEFINITY) injection 1.65 mg  1.5 mL Intravenous ONCE PRN Virgilio Rubinstein, MD        sodium chloride flush 0.9 % injection 10 mL  10 mL Intravenous 2 times per day Virgilio Rubinstein, MD   10 mL at 10/22/20 0843    HYDROmorphone HCl PF (DILAUDID) injection 0.5 mg  0.5 mg Intravenous Once Virgilio Rubinstein, MD   Stopped at 10/18/20 0900    metoprolol tartrate (LOPRESSOR) tablet 12.5 mg  12.5 mg Oral BID Virgilio Rubinstein, MD   12.5 mg at 10/22/20 0820    acetaminophen (TYLENOL) tablet 650 mg  650 mg Oral Q6H PRN Virgilio Rubinstein, MD   650 mg at 10/22/20 0054    Or    acetaminophen (TYLENOL) suppository 650 mg  650 mg Rectal Q6H PRN Virgilio Rubinstein, MD        polyethylene glycol [facility]) packet 17 g  17 g Oral Daily PRN Virgilio Rubinstein, MD        promethazine (PHENERGAN) tablet 12.5 mg  12.5 mg Oral Q6H PRN Virgilio Rubinstein, MD   12.5 mg at 10/20/20 1558    Or    ondansetron (ZOFRAN) injection 4 mg  4 mg Intravenous Q6H PRN Virgilio Rubinstein, MD   4 mg at 10/20/20 1102    potassium chloride (KLOR-CON M) extended release tablet 40 mEq  40 mEq Oral PRN Virgilio Rubinstein, MD        Or    potassium bicarb-citric acid (EFFER-K) effervescent tablet 40 mEq  40 mEq Oral PRN Virgilio Rubinstein, MD        Or    potassium chloride 10 mEq/100 mL IVPB (Peripheral Line)  10 mEq Intravenous PRN Virgilio Rubinstein, MD       [facility] BALLOON performed by Eriberto Yeboah MD at [address] [place] [address]     History reviewed. No pertinent family history. Social History     Socioeconomic History    Marital status:      Spouse name: Not on file    Number of children: Not on file    Years of education: Not on file    Highest education level: Not on file   Occupational History    Not on file   Social Needs    Financial resource strain: Not on file    Food insecurity     Worry: Not on file     Inability: Not on file    Transportation needs     Medical: Not on file     Non-medical: Not on file   Tobacco Use    Smoking status: Never Smoker    Smokeless tobacco: Never Used   Substance and Sexual Activity    Alcohol use: No    Drug use: No    Sexual activity: Not on file   Lifestyle    Physical activity     Days per week: Not on file     Minutes per session: Not on file    Stress: Not on file   Relationships    Social connections     Talks on phone: Not on file     Gets together: Not on file     Attends [employer] service: Not on file     Active member of club or organization: Not on file     Attends meetings of clubs or organizations: Not on file     Relationship status: Not on file    Intimate partner violence     Fear of current or ex partner: Not on file     Emotionally abused: Not on file     Physically abused: Not on file     Forced sexual activity: Not on file   Other Topics Concern    Not on file   Social History Narrative    Not on file       Review of Systems:   · Constitutional: there has been no unanticipated weight loss. There's been no change in energy level, sleep pattern, or activity level. · Eyes: No visual changes or diplopia. No scleral icterus. · ENT: No Headaches, hearing loss or vertigo. No mouth sores or sore throat. · Cardiovascular: Reviewed in HPI  · Respiratory: No cough or wheezing, no sputum production. No hematemesis. · Gastrointestinal: No abdominal pain, appetite loss, blood in stools.  No change in bowel or bladder habits. · Genitourinary: No dysuria, trouble voiding, or hematuria. · Musculoskeletal:  No gait disturbance, weakness or joint complaints. · Integumentary: No rash or pruritis. · Neurological: No headache, diplopia, change in muscle strength, numbness or tingling. No change in gait, balance, coordination, mood, affect, memory, mentation, behavior. · Psychiatric: No anxiety, no depression. · Endocrine: No malaise, fatigue or temperature intolerance. No excessive thirst, fluid intake, or urination. No tremor. · Hematologic/Lymphatic: No abnormal bruising or bleeding, blood clots or swollen lymph nodes. · Allergic/Immunologic: No nasal congestion or hives. Physical Examination:    Vitals:    10/22/20 0545 10/22/20 0803 10/22/20 0809 10/22/20 1051   BP: (!) 146/89 (!) 140/73  116/74   Pulse: 91 108 108 98   Resp: 18 20 20   Temp: 97.9 °F (36.6 °C) 97.6 °F (36.4 °C)  98.1 °F (36.7 °C)   TempSrc: Oral Oral  Oral   SpO2: 94% 99%  93%   Weight: 167 lb 3.2 oz (75.8 kg)      Height:         Body mass index is 26.99 kg/m².      Wt Readings from Last 3 Encounters:   10/22/20 167 lb 3.2 oz (75.8 kg)   06/09/19 145 lb (65.8 kg)   12/10/18 145 lb (65.8 kg)     BP Readings from Last 3 Encounters:   10/22/20 116/74   10/21/20 127/60   10/18/20 (!) 109/59     Constitutional and General Appearance:   Chronically ill appearing  HEENT:  NC/AT, jaundiced  ENID  No problems with hearing  Skin:  Warm, dry  Respiratory:  · Normal excursion and expansion without use of accessory muscles  · Resp Auscultation: Normal breath sounds without dullness  Cardiovascular:  · The apical impulses not displaced  · Heart tones are crisp and normal  · Cervical veins are not engorged  · The carotid upstroke is normal in amplitude and contour without delay or bruit  · JVP less than 8 cm H2O  Irregularly irregular rhythm with nl S1 and S2 without m,r,g  · Peripheral pulses are symmetrical and full  · There is no clubbing, cyanosis of the extremities. · No edema  · Femoral Arteries: 2+ and equal  · Pedal Pulses: 2+ and equal   Neck:  · No thyromegaly  Abdomen:  · No masses or tenderness  · Liver/Spleen: No Abnormalities Noted  Neurological/Psychiatric:  · Alert and oriented in all spheres  · Moves all extremities well  · Exhibits normal gait balance and coordination  · No abnormalities of mood, affect, memory, mentation, or behavior are noted    Labs were reviewed including labs from other hospital systems through [facility]. Cardiac testing was reviewed including echos, nuclear scans, cardiac catheterization, including from other hospital systems through [facility]. Assessment:    1. Biliary stent obstruction, initial encounter    2. Malignant neoplasm of pancreas, unspecified location of malignancy ([facility] [address]    3. Pneumobilia     4. Atrial fibrillation, likely chronic with high Chads score, but with advanced age and diagnosis of cancer, starting anticoagulation is risky  5. Chronic diastolic HF  6. Elevated BNP level    Plan:  1. Continue metoprolol and cardizem to control afib rate  2. Recommend discontinuing IV heparin. The risk of being on anticoagulation outweighs the benefit. She has history of long standing atrial fibrillation and has not been on anticoagulation. 3.  Gentle diuresis and follow BNP level  4. Antibiotics for cholangitis  5. May need drainage of biliary tree if remains jaundiced. 6.  Palliative care consultation in process. Family is considering palliative care vs hospice given her pancreatic cancer diagnosis. High Complexity. Multiple complex problems. Discussed with patient's treatment team- hospitalist, Nurse,     Simone Posada ill, at risk of impending organ failure, needing  higher level of care/monitoring. I appreciate the opportunity of cooperating in the care of this patient.     Harris Sapp M.D., [facility]

## 2020-10-23 LAB
A/G RATIO: 0.7 (ref 1.1–2.2)
ALBUMIN SERPL-MCNC: 2.3 G/DL (ref 3.4–5)
ALP BLD-CCNC: 406 U/L (ref 40–129)
ALT SERPL-CCNC: 41 U/L (ref 10–40)
ANION GAP SERPL CALCULATED.3IONS-SCNC: 9 MMOL/L (ref 3–16)
AST SERPL-CCNC: 30 U/L (ref 15–37)
BASOPHILS ABSOLUTE: 0 K/UL (ref 0–0.2)
BASOPHILS RELATIVE PERCENT: 0.2 %
BILIRUB SERPL-MCNC: 4 MG/DL (ref 0–1)
BUN BLDV-MCNC: 15 MG/DL (ref 7–20)
CALCIUM SERPL-MCNC: 7.9 MG/DL (ref 8.3–10.6)
CHLORIDE BLD-SCNC: 102 MMOL/L (ref 99–110)
CO2: 26 MMOL/L (ref 21–32)
CREAT SERPL-MCNC: 0.5 MG/DL (ref 0.6–1.2)
EOSINOPHILS ABSOLUTE: 0.1 K/UL (ref 0–0.6)
EOSINOPHILS RELATIVE PERCENT: 1.4 %
GFR AFRICAN AMERICAN: >60
GFR NON-AFRICAN AMERICAN: >60
GLOBULIN: 3.2 G/DL
GLUCOSE BLD-MCNC: 137 MG/DL (ref 70–99)
HCT VFR BLD CALC: 36.8 % (ref 36–48)
HEMOGLOBIN: 12.3 G/DL (ref 12–16)
LIPASE: 268 U/L (ref 13–60)
LYMPHOCYTES ABSOLUTE: 0.5 K/UL (ref 1–5.1)
LYMPHOCYTES RELATIVE PERCENT: 6.4 %
MAGNESIUM: 1.9 MG/DL (ref 1.8–2.4)
MCH RBC QN AUTO: 30.1 PG (ref 26–34)
MCHC RBC AUTO-ENTMCNC: 33.5 G/DL (ref 31–36)
MCV RBC AUTO: 90 FL (ref 80–100)
MONOCYTES ABSOLUTE: 0.4 K/UL (ref 0–1.3)
MONOCYTES RELATIVE PERCENT: 5.6 %
NEUTROPHILS ABSOLUTE: 6.8 K/UL (ref 1.7–7.7)
NEUTROPHILS RELATIVE PERCENT: 86.4 %
PDW BLD-RTO: 13.1 % (ref 12.4–15.4)
PLATELET # BLD: 215 K/UL (ref 135–450)
PMV BLD AUTO: 10.1 FL (ref 5–10.5)
POTASSIUM SERPL-SCNC: 3.4 MMOL/L (ref 3.5–5.1)
RBC # BLD: 4.09 M/UL (ref 4–5.2)
SODIUM BLD-SCNC: 137 MMOL/L (ref 136–145)
TOTAL PROTEIN: 5.5 G/DL (ref 6.4–8.2)
WBC # BLD: 7.9 K/UL (ref 4–11)

## 2020-10-23 PROCEDURE — 6370000000 HC RX 637 (ALT 250 FOR IP): Performed by: INTERNAL MEDICINE

## 2020-10-23 PROCEDURE — 85025 COMPLETE CBC W/AUTO DIFF WBC: CPT

## 2020-10-23 PROCEDURE — 80053 COMPREHEN METABOLIC PANEL: CPT

## 2020-10-23 PROCEDURE — 97530 THERAPEUTIC ACTIVITIES: CPT

## 2020-10-23 PROCEDURE — 2500000003 HC RX 250 WO HCPCS: Performed by: INTERNAL MEDICINE

## 2020-10-23 PROCEDURE — 99232 SBSQ HOSP IP/OBS MODERATE 35: CPT | Performed by: INTERNAL MEDICINE

## 2020-10-23 PROCEDURE — 6360000002 HC RX W HCPCS: Performed by: NURSE PRACTITIONER

## 2020-10-23 PROCEDURE — 2580000003 HC RX 258: Performed by: INTERNAL MEDICINE

## 2020-10-23 PROCEDURE — 83735 ASSAY OF MAGNESIUM: CPT

## 2020-10-23 PROCEDURE — 1200000000 HC SEMI PRIVATE

## 2020-10-23 PROCEDURE — 6360000002 HC RX W HCPCS: Performed by: INTERNAL MEDICINE

## 2020-10-23 PROCEDURE — C9113 INJ PANTOPRAZOLE SODIUM, VIA: HCPCS | Performed by: INTERNAL MEDICINE

## 2020-10-23 PROCEDURE — 6370000000 HC RX 637 (ALT 250 FOR IP): Performed by: NURSE PRACTITIONER

## 2020-10-23 PROCEDURE — 99233 SBSQ HOSP IP/OBS HIGH 50: CPT | Performed by: INTERNAL MEDICINE

## 2020-10-23 PROCEDURE — 36415 COLL VENOUS BLD VENIPUNCTURE: CPT

## 2020-10-23 PROCEDURE — 83690 ASSAY OF LIPASE: CPT

## 2020-10-23 PROCEDURE — 97116 GAIT TRAINING THERAPY: CPT

## 2020-10-23 RX ORDER — DIPHENHYDRAMINE HCL 25 MG
25 TABLET ORAL EVERY 6 HOURS PRN
Status: DISCONTINUED | OUTPATIENT
Start: 2020-10-23 | End: 2020-10-27 | Stop reason: HOSPADM

## 2020-10-23 RX ADMIN — DOCOSANOL: 100 CREAM TOPICAL at 12:45

## 2020-10-23 RX ADMIN — POTASSIUM BICARBONATE 40 MEQ: 782 TABLET, EFFERVESCENT ORAL at 10:56

## 2020-10-23 RX ADMIN — ACETAMINOPHEN 650 MG: 325 TABLET ORAL at 23:28

## 2020-10-23 RX ADMIN — METRONIDAZOLE 500 MG: 500 INJECTION, SOLUTION INTRAVENOUS at 21:07

## 2020-10-23 RX ADMIN — DIPHENHYDRAMINE HYDROCHLORIDE 25 MG: 25 TABLET ORAL at 21:07

## 2020-10-23 RX ADMIN — METOPROLOL TARTRATE 12.5 MG: 25 TABLET, FILM COATED ORAL at 10:55

## 2020-10-23 RX ADMIN — DOCOSANOL: 100 CREAM TOPICAL at 22:48

## 2020-10-23 RX ADMIN — METRONIDAZOLE 500 MG: 500 INJECTION, SOLUTION INTRAVENOUS at 14:49

## 2020-10-23 RX ADMIN — METRONIDAZOLE 500 MG: 500 INJECTION, SOLUTION INTRAVENOUS at 05:53

## 2020-10-23 RX ADMIN — FUROSEMIDE 20 MG: 10 INJECTION, SOLUTION INTRAMUSCULAR; INTRAVENOUS at 10:56

## 2020-10-23 RX ADMIN — ACETAMINOPHEN 650 MG: 325 TABLET ORAL at 05:53

## 2020-10-23 RX ADMIN — Medication 10 ML: at 10:56

## 2020-10-23 RX ADMIN — CEFTRIAXONE 2 G: 2 INJECTION, POWDER, FOR SOLUTION INTRAMUSCULAR; INTRAVENOUS at 16:13

## 2020-10-23 RX ADMIN — DOCOSANOL: 100 CREAM TOPICAL at 18:11

## 2020-10-23 RX ADMIN — METOPROLOL TARTRATE 12.5 MG: 25 TABLET, FILM COATED ORAL at 21:07

## 2020-10-23 RX ADMIN — PANTOPRAZOLE SODIUM 40 MG: 40 INJECTION, POWDER, FOR SOLUTION INTRAVENOUS at 10:55

## 2020-10-23 RX ADMIN — DOCOSANOL: 100 CREAM TOPICAL at 14:53

## 2020-10-23 RX ADMIN — DILTIAZEM HYDROCHLORIDE 60 MG: 30 TABLET, FILM COATED ORAL at 21:07

## 2020-10-23 RX ADMIN — LEVOTHYROXINE SODIUM 100 MCG: 0.1 TABLET ORAL at 05:54

## 2020-10-23 RX ADMIN — DILTIAZEM HYDROCHLORIDE 60 MG: 30 TABLET, FILM COATED ORAL at 05:54

## 2020-10-23 RX ADMIN — DILTIAZEM HYDROCHLORIDE 60 MG: 30 TABLET, FILM COATED ORAL at 14:48

## 2020-10-23 ASSESSMENT — ENCOUNTER SYMPTOMS
SHORTNESS OF BREATH: 0
FACIAL SWELLING: 0
CHEST TIGHTNESS: 0
EYE REDNESS: 0
APNEA: 0
EYE DISCHARGE: 0
BLOOD IN STOOL: 0
COUGH: 0
DIARRHEA: 0
CHOKING: 0
TROUBLE SWALLOWING: 0
STRIDOR: 0
COLOR CHANGE: 0
NAUSEA: 0
ABDOMINAL PAIN: 0
PHOTOPHOBIA: 0
RHINORRHEA: 0

## 2020-10-23 ASSESSMENT — PAIN SCALES - GENERAL
PAINLEVEL_OUTOF10: 0
PAINLEVEL_OUTOF10: 4
PAINLEVEL_OUTOF10: 5
PAINLEVEL_OUTOF10: 0
PAINLEVEL_OUTOF10: 0

## 2020-10-23 NOTE — PROGRESS NOTES
[address]   Progress Note  CHF/Pulmonary Hypertension Cardiology    Chief complaint: We are following this patient for chronic diastolic HF, chronic atrial fibrillation  HPI:  Holger Vazquez is a 79 yo female who presented to the ED with abdominal pain. She has a history of stage I pancreatic cancer that was treated and felt to have been resolved. She presented with nausea and vomiting.       We are consulted for atrial fibrillation that was noted on EKG a few days ago. She did not have an EKG on admission. Her heart rate has been increased. She tells me that she was diagnosed with atrial fibrillation many years ago and was started on diltiazem and metoprolol. She was told as long as she stayed on these meds, she would not have any problems. She has never been on anticoagulation. She also has an elevated BNP level of 10,500.       Her admission reveals the following:     Admitted 10/16/20  10/18:  ERCP:    1) Distally migrated expandable metal mesh stent impacted in periampullary duodenum - removed.                              2) Suppurative cholangitis                              3) Mildly dilated biliary tree with no obvious stenosis                              4) Indurated stenotic periampullary duodenum - biopsied                              5) New 10 Fr, 4 cm double-pigtail plastic biliary stent placement   Blood culture:  10/20/20:  Strep and klebsiella both positive  10/21/20:  Biliary stent removed  10/22/20:  Patient and family considering palliative care and hospice                Echo:  10/21/20:   - Poor quality study due to incomplete visualization   - There is atrial fibrillation.   Normal left ventricle size, wall thickness and systolic function with an   estimated ejection fraction of 50%.    Grade II diastolic dysfunction.   Moderate mitral regurgitation.   The aortic valve is thickened/calcified with decreased leaflet mobility   consistent with aortic stenosis.  Ramsey Hernandes aortic stenosis and Moderate aortic regurgitation.   The ascending aorta is dilated measuring 4.6 cm.   Right ventricle appears mildly dilated.   elevated RA pressure (15 mmHg).  Estimated pulmonary artery systolic pressure is elevated at 52 mmHg assuming   a right atrial pressure of 15 mmHg.     Labs:  BNP 10,500 10/19  Alk 468  ALT 52, AST 49  lipasxe 465     Meds Prior to admission  Diltiazem 240 mg po qd  Metoprolol 25 qd     EKG:  10/16:  Atrial fibrillation  EK19:  Likely atrial fibrillation    ROS:  Today she continues in atrial fibrillation (chronic). She is fatigued. Bilirubin is slowly improving. Denies shortness of breath. GI is not planning to do stents today. Discussions this morning r.e. hospice/palliative care given her pancreatic cancer.     Medications/Labs all Reviewed    Lab Results   Component Value Date    WBC 7.9 10/23/2020    HGB 12.3 10/23/2020    HCT 36.8 10/23/2020    MCV 90.0 10/23/2020     10/23/2020     Lab Results   Component Value Date    CREATININE 0.5 (L) 10/23/2020    BUN 15 10/23/2020     10/23/2020    K 3.4 (L) 10/23/2020     10/23/2020    CO2 26 10/23/2020     Lab Results   Component Value Date    INR 1.35 (H) 10/17/2020    PROTIME 15.7 (H) 10/17/2020        Physical Examination:    /64   Pulse 89   Temp 97.3 °F (36.3 °C) (Oral)   Resp 18   Ht 5' 6\" (1.676 m)   Wt 160 lb 4.8 oz (72.7 kg)   SpO2 94%   BMI 25.87 kg/m²      Chronically ill appearing  HEENT:  NC/AT  Respiratory:  · Resp Assessment: Normal respiratory effort  · Resp Auscultation: Clear to auscultation bilaterally   Cardiovascular:  · Auscultation: riregularly irregular rate and rhythm, normal S1S2, no murmur, rub or gallop  · Palpation:  Nl PMI  · JVP:  normal  · Extremities: No Edema  Abdomen:  · Soft, non-tender  · Normal bowel sounds  Extremities:  ·  No Cyanosis or Clubbing  Neurological/Psychiatric:  · Oriented to time, place, and person  · Non-anxious  Skin Warm and dry    Lab Results   Component Value Date     10/23/2020     10/22/2020     10/21/2020    K 3.4 10/23/2020    K 4.1 10/22/2020    K 3.8 10/21/2020    K 3.7 10/16/2020    K 3.7 06/09/2019    BUN 15 10/23/2020    BUN 18 10/22/2020    BUN 17 10/21/2020    CREATININE 0.5 10/23/2020    CREATININE <0.5 10/22/2020    CREATININE <0.5 10/21/2020    GLUCOSE 137 10/23/2020    GLUCOSE 76 10/22/2020     Lab Results   Component Value Date    PROBNP 10,500 (H) 10/19/2020     Lab Results   Component Value Date    ALT 41 (H) 10/23/2020    ALT 52 (H) 10/22/2020    AST 30 10/23/2020    AST 49 (H) 10/22/2020     Lab Results   Component Value Date    HGB 12.3 10/23/2020    HGB 13.2 10/22/2020    HCT 36.8 10/23/2020    HCT 39.2 10/22/2020     10/23/2020     10/22/2020     Lab Results   Component Value Date    TRIG 128 03/13/2019    TRIG 115 12/01/2017    HDL 67 03/13/2019    HDL 69 12/01/2017    LDLCALC 89 03/13/2019    LDLCALC 78 12/01/2017     Assessment:    1. Biliary stent obstruction, initial encounter    2. Malignant neoplasm of pancreas, unspecified location of malignancy ([address]    3. Pneumobilia     4. Atrial fibrillation, likely chronic with high Chads score, but with advanced age and diagnosis of cancer, starting anticoagulation is risky  5. Chronic diastolic HF  6. Elevated BNP level     Plan:  1. Continue metoprolol and cardizem to control afib rate  2. Recommend discontinuing IV heparin. The risk of being on anticoagulation outweighs the benefit. She has history of long standing atrial fibrillation and has not been on anticoagulation. 3.  Gentle diuresis and follow BNP level  4. Antibiotics for cholangitis  5. May need drainage of biliary tree if remains jaundiced. 6.  Palliative care consultation in process. Family is considering palliative care vs hospice given her pancreatic cancer diagnosis.     NYHA Class: 4    Ismael Erazo MD, 10/23/2020 8:05 AM

## 2020-10-23 NOTE — PROGRESS NOTES
Physical Therapy  Facility/Department: [address] NURSING  Daily Treatment Note  NAME: Eron Brody  : 1928  MRN: [id number]    Date of Service: 10/23/2020    Discharge Recommendations:  Eron Brody scored a 17/24 on the AM-PAC short mobility form. Current research shows that an AM-PAC score of 17 or less is typically not associated with a discharge to the patient's home setting. Based on the patient's AM-PAC score and their current functional mobility deficits, it is recommended that the patient have 3-5 sessions per week of Physical Therapy at d/c to increase the patient's independence. Please see assessment section for further patient specific details. If patient discharges prior to next session this note will serve as a discharge summary. Please see below for the latest assessment towards goals. 24 hour supervision or assist, Continue to assess pending progress   PT Equipment Recommendations  Equipment Needed: No    Assessment   Body structures, Functions, Activity limitations: Decreased functional mobility ; Decreased ADL status; Decreased strength;Decreased endurance;Decreased balance; Increased pain;Decreased posture  Assessment: Patient able to stand and ambulate this session with much less assistance than initial session 3 days prior. Patient will still need 24 hour assist and continued therapy at d/c. Treatment Diagnosis: weakness. Prognosis: Poor(pancreatic cancer, family looking at hospice vs. palliative)  PT Education: PT Role;Plan of Care;Transfer Training;Functional Mobility Training;Gait Training  Patient Education: Pt verbalized understanding but needing reinforcement. REQUIRES PT FOLLOW UP: Yes  Activity Tolerance  Activity Tolerance: Patient limited by fatigue;Patient limited by endurance     Patient Diagnosis(es): The primary encounter diagnosis was Biliary stent obstruction, initial encounter.  Diagnoses of Malignant neoplasm of pancreas, unspecified location of malignancy ([address] Pneumobilia, and Obstructive jaundice were also pertinent to this visit. has a past medical history of Arthritis, Hypertension, Rectocele, and SVT (supraventricular tachycardia) ([address] has a past surgical history that includes Lumbar disc surgery (2008); Hysterectomy; fracture surgery (4-4-2010); Rectocele repair (4/29/13); Upper gastrointestinal endoscopy (N/A, 6/12/2019); Upper gastrointestinal endoscopy (N/A, 6/12/2019); ERCP (N/A, 6/13/2019); ERCP (N/A, 6/13/2019); ERCP (N/A, 6/13/2019); ERCP (N/A, 10/18/2020); ERCP (N/A, 10/18/2020); ERCP (N/A, 10/21/2020); Upper gastrointestinal endoscopy (10/21/2020); Upper gastrointestinal endoscopy (N/A, 10/21/2020); and Upper gastrointestinal endoscopy (N/A, 10/21/2020). Restrictions  Restrictions/Precautions  Restrictions/Precautions: Fall Risk(high fall risk)  Position Activity Restriction  Other position/activity restrictions: Makenzie Lyman is a 80 y.o. female who presents here to the emergency department, this patient has a history of gallbladder sludge and stage I pancreatic cancer which has resolved. She states that she has had nausea and vomiting for times but nothing since 10. This all started about 6:00 in the morning. She is insistent that this is because of the time-released pill of Prilosec that she took, and if she could have some water she would feel much better. Subjective   General  Chart Reviewed: Yes  Additional Pertinent Hx: pancreatic cancer  Family / Caregiver Present: Yes  Subjective  Subjective: Patient reports he goal is to return to driving again. General Comment  Comments: Patient sitting up in chair. Pain Screening  Patient Currently in Pain: Denies  Vital Signs  Patient Currently in Pain: Denies       Orientation  Orientation  Overall Orientation Status: Within Normal Limits     Objective   Bed mobility  Comment: Not observed. Patient in chair.   Transfers  Sit to Stand: Minimal Assistance  Stand to sit: Contact guard assistance  Bed to Chair: Contact guard assistance  Ambulation  Ambulation?: Yes  Ambulation 1  Surface: level tile  Device: Rolling Walker  Assistance: Minimal assistance  Quality of Gait: forward flexed posture  Gait Deviations: Slow Ximena; Increased REILLY; Decreased step length;Decreased step height;Deviated path  Distance: 15'  Stairs/Curb  Stairs?: No     Balance  Posture: Fair  Sitting - Static: Good  Sitting - Dynamic: Good;-  Standing - Static: Fair  Standing - Dynamic: Fair;-      AROM RLE (degrees)  RLE AROM: WFL  RLE General AROM: c/o pain bilat knees but R>L. Pt reports she is due for bilat injections for OA  AROM LLE (degrees)  LLE AROM : WFL  Strength RLE  Comment: grossly +3/5  Strength LLE  Comment: grossly +3/5     AM-PAC Score  AM-PAC Inpatient Mobility Raw Score : 17 (10/23/20 1814)  AM-PAC Inpatient T-Scale Score : 42.13 (10/23/20 1814)  Mobility Inpatient CMS 0-100% Score: 50.57 (10/23/20 1814)  Mobility Inpatient CMS G-Code Modifier : CK (10/23/20 1814)     Goals  Short term goals  Time Frame for Short term goals: To be met by DC. Short term goal 1: Pt to perform bed mob with supervision. (Not met)  Short term goal 2: Pt to perform transfers with SBA. (Not met)  Short term goal 3: Pt to amb 20 ft with RW and min A.  (Not met)  Long term goals  Time Frame for Long term goals : LTGs=STGs  Patient Goals   Patient goals : To return home indepedently. Plan    Plan  Times per week: 3-5x/week  Times per day: Daily  Current Treatment Recommendations: Strengthening, Balance Training, Functional Mobility Training, Transfer Training, ADL/Self-care Training, Endurance Training, Gait Training, Patient/Caregiver Education & Training, Equipment Evaluation, Education, & procurement, Safety Education & Training  Safety Devices  Type of devices:  All fall risk precautions in place, Call light within reach, Gait belt, Nurse notified, Chair alarm in place, Patient at risk for falls, Left in chair  Restraints  Initially in place: No     Therapy Time   Individual Concurrent Group Co-treatment   Time In 1735         Time Out 1815         Minutes 40         Timed Code Treatment Minutes: Dieter, [address] [facility], DPT, ATC-R [id number]

## 2020-10-23 NOTE — PROGRESS NOTES
[facility] HOSPITALISTS PROGRESS NOTE    10/23/2020 7:42 AM        Name: Umair Nina . Admitted: 10/16/2020  Primary Care Provider: Brody Perez MD (Tel: [phone])      Chief Complaint   Patient presents with    Abdominal Pain     Pt from home, via [facility], states she took prilosec at 6AM and now her abdomen hurts. States had N/V X4 but nothing since 10 AM        Brief History: Patient is a 79 yo female with hx HTN, SVT, pancreatic cancer, hypothyroidism. She presented to hospital with epigastric/RUQ abdominal pain associated with n/v.     Procedure(s) 10/18/2020 by Dr Nicole Camara:  ERCP STENT REMOVAL/EXCHANGE  ERCP BIOPSY    Procedure(s) 10/21/2020 by Dr Nicole Camara:  ERCP ENDOSCOPIC RETROGRADE CHOLANGIOPANCREATOGRAPHY  EGD BILIARY STENT REMOVAL  EGD DUODENAL STENT PLACEMENT 22MMX 90 MM WALLFLEX  EGD DUODENAL DILATION WITH 20 MM  BALLOON    Subjective:  Presently up in bedside chair. Appears to be feeling much better today, patient confirms this. Tolerated full liquid diet last evening, NPO this am for PTC for biliary drainage. Denies abdominal pain or nausea. Cough improved, does note continued orthopnea. Being gently diuresed.      Reviewed interval ancillary notes    Current Medications  furosemide (LASIX) injection 20 mg, Daily  dilTIAZem (CARDIZEM) tablet 60 mg, 3 times per day  morphine (PF) injection 2 mg, Q3H PRN  cefTRIAXone (ROCEPHIN) 2 g IVPB in D5W 50ml minibag, Q24H  metronidazole (FLAGYL) 500 mg in NaCl 100 mL IVPB premix, Q8H  docosanol (ABREVA) 10 % cream, 5x Daily  perflutren lipid microspheres (DEFINITY) injection 1.65 mg, ONCE PRN  sodium chloride flush 0.9 % injection 10 mL, 2 times per day  HYDROmorphone HCl PF (DILAUDID) injection 0.5 mg, Once  metoprolol tartrate (LOPRESSOR) tablet 12.5 mg, BID  acetaminophen (TYLENOL) tablet 650 mg, Q6H PRN    Or  acetaminophen (TYLENOL) suppository 650 mg, Q6H PRN  polyethylene glycol (GLYCOLAX) packet 17 g, Daily PRN  promethazine (PHENERGAN) tablet 12.5 mg, Q6H PRN    Or  ondansetron (ZOFRAN) injection 4 mg, Q6H PRN  potassium chloride (KLOR-CON M) extended release tablet 40 mEq, PRN    Or  potassium bicarb-citric acid (EFFER-K) effervescent tablet 40 mEq, PRN    Or  potassium chloride 10 mEq/100 mL IVPB (Peripheral Line), PRN  magnesium sulfate 2 g in 50 mL IVPB premix, PRN  levothyroxine (SYNTHROID) tablet 100 mcg, Daily  pantoprazole (PROTONIX) injection 40 mg, Daily      Objective:  /64   Pulse 89   Temp 97.3 °F (36.3 °C) (Oral)   Resp 18   Ht 5' 6\" (1.676 m)   Wt 160 lb 4.8 oz (72.7 kg)   SpO2 94%   BMI 25.87 kg/m²     Intake/Output Summary (Last 24 hours) at 10/23/2020 0742  Last data filed at 10/22/2020 1855  Gross per 24 hour   Intake --   Output 1000 ml   Net -1000 ml      Wt Readings from Last 3 Encounters:   10/23/20 160 lb 4.8 oz (72.7 kg)   06/09/19 145 lb (65.8 kg)   12/10/18 145 lb (65.8 kg)       General:  Awake, alert, oriented in NAD  Skin:  Warm and dry. Jaundice improving  Neck:  Supple. No JVD appreciated  Chest:  Normal effort.   Diminished in bases, no wheezes/crackles  Cardiovascular:  Irregular, S1/S2, no murmur/gallop/rub  Abdomen:  Soft, nontender, +bowel sounds  Extremities:  No edema  Neurological: No focal deficits  Psychological: Normal mood and affect      Labs and Tests:  CBC:   Recent Labs     10/21/20  0635 10/22/20  0555 10/23/20  0549   WBC 10.8 9.6 7.9   HGB 13.2 13.2 12.3    182 215     BMP:    Recent Labs     10/21/20  0635 10/22/20  0555 10/23/20  0549    136 137   K 3.8 4.1 3.4*    103 102   CO2 23 23 26   BUN 17 18 15   CREATININE <0.5* <0.5* 0.5*   GLUCOSE 119* 76 137*     Hepatic:   Recent Labs     10/21/20  0635 10/22/20  0555 10/23/20  0549   AST 52* 49* 30   ALT 61* 52* 41*   BILITOT 6.5* 8.5* 4.0*   ALKPHOS 533* 468* 406*       CT Abdomen/Pelvis 10/16/2020: Interval development of moderate intra and extrahepatic biliary dilation,    increased pneumobilia and pancreatic ductal dilation.  This likely represents    malfunction of the distal common bile duct stent.  Recommend GI consultation.         Gastric and hiatal hernia distention with fluid.  There is transition near    the duodenal junction with poor definition of the duodenum.  This could be    reactive with resultant relative gastric outlet obstruction.         Cholelithiasis with mild gallbladder distention with fluid, air and mild    surrounding pericholecystic edema possible cholecystitis.         Gross deformity with severe canal stenosis L2-3 with prior compression    fractures and vertebral augmentation.  This is unchanged.             PCXR 10/19/2020:  Right greater than left bibasilar pulmonary opacities with possible trace    bilateral effusions.  In the setting of cardiomegaly this may be on the basis    of pulmonary edema/positive fluid balance, though multilobar infection with    parapneumonic effusions is a consideration in the appropriate clinical    setting. CXR 10/20/2020:  Small right-sided pleural effusion and right basilar airspace disease. Findings slightly increased compared to prior study.         Mild increased interstitial opacities which may be related bronchovascular    crowding from low lung volumes versus developing edema. CT Abdomen/Pelvis 10/20/2020:    Interval common bile duct stent exchange, with the new stent folded in the    common bile duct and the distal portion terminating within the 2nd portion of    the duodenum.         Extensive pneumobilia with ongoing intra and extrahepatic bile duct    dilatation.         Heterogeneous hepatic parenchymal enhancement may relate to cholangitis, as    described on operative report.  No peter hepatic abscess.         Features of volume overload, with small volume abdominal ascites, bilateral    pleural effusions and mild body wall anasarca. Echo 10/16/2020:  Summary   - Poor quality study due to incomplete visualization   - There is atrial fibrillation. Normal left ventricle size, wall thickness and systolic function with an   estimated ejection fraction of 50%. Grade II diastolic dysfunction. Moderate mitral regurgitation. The aortic valve is thickened/calcified with decreased leaflet mobility   consistent with aortic stenosis. Mild aortic stenosis and Moderate aortic regurgitation. The ascending aorta is dilated measuring 4.6 cm. Right ventricle appears mildly dilated. elevated RA pressure (15 mmHg). Estimated pulmonary artery systolic pressure is elevated at 52 mmHg assuming   a right atrial pressure of 15 mmHg. Problem List  Active Problems:    Pancreatic cancer ([address]    Biliary stent obstruction, initial encounter    Pneumobilia    Bacteremia due to Klebsiella pneumoniae    Streptococcal bacteremia    Essential hypertension    Transaminitis    Jaundice    Ascending aorta dilation (HCC)    Chronic atrial fibrillation (HCC)    Chronic diastolic heart failure ([address]  Resolved Problems:    * No resolved hospital problems. *       Assessment & Plan:   1. Abdominal pain. Hx pancreatic cancer (dx 6/2019). S/p ERCP with biliary stent placement 6/2019, no follow up or treatment since. CT scan this admission concerning for malfunction of stent, concern for gastric outlet obstruction as well. ERCP with stent placement 10/18. CT scan 10/20 consistent with duodenal obstruction and biliary stent obstruction. Repeat ERCP/EGD 10/21 with biliary stent removal and duodenal stent placement. Lipase, bilirubin, liver enzymes trending down. Surgery has signed off. GI on case. Palliative care consult in process to discuss goals of care. Remains full code at present. 2. Bacteremia. Blood cultures + for Klebsiella and Streptococcus.  Believed source is biliary sepsis secondary to obstructed/migrated biliary stent. Initially started on Zosyn, transitioned to meropenem (10/18), switched to IV ceftriaxone and Flagyl (10/22). Remains afebrile, WBC stable. ID on case. 3. Chronic diastolic heart failure. Volume overloaded, proBNP 10,500 (10/19), CT scan (10/20) with evidence of volume overload including bilateral pleural effusions, small volume abdominal ascites, and mild anasarca. Echo 10/21 with EF 50% and grade II diastolic dysfunction, PA systolic 52 mmHg. Fluid overload likely secondary to IV fluids, low albumin. Continue IV Lasix 20 mg daily. Follow renal numbers. Cardio on case. 4. Atrial fibrillation. Developed atrial fibrillation (10/19), in setting of acute illness and fluid overload. Has history of SVT but no history atrial fibrillation per daughter. Remains atrial fibrillation, controlled rate. Heparin discontinued per cardio, risks of anticoagulation outweigh benefits given advanced age and pancreatic cancer. Continue diltiazem 60 mg q 8 hours (was on diltiazem 240 mg daily prior to admit) and metoprolol 12.5 mg BID. Cardiology on case. .   5. Dilated ascending aorta. Measures 4.6 cm on echo. Will need referral to vascular as out-patient once she recovers from acute issues. Daughter aware. 6. Hypotension. Resolved. Stable with resumption of diltiazem. Continue to follow. 7. Hypothyroidism. Continue levothyroxine. TSH 1.55.   8. Hypokalemia. Potassium 3.4 today. On replacement protocol. Add on magnesium level. 9. Deconditioning. PT/OT recommend SNF on DC for rehab. Case management consulted for DC planning. Disposition: PT/OT have evaluated, patient scored 12/24 on short mobility form, 14/24 on ADL form. Recommend SNF for rehab on DC, unsafe for patient to be alone, will need 24/7 assistance. Case management assisting with DC planning. Not ready for DC.         Diet: Diet NPO, After Midnight Exceptions are: Sips with Meds  Code:Full Code  DVT PPX: emelia Krishnan, APRN - CNP 10/23/2020 7:42 AM       Addendum 10/23/2020 11:15 am. Met with son Jac Shultz, wife and palliative care. Son would like to know treatment options and prognosis to help make decision regarding SNF vs home health vs hospice care. Consult placed for oncology. Family contact Dr Freedom Weinstein (son-in-law) cell number [phone].    Zoie Kohli, APRN-CNP
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
## 2020-10-23 NOTE — PLAN OF CARE
Problem: Skin Integrity:  Goal: Will show no infection signs and symptoms  Description: Will show no infection signs and symptoms  Outcome: Ongoing  Note: Pt has redness on buttocks. Barrier cream being applied. Pt repositioned. Problem: Pain:  Goal: Control of acute pain  Description: Control of acute pain  10/23/2020 0152 by Yocasta Bunch RN  Outcome: Ongoing  Note: Pt c/o pain in abdomen. Morphine given x1 along with tylenol. Pt resting now. Pt tolerating full liquid diet. Will monitor.

## 2020-10-23 NOTE — PROGRESS NOTES
[facility]  Gastroenterology Progress Note  Kaitlin Nicole is a 80 y.o. female patient. 1. Biliary stent obstruction, initial encounter    2. Malignant neoplasm of pancreas, unspecified location of malignancy ([address]    3. Pneumobilia        SUBJECTIVE:  Denies ab pain, n/v.      Physical    VITALS:  /73   Pulse 97   Temp 97.8 °F (36.6 °C) (Oral)   Resp 16   Ht 5' 6\" (1.676 m)   Wt 160 lb 4.8 oz (72.7 kg)   SpO2 94%   BMI 25.87 kg/m²   TEMPERATURE:  Current - Temp: 97.8 °F (36.6 °C); Max - Temp  Av.8 °F (36.6 °C)  Min: 97.3 °F (36.3 °C)  Max: 98.8 °F (37.1 °C)    Abdomen soft, ND, NT, no HSM, Bowel sounds normal   + icteric and jaundiced  nad    Data      Recent Labs     10/21/20  0635 10/22/20  0555 10/23/20  0549   WBC 10.8 9.6 7.9   HGB 13.2 13.2 12.3   HCT 39.6 39.2 36.8   MCV 91.6 91.7 90.0    182 215     Recent Labs     10/21/20  0635 10/22/20  0555 10/23/20  0549    136 137   K 3.8 4.1 3.4*    103 102   CO2 23 23 26   BUN 17 18 15   CREATININE <0.5* <0.5* 0.5*     Recent Labs     10/21/20  0635 10/22/20  0555 10/23/20  0549   AST 52* 49* 30   ALT 61* 52* 41*   BILITOT 6.5* 8.5* 4.0*   ALKPHOS 533* 468* 406*     Recent Labs     10/21/20  0635 10/22/20  0555 10/23/20  0549   LIPASE 734.0* 465.0* 268.0*             ASSESSMENT   1. Cholangitis- s/p removal of biliary stents due to need to place duodenal stent for duodenal stenosis. She is afebrile and wbc is normal.  Bili improved from 8 to 4.   3. 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 2. Pancreatic cancer  3. Bacteremia- as noted in ID note        PLAN    1. Since the bilirubin is now improving will hold off on PTC for biliary drainage. Would monitor liver enzymes at least weekly and if they rise again, would then set her up for PTC. 2. abx    3. Resume full liq diet. Would not advance beyond this as vegetable matter will occlude the duodenal stent.       Discussed with  [address]      I have personally performed a face to face diagnostic evaluation on this patient. I have interviewed and examined the patient and I agree with the findings and recommended plan of care. In summary, my findings and plan are the following: Tolerating liquid diet. No ab pain or n/v since duodenal stent. Bili improved today so will hold off on PTC. Ab nt. Will plan to monitor bili and if starts increasing again will plan PTC for biliary drainage. Would give 1 more week of abx for cholangitis.        Rafael Antonio MD  [address]

## 2020-10-23 NOTE — PROGRESS NOTES
Pt assisted to [facility] and bathed. Very weak and unsteady. Devora Nellie in place and pt returned to bed and laying on right side. VSS and pt denies any needs. Call ligtht in reachand bed alarm engaged.

## 2020-10-23 NOTE — PROGRESS NOTES
Comprehensive Nutrition Assessment    Type and Reason for Visit:  Initial(LOS assessment)    Nutrition Recommendations/Plan:   1. Continue NPO  2. Once diet advances, begin Ensure Enlive TID  3. RD available for recc's if nutrition support is determined     Nutrition Assessment:  Pt is at risk for nutrition compromise AEB inadequate PO intake since admission. Pt has been mostly NPO/clear liquids for the past six days. Hx pancreatic cancer. MD states cannot advance diet past full liquids because food matter will clog the duodenal stent. Palliative care following. Family discussing hospice. Will send ONS once diet advances. If POC is not hospice, consider nutrition support. Malnutrition Assessment:  Malnutrition Status:  No malnutrition      Estimated Daily Nutrient Needs:  Energy (kcal):  5135-5131 (20-25kcal/73kg); Weight Used for Energy Requirements:  Current     Protein (g):  71-83 (1.2-1.4g/59kg); Weight Used for Protein Requirements:  Ideal        Fluid (ml/day):  1mL/kcal; Weight Used for Fluid Requirements:  Current      Nutrition Related Findings:  Active BS. Last BM 10/20. +2 pitting BLE edema. -1.8 Liters. Wounds:  Skin Tears      Anthropometric Measures:  · Height: 5' 6\" (167.6 cm)  · Current Body Weight: 160 lb (72.6 kg)   · Admission Body Weight: 154 lb (69.9 kg)    · Ideal Body Weight: 130 lbs; % Ideal Body Weight 123.1 %   · BMI: 25.8  · Adjusted Body Weight:  ; No Adjustment   · BMI Categories: Overweight (BMI 25.0-29. 9)       Nutrition Diagnosis:   · Predicted inadequate energy intake related to catabolic illness as evidenced by NPO or clear liquid status due to medical condition(mostly NPO/CL since admission)      Nutrition Interventions:   Food and/or Nutrient Delivery:  Continue NPO  Nutrition Education/Counseling:  Education not indicated   Coordination of Nutrition Care:  Continued Inpatient Monitoring    Goals:  Meal and supplement intake greater than 50% vs nutrition support Nutrition Monitoring and Evaluation:   Food/Nutrient Intake Outcomes:  Diet Advancement/Tolerance, Food and Nutrient Intake, Supplement Intake, Enteral Nutrition Intake/Tolerance  Physical Signs/Symptoms Outcomes:  Weight     Discharge Planning:     Too soon to determine     Electronically signed by Devon Doty RD, LD on 10/23/20 at 9:57 AM EDT    Contact: [phone]

## 2020-10-23 NOTE — PLAN OF CARE
Nutrition Problem #1: Predicted inadequate energy intake  Intervention: Food and/or Nutrient Delivery: Continue NPO  Nutritional Goals: Meal and supplement intake greater than 50% vs nutrition support

## 2020-10-24 PROBLEM — A41.9 SEPSIS (HCC): Status: ACTIVE | Noted: 2020-10-24

## 2020-10-24 LAB
A/G RATIO: 0.7 (ref 1.1–2.2)
ALBUMIN SERPL-MCNC: 2.3 G/DL (ref 3.4–5)
ALP BLD-CCNC: 362 U/L (ref 40–129)
ALT SERPL-CCNC: 34 U/L (ref 10–40)
ANION GAP SERPL CALCULATED.3IONS-SCNC: 13 MMOL/L (ref 3–16)
AST SERPL-CCNC: 28 U/L (ref 15–37)
BASOPHILS ABSOLUTE: 0 K/UL (ref 0–0.2)
BASOPHILS RELATIVE PERCENT: 0.3 %
BILIRUB SERPL-MCNC: 3.1 MG/DL (ref 0–1)
BLOOD CULTURE, ROUTINE: NORMAL
BUN BLDV-MCNC: 13 MG/DL (ref 7–20)
CALCIUM SERPL-MCNC: 8.1 MG/DL (ref 8.3–10.6)
CHLORIDE BLD-SCNC: 101 MMOL/L (ref 99–110)
CO2: 22 MMOL/L (ref 21–32)
CREAT SERPL-MCNC: 0.6 MG/DL (ref 0.6–1.2)
CULTURE, BLOOD 2: NORMAL
EOSINOPHILS ABSOLUTE: 0.1 K/UL (ref 0–0.6)
EOSINOPHILS RELATIVE PERCENT: 2.2 %
GFR AFRICAN AMERICAN: >60
GFR NON-AFRICAN AMERICAN: >60
GLOBULIN: 3.5 G/DL
GLUCOSE BLD-MCNC: 108 MG/DL (ref 70–99)
HCT VFR BLD CALC: 35.4 % (ref 36–48)
HEMOGLOBIN: 11.9 G/DL (ref 12–16)
LIPASE: 239 U/L (ref 13–60)
LYMPHOCYTES ABSOLUTE: 0.7 K/UL (ref 1–5.1)
LYMPHOCYTES RELATIVE PERCENT: 10.9 %
MCH RBC QN AUTO: 30.6 PG (ref 26–34)
MCHC RBC AUTO-ENTMCNC: 33.6 G/DL (ref 31–36)
MCV RBC AUTO: 91 FL (ref 80–100)
MONOCYTES ABSOLUTE: 0.6 K/UL (ref 0–1.3)
MONOCYTES RELATIVE PERCENT: 9.2 %
NEUTROPHILS ABSOLUTE: 4.8 K/UL (ref 1.7–7.7)
NEUTROPHILS RELATIVE PERCENT: 77.4 %
PDW BLD-RTO: 13.4 % (ref 12.4–15.4)
PLATELET # BLD: 232 K/UL (ref 135–450)
PMV BLD AUTO: 9.8 FL (ref 5–10.5)
POTASSIUM SERPL-SCNC: 3.9 MMOL/L (ref 3.5–5.1)
RBC # BLD: 3.89 M/UL (ref 4–5.2)
SODIUM BLD-SCNC: 136 MMOL/L (ref 136–145)
TOTAL PROTEIN: 5.8 G/DL (ref 6.4–8.2)
WBC # BLD: 6.3 K/UL (ref 4–11)

## 2020-10-24 PROCEDURE — 1200000000 HC SEMI PRIVATE

## 2020-10-24 PROCEDURE — 6360000002 HC RX W HCPCS: Performed by: INTERNAL MEDICINE

## 2020-10-24 PROCEDURE — 6360000002 HC RX W HCPCS: Performed by: NURSE PRACTITIONER

## 2020-10-24 PROCEDURE — 2580000003 HC RX 258: Performed by: INTERNAL MEDICINE

## 2020-10-24 PROCEDURE — 6370000000 HC RX 637 (ALT 250 FOR IP): Performed by: INTERNAL MEDICINE

## 2020-10-24 PROCEDURE — 6370000000 HC RX 637 (ALT 250 FOR IP): Performed by: NURSE PRACTITIONER

## 2020-10-24 PROCEDURE — 36415 COLL VENOUS BLD VENIPUNCTURE: CPT

## 2020-10-24 PROCEDURE — 2500000003 HC RX 250 WO HCPCS: Performed by: INTERNAL MEDICINE

## 2020-10-24 PROCEDURE — 85025 COMPLETE CBC W/AUTO DIFF WBC: CPT

## 2020-10-24 PROCEDURE — C9113 INJ PANTOPRAZOLE SODIUM, VIA: HCPCS | Performed by: INTERNAL MEDICINE

## 2020-10-24 PROCEDURE — 83690 ASSAY OF LIPASE: CPT

## 2020-10-24 PROCEDURE — 80053 COMPREHEN METABOLIC PANEL: CPT

## 2020-10-24 RX ADMIN — FUROSEMIDE 20 MG: 10 INJECTION, SOLUTION INTRAMUSCULAR; INTRAVENOUS at 08:41

## 2020-10-24 RX ADMIN — PANTOPRAZOLE SODIUM 40 MG: 40 INJECTION, POWDER, FOR SOLUTION INTRAVENOUS at 08:42

## 2020-10-24 RX ADMIN — METRONIDAZOLE 500 MG: 500 INJECTION, SOLUTION INTRAVENOUS at 21:07

## 2020-10-24 RX ADMIN — LEVOTHYROXINE SODIUM 100 MCG: 0.1 TABLET ORAL at 05:22

## 2020-10-24 RX ADMIN — METRONIDAZOLE 500 MG: 500 INJECTION, SOLUTION INTRAVENOUS at 05:21

## 2020-10-24 RX ADMIN — DILTIAZEM HYDROCHLORIDE 60 MG: 30 TABLET, FILM COATED ORAL at 15:35

## 2020-10-24 RX ADMIN — Medication 10 ML: at 21:07

## 2020-10-24 RX ADMIN — DOCOSANOL: 100 CREAM TOPICAL at 15:38

## 2020-10-24 RX ADMIN — METRONIDAZOLE 500 MG: 500 INJECTION, SOLUTION INTRAVENOUS at 16:30

## 2020-10-24 RX ADMIN — METOPROLOL TARTRATE 12.5 MG: 25 TABLET, FILM COATED ORAL at 21:06

## 2020-10-24 RX ADMIN — DILTIAZEM HYDROCHLORIDE 60 MG: 30 TABLET, FILM COATED ORAL at 05:21

## 2020-10-24 RX ADMIN — Medication 10 ML: at 08:42

## 2020-10-24 RX ADMIN — METOPROLOL TARTRATE 12.5 MG: 25 TABLET, FILM COATED ORAL at 08:40

## 2020-10-24 RX ADMIN — MORPHINE SULFATE 2 MG: 2 INJECTION, SOLUTION INTRAMUSCULAR; INTRAVENOUS at 16:31

## 2020-10-24 RX ADMIN — DILTIAZEM HYDROCHLORIDE 60 MG: 30 TABLET, FILM COATED ORAL at 21:06

## 2020-10-24 RX ADMIN — DOCOSANOL: 100 CREAM TOPICAL at 11:44

## 2020-10-24 RX ADMIN — MORPHINE SULFATE 2 MG: 2 INJECTION, SOLUTION INTRAMUSCULAR; INTRAVENOUS at 21:12

## 2020-10-24 RX ADMIN — ONDANSETRON 4 MG: 2 INJECTION INTRAMUSCULAR; INTRAVENOUS at 21:05

## 2020-10-24 RX ADMIN — ACETAMINOPHEN 650 MG: 325 TABLET ORAL at 05:21

## 2020-10-24 RX ADMIN — CEFTRIAXONE 2 G: 2 INJECTION, POWDER, FOR SOLUTION INTRAMUSCULAR; INTRAVENOUS at 15:35

## 2020-10-24 ASSESSMENT — PAIN DESCRIPTION - PAIN TYPE
TYPE: ACUTE PAIN
TYPE: ACUTE PAIN

## 2020-10-24 ASSESSMENT — PAIN DESCRIPTION - ORIENTATION: ORIENTATION: MID

## 2020-10-24 ASSESSMENT — PAIN SCALES - GENERAL
PAINLEVEL_OUTOF10: 4
PAINLEVEL_OUTOF10: 8
PAINLEVEL_OUTOF10: 0
PAINLEVEL_OUTOF10: 6
PAINLEVEL_OUTOF10: 0

## 2020-10-24 ASSESSMENT — PAIN DESCRIPTION - DESCRIPTORS: DESCRIPTORS: DISCOMFORT

## 2020-10-24 ASSESSMENT — PAIN DESCRIPTION - LOCATION
LOCATION: BACK
LOCATION: BACK
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
## 2020-10-24 NOTE — PROGRESS NOTES
Hospitalist Progress Note    CC: Pancreatic cancer [facility])    Hospital course:  79 yo female with hx HTN, SVT, pancreatic cancer, hypothyroidism. She presented to hospital with epigastric/RUQ abdominal pain associated with n/v.      Procedure(s) 10/18/2020 by Dr Beto Garner:  ERCP STENT REMOVAL/EXCHANGE  ERCP BIOPSY     Procedure(s) 10/21/2020 by Dr Beto Garner:  ERCP ENDOSCOPIC RETROGRADE CHOLANGIOPANCREATOGRAPHY  EGD BILIARY STENT REMOVAL  EGD DUODENAL STENT PLACEMENT 22MMX 80 MM WALLFLEX  EGD DUODENAL DILATION WITH 20 MM  BALLOON     Biliary stent removed and duodenal stent placement - only full liquid diet so stent does not get obstructed. Bacteremia and sepsis POA based on klebsiella and streptococcus in blood - likely related to biliary stent obstruction and sepsis. On ceftriaxone and flagyl. Has chronic diastolic CHF with volume overload on IV lasix. AFib with RVR likely due ot sepsis, now improved with controlled rate. Has dilated ascending aorta - could consider referral to vascular, however she is a poor surgical candidate due to pancreatic cancer, age and overall poor prognosis. She is an appropriate candidate for hospice. Not anticoagulation candidate due to risk in setting of pancreatic cancer and age. She just wants to go. Sepsis POA. Admit date: 10/16/2020  Days in hospital:  8    24 Hour Events: pt denies pain, wants solid food    Subjective: pt unable to be advanced past full liquid diet due to duodenal stent and risk of obstruction - she has poor understanding of this. ROS:   A comprehensive review of systems was negative except for: has to urinate a lot, but denies pain currently .     Objective:    /66   Pulse 89   Temp 98 °F (36.7 °C) (Oral)   Resp 16   Ht 5' 6\" (1.676 m)   Wt 160 lb 14.4 oz (73 kg)   SpO2 96%   BMI 25.97 kg/m²     Gen: chronically ill appearing  HEENT: NC/AT, moist mucous membranes, no oropharyngeal erythema or exudate  Neck: supple, trachea midline, no anterior cervical or SC LAD  Heart:  Normal s1/s2, IRR, no murmurs, gallops, or rubs. no leg edema  Lungs:  diminished bilaterally, no wheeze, no rales, no rhonchi, no crackles, no use of accessory muscles  Abd: bowel sounds present, soft, nontender, nondistended, no masses  Extrem:  No clubbing, cyanosis,  no edema  Skin: no rashes or lesions  Psych: A & O x3  Neuro: grossly intact, moves all four extremities. Assessment:    Principal Problem:    Pancreatic cancer ([facility] [address]  Active Problems:    Biliary stent obstruction, initial encounter    Pneumobilia    Bacteremia due to Klebsiella pneumoniae    Streptococcal bacteremia    Essential hypertension    Transaminitis    Jaundice    Ascending aorta dilation (HCC)    Chronic atrial fibrillation (HCC)    Chronic diastolic heart failure (HCC)    Sepsis ([facility] [address]  Resolved Problems:    * No resolved hospital problems. *      Plan:  1. Pancreatic cancer with duodenal and biliary obstruction - hospice appopriate - family wants to speak with palliative care  2. Bacteremia with klebsiella and streptococcus, sepsis POA - on ceftriaxone and flagyl - sepsis POA based on leukocytosis, documented bacteremia, transaminitis, hypotension, fever, tachypnea  3. Chronic diastolic CHF - compensated at present - on lasix IV daily  4. Afib - now rate controlled - not anticoagulation candidate  5.   Dilated ascending aorta - would not recommend referral to vascular as outpatient as she is a poor surgical candidate due to age and pancreatic cancer - not much to do unfortunately    Prognosis:  Poor    Code status:  Full code    DVT prophylaxis: SCDs  GI prophylaxis: Proton Pump Inhibitor  Antibiotic prophylaxis indicated:   no  Diet:  DIET FULL LIQUID;  Dietary Nutrition Supplements: Low Calorie High Protein Supplement    Disposition:  Other SNF with hospice would be most appropriate    Medications:  Scheduled Meds:   furosemide  20 mg Intravenous Daily    dilTIAZem  60 mg Oral 3 times per day    cefTRIAXone (ROCEPHIN) IV  2 g Intravenous Q24H    metroNIDAZOLE  500 mg Intravenous Q8H    docosanol   Topical 5x Daily    sodium chloride flush  10 mL Intravenous 2 times per day    HYDROmorphone  0.5 mg Intravenous Once    metoprolol tartrate  12.5 mg Oral BID    levothyroxine  100 mcg Oral Daily    pantoprazole  40 mg Intravenous Daily       PRN Meds:  diphenhydrAMINE, morphine, perflutren lipid microspheres, acetaminophen **OR** acetaminophen, polyethylene glycol, promethazine **OR** ondansetron, potassium chloride **OR** potassium alternative oral replacement **OR** potassium chloride, magnesium sulfate    IV:        Intake/Output Summary (Last 24 hours) at 10/24/2020 1437  Last data filed at 10/24/2020 1406  Gross per 24 hour   Intake 360 ml   Output 1350 ml   Net -990 ml       Results:  CBC:   Recent Labs     10/22/20  0555 10/23/20  0549 10/24/20  0639   WBC 9.6 7.9 6.3   HGB 13.2 12.3 11.9*   HCT 39.2 36.8 35.4*   MCV 91.7 90.0 91.0    215 232     BMP:   Recent Labs     10/22/20  0555 10/23/20  0549 10/24/20  0639    137 136   K 4.1 3.4* 3.9    102 101   CO2 23 26 22   BUN 18 15 13   CREATININE <0.5* 0.5* 0.6     Mag: No results for input(s): MAG in the last 72 hours. Phos:   Lab Results   Component Value Date    PHOS 2.4 (L) 10/17/2020     No components found for: GLU    LIVER PROFILE:   Recent Labs     10/22/20  0555 10/23/20  0549 10/24/20  0639   AST 49* 30 28   ALT 52* 41* 34   LIPASE 465.0* 268.0* 239.0*   BILITOT 8.5* 4.0* 3.1*   ALKPHOS 468* 406* 362*     PT/INR: No results for input(s): PROTIME, INR in the last 72 hours.   APTT:   Recent Labs     10/22/20  0958   APTT 50.6*     UA:No results for input(s): NITRITE, COLORU, PHUR, LABCAST, WBCUA, RBCUA, MUCUS, TRICHOMONAS, YEAST, BACTERIA, CLARITYU, SPECGRAV, LEUKOCYTESUR, UROBILINOGEN, BILIRUBINUR, BLOODU, GLUCOSEU, AMORPHOUS in the last 72 hours.    Invalid input(s): [facility] input(s): ABG  Lab Results   Component Value Date    CALCIUM 8.1 (L) 10/24/2020    PHOS 2.4 (L) 10/17/2020               Electronically signed by Ned Cerna MD on 10/24/2020 at 2:37 PM

## 2020-10-24 NOTE — PLAN OF CARE
Problem: Falls - Risk of:  Goal: Will remain free from falls  Description: Will remain free from falls  Outcome: Ongoing     Problem: Musculor/Skeletal Functional Status  Intervention: Provide standby assistance  Note: Pt. very weak and kyphotic . States she wants to walk around but is unable to make it [facility] without 1 person assistance. Problem: Skin Integrity:  Goal: Absence of new skin breakdown  Description: Absence of new skin breakdown  Outcome: Ongoing     Problem: Skin Integrity:  Intervention: Assist with patient transfer  Note: Pt. Needs assistance with transfer. She has redness on her buttocks dressed with zinc paste, a redness spotted rash on Back.

## 2020-10-24 NOTE — FLOWSHEET NOTE
10/23/20 1200   Encounter Summary   Services provided to: Family   Referral/Consult From: Nurse   Support System Family members   Continue Visiting   (Adv Dir info to Son 10/23 TJR)   Complexity of Encounter Moderate   Length of Encounter 30 minutes   Routine   Type Initial   Assessment Calm; Approachable   Intervention Active listening;Explored feelings, thoughts, concerns; Discussed illness/injury and it's impact; Discussed belief system/Judaism practices/delvin   Outcome Expressed gratitude;Engaged in conversation; Shared life review;Expressed feelings/needs/concerns;Receptive   Met with patients son and dtr in law to discuss patients advance care planning. Son states that patient is oriented and has full capacity. They took documents with intention to have mother complet and sign on Monday. Directed to notify nurse to call for  to help with completion and witness and process.     Electronically signed by Frank Bravo on 10/24/2020 at 7:11 PM
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
## 2020-10-24 NOTE — PROGRESS NOTES
[facility] GI  Gastroenterology Progress Note  Carla Concepcion is a 80 y.o. female patient. 1. Biliary stent obstruction, initial encounter    2. Malignant neoplasm of pancreas, unspecified location of malignancy ([address]    3. Pneumobilia    4. Obstructive jaundice        SUBJECTIVE:  Tolerated oatmeal, cream of wheat, pudding, juice this am with no abd pain and no n/v.     Physical    VITALS:  /66   Pulse 89   Temp 98 °F (36.7 °C) (Oral)   Resp 16   Ht 5' 6\" (1.676 m)   Wt 160 lb 14.4 oz (73 kg)   SpO2 96%   BMI 25.97 kg/m²   TEMPERATURE:  Current - Temp: 98 °F (36.7 °C); Max - Temp  Av.7 °F (36.5 °C)  Min: 97.3 °F (36.3 °C)  Max: 98 °F (36.7 °C)    Abdomen soft, ND, NT,  Bowel sounds normal   L: cta  + icteric and jaundiced  nad    Data      Recent Labs     10/22/20  0555 10/23/20  0549 10/24/20  0639   WBC 9.6 7.9 6.3   HGB 13.2 12.3 11.9*   HCT 39.2 36.8 35.4*   MCV 91.7 90.0 91.0    215 232     Recent Labs     10/22/20  0555 10/23/20  0549 10/24/20  0639    137 136   K 4.1 3.4* 3.9    102 101   CO2 23 26 22   BUN 18 15 13   CREATININE <0.5* 0.5* 0.6     Recent Labs     10/22/20  0555 10/23/20  0549 10/24/20  0639   AST 49* 30 28   ALT 52* 41* 34   BILITOT 8.5* 4.0* 3.1*   ALKPHOS 468* 406* 362*     Recent Labs     10/22/20  0555 10/23/20  0549 10/24/20  0639   LIPASE 465.0* 268.0* 239.0*             ASSESSMENT   1. Cholangitis- now s/p removal of biliary stents due to need to place duodenal stent for duodenal stenosis. She is afebrile and wbc is normal.  Bili improved from 8 to 4 and now 3.1.   3. 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 tolerated full liquids yesterday and today. 2. Pancreatic cancer- oncology note noted. Palliative care consult has been placed. 3. Bacteremia- as noted in ID note        PLAN    1. Since the bilirubin continues improving will hold off on PTC for biliary drainage. Would monitor liver enzymes at least weekly and if they rise again, would then set her up for PTC. 2. abx per id team for cholangitis  3. continue full liq diet. Would not advance beyond this as vegetable matter will occlude the duodenal stent. 4. Per nurse and oncology note palliative care consult has been placed.        Tan Walsh MD  [address] [facility]

## 2020-10-25 LAB
A/G RATIO: 0.6 (ref 1.1–2.2)
ALBUMIN SERPL-MCNC: 2 G/DL (ref 3.4–5)
ALP BLD-CCNC: 398 U/L (ref 40–129)
ALT SERPL-CCNC: 32 U/L (ref 10–40)
ANION GAP SERPL CALCULATED.3IONS-SCNC: 9 MMOL/L (ref 3–16)
AST SERPL-CCNC: 36 U/L (ref 15–37)
BASOPHILS ABSOLUTE: 0 K/UL (ref 0–0.2)
BASOPHILS RELATIVE PERCENT: 0.2 %
BILIRUB SERPL-MCNC: 2.6 MG/DL (ref 0–1)
BUN BLDV-MCNC: 12 MG/DL (ref 7–20)
CALCIUM SERPL-MCNC: 7.9 MG/DL (ref 8.3–10.6)
CHLORIDE BLD-SCNC: 100 MMOL/L (ref 99–110)
CO2: 26 MMOL/L (ref 21–32)
CREAT SERPL-MCNC: <0.5 MG/DL (ref 0.6–1.2)
EOSINOPHILS ABSOLUTE: 0.1 K/UL (ref 0–0.6)
EOSINOPHILS RELATIVE PERCENT: 1.3 %
GFR AFRICAN AMERICAN: >60
GFR NON-AFRICAN AMERICAN: >60
GLOBULIN: 3.5 G/DL
GLUCOSE BLD-MCNC: 108 MG/DL (ref 70–99)
HCT VFR BLD CALC: 35.7 % (ref 36–48)
HEMOGLOBIN: 11.9 G/DL (ref 12–16)
LIPASE: 134 U/L (ref 13–60)
LYMPHOCYTES ABSOLUTE: 1 K/UL (ref 1–5.1)
LYMPHOCYTES RELATIVE PERCENT: 12.8 %
MCH RBC QN AUTO: 30.3 PG (ref 26–34)
MCHC RBC AUTO-ENTMCNC: 33.2 G/DL (ref 31–36)
MCV RBC AUTO: 91.3 FL (ref 80–100)
MONOCYTES ABSOLUTE: 0.6 K/UL (ref 0–1.3)
MONOCYTES RELATIVE PERCENT: 7.6 %
NEUTROPHILS ABSOLUTE: 6 K/UL (ref 1.7–7.7)
NEUTROPHILS RELATIVE PERCENT: 78.1 %
PDW BLD-RTO: 13.3 % (ref 12.4–15.4)
PLATELET # BLD: 267 K/UL (ref 135–450)
PMV BLD AUTO: 9.4 FL (ref 5–10.5)
POTASSIUM SERPL-SCNC: 3.9 MMOL/L (ref 3.5–5.1)
RBC # BLD: 3.91 M/UL (ref 4–5.2)
SODIUM BLD-SCNC: 135 MMOL/L (ref 136–145)
TOTAL PROTEIN: 5.5 G/DL (ref 6.4–8.2)
WBC # BLD: 7.7 K/UL (ref 4–11)

## 2020-10-25 PROCEDURE — 6370000000 HC RX 637 (ALT 250 FOR IP): Performed by: INTERNAL MEDICINE

## 2020-10-25 PROCEDURE — 6360000002 HC RX W HCPCS: Performed by: INTERNAL MEDICINE

## 2020-10-25 PROCEDURE — 6370000000 HC RX 637 (ALT 250 FOR IP): Performed by: NURSE PRACTITIONER

## 2020-10-25 PROCEDURE — 94760 N-INVAS EAR/PLS OXIMETRY 1: CPT

## 2020-10-25 PROCEDURE — 85025 COMPLETE CBC W/AUTO DIFF WBC: CPT

## 2020-10-25 PROCEDURE — 83690 ASSAY OF LIPASE: CPT

## 2020-10-25 PROCEDURE — 36415 COLL VENOUS BLD VENIPUNCTURE: CPT

## 2020-10-25 PROCEDURE — 2580000003 HC RX 258: Performed by: INTERNAL MEDICINE

## 2020-10-25 PROCEDURE — 1200000000 HC SEMI PRIVATE

## 2020-10-25 PROCEDURE — 80053 COMPREHEN METABOLIC PANEL: CPT

## 2020-10-25 PROCEDURE — 6360000002 HC RX W HCPCS: Performed by: NURSE PRACTITIONER

## 2020-10-25 PROCEDURE — 2500000003 HC RX 250 WO HCPCS: Performed by: INTERNAL MEDICINE

## 2020-10-25 PROCEDURE — C9113 INJ PANTOPRAZOLE SODIUM, VIA: HCPCS | Performed by: INTERNAL MEDICINE

## 2020-10-25 RX ADMIN — MORPHINE SULFATE 2 MG: 2 INJECTION, SOLUTION INTRAMUSCULAR; INTRAVENOUS at 12:40

## 2020-10-25 RX ADMIN — ACETAMINOPHEN 650 MG: 325 TABLET ORAL at 22:40

## 2020-10-25 RX ADMIN — METRONIDAZOLE 500 MG: 500 INJECTION, SOLUTION INTRAVENOUS at 21:14

## 2020-10-25 RX ADMIN — METRONIDAZOLE 500 MG: 500 INJECTION, SOLUTION INTRAVENOUS at 13:58

## 2020-10-25 RX ADMIN — DOCOSANOL: 100 CREAM TOPICAL at 16:40

## 2020-10-25 RX ADMIN — DILTIAZEM HYDROCHLORIDE 60 MG: 30 TABLET, FILM COATED ORAL at 21:15

## 2020-10-25 RX ADMIN — MORPHINE SULFATE 2 MG: 2 INJECTION, SOLUTION INTRAMUSCULAR; INTRAVENOUS at 02:18

## 2020-10-25 RX ADMIN — METOPROLOL TARTRATE 12.5 MG: 25 TABLET, FILM COATED ORAL at 09:27

## 2020-10-25 RX ADMIN — DOCOSANOL: 100 CREAM TOPICAL at 09:33

## 2020-10-25 RX ADMIN — DOCOSANOL: 100 CREAM TOPICAL at 12:40

## 2020-10-25 RX ADMIN — CEFTRIAXONE 2 G: 2 INJECTION, POWDER, FOR SOLUTION INTRAMUSCULAR; INTRAVENOUS at 13:58

## 2020-10-25 RX ADMIN — MORPHINE SULFATE 2 MG: 2 INJECTION, SOLUTION INTRAMUSCULAR; INTRAVENOUS at 21:15

## 2020-10-25 RX ADMIN — Medication 10 ML: at 09:30

## 2020-10-25 RX ADMIN — DOCOSANOL: 100 CREAM TOPICAL at 18:52

## 2020-10-25 RX ADMIN — ONDANSETRON 4 MG: 2 INJECTION INTRAMUSCULAR; INTRAVENOUS at 21:15

## 2020-10-25 RX ADMIN — Medication 10 ML: at 21:20

## 2020-10-25 RX ADMIN — LEVOTHYROXINE SODIUM 100 MCG: 0.1 TABLET ORAL at 06:14

## 2020-10-25 RX ADMIN — METOPROLOL TARTRATE 12.5 MG: 25 TABLET, FILM COATED ORAL at 21:15

## 2020-10-25 RX ADMIN — METRONIDAZOLE 500 MG: 500 INJECTION, SOLUTION INTRAVENOUS at 06:13

## 2020-10-25 RX ADMIN — DILTIAZEM HYDROCHLORIDE 60 MG: 30 TABLET, FILM COATED ORAL at 06:13

## 2020-10-25 RX ADMIN — PANTOPRAZOLE SODIUM 40 MG: 40 INJECTION, POWDER, FOR SOLUTION INTRAVENOUS at 09:28

## 2020-10-25 RX ADMIN — DILTIAZEM HYDROCHLORIDE 60 MG: 30 TABLET, FILM COATED ORAL at 13:58

## 2020-10-25 RX ADMIN — FUROSEMIDE 20 MG: 10 INJECTION, SOLUTION INTRAMUSCULAR; INTRAVENOUS at 09:28

## 2020-10-25 RX ADMIN — ONDANSETRON 4 MG: 2 INJECTION INTRAMUSCULAR; INTRAVENOUS at 13:59

## 2020-10-25 ASSESSMENT — PAIN SCALES - GENERAL
PAINLEVEL_OUTOF10: 4
PAINLEVEL_OUTOF10: 0
PAINLEVEL_OUTOF10: 7
PAINLEVEL_OUTOF10: 0
PAINLEVEL_OUTOF10: 7
PAINLEVEL_OUTOF10: 0
PAINLEVEL_OUTOF10: 0
PAINLEVEL_OUTOF10: 7
PAINLEVEL_OUTOF10: 0
PAINLEVEL_OUTOF10: 8
PAINLEVEL_OUTOF10: 0
PAINLEVEL_OUTOF10: 0
PAINLEVEL_OUTOF10: 7

## 2020-10-25 NOTE — PROGRESS NOTES
[facility] GI  Gastroenterology Progress Note  Terra Álvarez is a 80 y.o. female patient. 1. Biliary stent obstruction, initial encounter    2. Malignant neoplasm of pancreas, unspecified location of malignancy ([address]    3. Pneumobilia    4. Obstructive jaundice        SUBJECTIVE:  Continues tolerate full liquids with no n/v, no abd pain. Physical    VITALS:  BP (!) 114/59   Pulse 81   Temp 97.6 °F (36.4 °C) (Oral)   Resp 18   Ht 5' 6\" (1.676 m)   Wt 160 lb 14.4 oz (73 kg)   SpO2 95%   BMI 25.97 kg/m²   TEMPERATURE:  Current - Temp: 97.6 °F (36.4 °C); Max - Temp  Av °F (36.7 °C)  Min: 97.6 °F (36.4 °C)  Max: 98.4 °F (36.9 °C)    Abdomen soft, ND, NT,  Bowel sounds normal   L: cta  + icteric and jaundiced  nad    Data      Recent Labs     10/23/20  0549 10/24/20  0639 10/25/20  0708   WBC 7.9 6.3 7.7   HGB 12.3 11.9* 11.9*   HCT 36.8 35.4* 35.7*   MCV 90.0 91.0 91.3    232 267     Recent Labs     10/23/20  0549 10/24/20  0639 10/25/20  0708    136 135*   K 3.4* 3.9 3.9    101 100   CO2 26 22 26   BUN 15 13 12   CREATININE 0.5* 0.6 <0.5*     Recent Labs     10/23/20  0549 10/24/20  0639 10/25/20  0708   AST 30 28 36   ALT 41* 34 32   BILITOT 4.0* 3.1* 2.6*   ALKPHOS 406* 362* 398*     Recent Labs     10/23/20  0549 10/24/20  0639 10/25/20  0708   LIPASE 268.0* 239.0* 134.0*             ASSESSMENT   1. Cholangitis- now s/p removal of biliary stents due to need to place duodenal stent for duodenal stenosis. She is afebrile and wbc is normal.  Bili improved now to 2.6  3. 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 tolerated full liquids last 3 days. 2. Pancreatic cancer- oncology note noted. Palliative care consult has been placed. 3. Bacteremia- as noted in ID note        PLAN    1. Since the bilirubin continues improving will hold off on PTC for biliary drainage.   Would monitor liver enzymes at least weekly and if they rise again, would then set her up for PTC. 2. abx per id team for cholangitis  3. continue full liq diet. Would not advance beyond this as vegetable matter will occlude the duodenal stent. 4. Per nurse and oncology note palliative care consult has been placed. I called son in law dr Sixto Browning with update today.      Jorge Rowell MD  [address] [facility]

## 2020-10-25 NOTE — PLAN OF CARE
Problem: Safety:  Goal: Free from accidental physical injury  Description: Free from accidental physical injury  10/25/2020 1103 by Pablo Shannon RN  Outcome: Ongoing     Problem: Daily Care:  Intervention: Assist with ability to perform instrumental activities of daily living  Note: Pt. very needy with ADL care calls often on call light for assistance, will continue to monitor and assist as needed. Will continue rounding every 2 hours. Problem: Skin Integrity:  Intervention: Monitor skin integrity, appearance and/or temperature  Note: Pt. with redness on buttocks, purewick and zinc paste used for moisture barrier prevention. Will continue to monitor.

## 2020-10-26 LAB
A/G RATIO: 0.6 (ref 1.1–2.2)
ALBUMIN SERPL-MCNC: 2 G/DL (ref 3.4–5)
ALP BLD-CCNC: 394 U/L (ref 40–129)
ALT SERPL-CCNC: 28 U/L (ref 10–40)
ANION GAP SERPL CALCULATED.3IONS-SCNC: 7 MMOL/L (ref 3–16)
AST SERPL-CCNC: 27 U/L (ref 15–37)
BASOPHILS ABSOLUTE: 0 K/UL (ref 0–0.2)
BASOPHILS RELATIVE PERCENT: 0.3 %
BILIRUB SERPL-MCNC: 2.3 MG/DL (ref 0–1)
BUN BLDV-MCNC: 13 MG/DL (ref 7–20)
CALCIUM SERPL-MCNC: 7.8 MG/DL (ref 8.3–10.6)
CHLORIDE BLD-SCNC: 99 MMOL/L (ref 99–110)
CO2: 30 MMOL/L (ref 21–32)
CREAT SERPL-MCNC: 0.6 MG/DL (ref 0.6–1.2)
EOSINOPHILS ABSOLUTE: 0.1 K/UL (ref 0–0.6)
EOSINOPHILS RELATIVE PERCENT: 1.5 %
GFR AFRICAN AMERICAN: >60
GFR NON-AFRICAN AMERICAN: >60
GLOBULIN: 3.5 G/DL
GLUCOSE BLD-MCNC: 136 MG/DL (ref 70–99)
HCT VFR BLD CALC: 33.8 % (ref 36–48)
HEMOGLOBIN: 11.2 G/DL (ref 12–16)
LIPASE: 145 U/L (ref 13–60)
LYMPHOCYTES ABSOLUTE: 0.8 K/UL (ref 1–5.1)
LYMPHOCYTES RELATIVE PERCENT: 10.9 %
MCH RBC QN AUTO: 30 PG (ref 26–34)
MCHC RBC AUTO-ENTMCNC: 33.1 G/DL (ref 31–36)
MCV RBC AUTO: 90.6 FL (ref 80–100)
MONOCYTES ABSOLUTE: 0.6 K/UL (ref 0–1.3)
MONOCYTES RELATIVE PERCENT: 8 %
NEUTROPHILS ABSOLUTE: 5.7 K/UL (ref 1.7–7.7)
NEUTROPHILS RELATIVE PERCENT: 79.3 %
PDW BLD-RTO: 13.2 % (ref 12.4–15.4)
PLATELET # BLD: 263 K/UL (ref 135–450)
PMV BLD AUTO: 9.6 FL (ref 5–10.5)
POTASSIUM SERPL-SCNC: 3.5 MMOL/L (ref 3.5–5.1)
RBC # BLD: 3.73 M/UL (ref 4–5.2)
SODIUM BLD-SCNC: 136 MMOL/L (ref 136–145)
TOTAL PROTEIN: 5.5 G/DL (ref 6.4–8.2)
WBC # BLD: 7.1 K/UL (ref 4–11)

## 2020-10-26 PROCEDURE — 6360000002 HC RX W HCPCS: Performed by: INTERNAL MEDICINE

## 2020-10-26 PROCEDURE — 80053 COMPREHEN METABOLIC PANEL: CPT

## 2020-10-26 PROCEDURE — 1200000000 HC SEMI PRIVATE

## 2020-10-26 PROCEDURE — 97530 THERAPEUTIC ACTIVITIES: CPT

## 2020-10-26 PROCEDURE — 85025 COMPLETE CBC W/AUTO DIFF WBC: CPT

## 2020-10-26 PROCEDURE — 2500000003 HC RX 250 WO HCPCS: Performed by: INTERNAL MEDICINE

## 2020-10-26 PROCEDURE — 83690 ASSAY OF LIPASE: CPT

## 2020-10-26 PROCEDURE — 2580000003 HC RX 258: Performed by: INTERNAL MEDICINE

## 2020-10-26 PROCEDURE — 6370000000 HC RX 637 (ALT 250 FOR IP): Performed by: NURSE PRACTITIONER

## 2020-10-26 PROCEDURE — C9113 INJ PANTOPRAZOLE SODIUM, VIA: HCPCS | Performed by: INTERNAL MEDICINE

## 2020-10-26 PROCEDURE — 36415 COLL VENOUS BLD VENIPUNCTURE: CPT

## 2020-10-26 PROCEDURE — 97535 SELF CARE MNGMENT TRAINING: CPT

## 2020-10-26 PROCEDURE — 6370000000 HC RX 637 (ALT 250 FOR IP): Performed by: INTERNAL MEDICINE

## 2020-10-26 PROCEDURE — 6360000002 HC RX W HCPCS: Performed by: NURSE PRACTITIONER

## 2020-10-26 PROCEDURE — 97116 GAIT TRAINING THERAPY: CPT

## 2020-10-26 PROCEDURE — 99232 SBSQ HOSP IP/OBS MODERATE 35: CPT | Performed by: INTERNAL MEDICINE

## 2020-10-26 RX ADMIN — MORPHINE SULFATE 2 MG: 2 INJECTION, SOLUTION INTRAMUSCULAR; INTRAVENOUS at 21:56

## 2020-10-26 RX ADMIN — METOPROLOL TARTRATE 12.5 MG: 25 TABLET, FILM COATED ORAL at 20:19

## 2020-10-26 RX ADMIN — METRONIDAZOLE 500 MG: 500 INJECTION, SOLUTION INTRAVENOUS at 05:41

## 2020-10-26 RX ADMIN — METOPROLOL TARTRATE 12.5 MG: 25 TABLET, FILM COATED ORAL at 08:19

## 2020-10-26 RX ADMIN — DOCOSANOL: 100 CREAM TOPICAL at 07:20

## 2020-10-26 RX ADMIN — Medication 10 ML: at 20:19

## 2020-10-26 RX ADMIN — DILTIAZEM HYDROCHLORIDE 60 MG: 30 TABLET, FILM COATED ORAL at 20:18

## 2020-10-26 RX ADMIN — FUROSEMIDE 20 MG: 10 INJECTION, SOLUTION INTRAMUSCULAR; INTRAVENOUS at 08:19

## 2020-10-26 RX ADMIN — DOCOSANOL: 100 CREAM TOPICAL at 15:19

## 2020-10-26 RX ADMIN — ACETAMINOPHEN 650 MG: 325 TABLET ORAL at 05:44

## 2020-10-26 RX ADMIN — MORPHINE SULFATE 2 MG: 2 INJECTION, SOLUTION INTRAMUSCULAR; INTRAVENOUS at 18:53

## 2020-10-26 RX ADMIN — LEVOTHYROXINE SODIUM 100 MCG: 0.1 TABLET ORAL at 05:44

## 2020-10-26 RX ADMIN — DILTIAZEM HYDROCHLORIDE 60 MG: 30 TABLET, FILM COATED ORAL at 13:10

## 2020-10-26 RX ADMIN — MORPHINE SULFATE 2 MG: 2 INJECTION, SOLUTION INTRAMUSCULAR; INTRAVENOUS at 01:46

## 2020-10-26 RX ADMIN — Medication 10 ML: at 10:04

## 2020-10-26 RX ADMIN — PANTOPRAZOLE SODIUM 40 MG: 40 INJECTION, POWDER, FOR SOLUTION INTRAVENOUS at 08:20

## 2020-10-26 RX ADMIN — METRONIDAZOLE 500 MG: 500 INJECTION, SOLUTION INTRAVENOUS at 20:19

## 2020-10-26 RX ADMIN — METRONIDAZOLE 500 MG: 500 INJECTION, SOLUTION INTRAVENOUS at 13:10

## 2020-10-26 RX ADMIN — DOCOSANOL: 100 CREAM TOPICAL at 12:06

## 2020-10-26 RX ADMIN — CEFTRIAXONE 2 G: 2 INJECTION, POWDER, FOR SOLUTION INTRAMUSCULAR; INTRAVENOUS at 14:22

## 2020-10-26 ASSESSMENT — ENCOUNTER SYMPTOMS
SHORTNESS OF BREATH: 0
CHOKING: 0
CHEST TIGHTNESS: 0
RHINORRHEA: 0
EYE DISCHARGE: 0
STRIDOR: 0
DIARRHEA: 0
COUGH: 0
EYE REDNESS: 0
PHOTOPHOBIA: 0
ABDOMINAL PAIN: 0
FACIAL SWELLING: 0
NAUSEA: 0
APNEA: 0
COLOR CHANGE: 0
BLOOD IN STOOL: 0
TROUBLE SWALLOWING: 0

## 2020-10-26 ASSESSMENT — PAIN SCALES - GENERAL
PAINLEVEL_OUTOF10: 5
PAINLEVEL_OUTOF10: 0
PAINLEVEL_OUTOF10: 0
PAINLEVEL_OUTOF10: 5
PAINLEVEL_OUTOF10: 0
PAINLEVEL_OUTOF10: 10
PAINLEVEL_OUTOF10: 6

## 2020-10-26 NOTE — PROGRESS NOTES
Called patient and daughter and left messages requesting that patient contact the office to discuss scheduling a biopsy.   Meeting with son and his wife. Discussed overview of patients current status. Son requesting follow up consult with Oncology to determine severity of disease. Son wants to meet with  to discuss the Advance Directive form he has in place with his mother. May want to update form.  notified. Emotional support provided with our discussions. Family not ready to seek hospice support at this time. Agree to follow up next week. Will continue to support patient and family as needed.

## 2020-10-26 NOTE — PROGRESS NOTES
Infectious Diseases   Progress Note      Admission Date: 10/16/2020  Hospital Day: Hospital Day: 11   Attending: Nikki Tobias MD  Date of service: 10/26/2020     Chief complaint/ Reason for consult:     · Polymicrobial bacteremia with Streptococcus anginosus, Klebsiella pneumoniae  · Pancreatic cancer and obstructive jaundice s/p ERCP with stent placement in June 2019  · Transaminitis    Microbiology:        I have reviewed allavailable micro lab data and cultures    · Blood culture (2/2) - collected on 10/16/2020: Streptococcus anginosus, Klebsiella pneumoniae    Klebsiella pneumoniae (4)     Antibiotic  Interpretation  SEGUN  Status     ampicillin  Resistant        ceFAZolin  Sensitive  <=4  mcg/mL      cefepime  Sensitive  <=0.12  mcg/mL      cefTRIAXone  Sensitive  <=0.25  mcg/mL      ciprofloxacin  Sensitive  <=0.25  mcg/mL      ertapenem  Sensitive  <=0.12  mcg/mL      gentamicin  Sensitive  <=1  mcg/mL      levofloxacin  Sensitive  <=0.12  mcg/mL      piperacillin-tazobactam  Sensitive  <=4  mcg/mL      trimethoprim-sulfamethoxazole  Sensitive  <=20  mcg/mL          Antibiotics and immunizations:       Current antibiotics: All antibiotics and their doses were reviewed by me    Recent Abx Admin                   metronidazole (FLAGYL) 500 mg in NaCl 100 mL IVPB premix (mg) 500 mg New Bag 10/26/20 0541     500 mg New Bag 10/25/20 2114     500 mg New Bag  1358    cefTRIAXone (ROCEPHIN) 2 g IVPB in D5W 50ml minibag (g) 2 g New Bag 10/25/20 1358                  Immunization History: All immunization history was reviewed by me today. Immunization History   Administered Date(s) Administered    Tdap (Boostrix, Adacel) 12/10/2018       Known drug allergies: All allergies were reviewed and updated    Allergies   Allergen Reactions    Sulfa Antibiotics Nausea And Vomiting       Social history:     Social History:  All social andepidemiologic history was reviewed and updated by me today as needed. · Tobacco use:   reports that she has never smoked. She has never used smokeless tobacco.  · Alcohol use:   reports no history of alcohol use. · Currently lives in: [address] [place] [address]  ·  reports no history of drug use. Assessment:     The patient is a 80 y.o. old female who  has a past medical history of Arthritis, Hypertension, Rectocele, and SVT (supraventricular tachycardia) ([address] with following problems:    · Polymicrobial bacteremia with Streptococcus anginosus, Klebsiella pneumoniae-improving  · Pancreatic cancer and obstructive jaundice s/p ERCP with stent placement in June 2019-palliative care discussions in progress  · Transaminitis- -improving  · Worsening jaundice-improving  · Biliary ductal dilatation status post ERCP on 10/18/2020 removal of old stent that had migrated and placement of new stent-s/p repeat ERCP on 10/21/20  · Essential hypertension  · Overweight due to excess calorie intake : Body mass index is 25.97 kg/m². Discussion:        The patient is on IV ceftriaxone and IV Flagyl. She is tolerating the antibiotics okay. She is afebrile. White cell count is 7100 today. Blood cultures from 10/20/2020 remain negative. 2D echo done on 10/21/2020 had not shown any vegetations. Serum creatinine 0.6. Liver functions are okay. Serum bilirubin is slowly improving and is 2.3 today. Lipase is 145. AST is 27 and ALT is 28 today. Plan:     Diagnostic Workup:      · Continue to follow  fever curve, WBC count and blood cultures  · Follow up on liver and renal function    Antimicrobials:    · The patient has been on appropriate antibiotic coverage since 10/16/2020. Today is day 10 of antibiotics  · The patient has pancreatic cancer with duodenal and biliary obstruction and is being considered hospice appropriate.   GI and oncology notes reviewed  · Palliative care discussions are in progress  · Will recommend switching her to oral Augmentin 500 mg every 12 hour for 1 week at discharge to complete antibiotic course  · Recommend taking oral probiotic twice daily  · Discussed all above with patient and RN. Little further to add from ID standpoint      Drug Monitoring:    · Continue monitoring for antibiotic toxicity as follows: CBC, CMP  · Continue to watch for following: new or worsening fever, new hypotension, hives, lip swelling and redness or purulence at vascular access sites. I/v access Management:    · Continue to monitor i.v access sites for erythema, induration, discharge or tenderness. · As always, continue efforts to minimize tubes/lines/drains as clinically appropriate to reduce chances of line associated infections. Patient education and counseling:       · The patient was educated in detail about the side-effects of various antibiotics and things to watch for like new rashes, lip swelling, severe reaction, worsening diarrhea, break through fever etc.  · Discussed patient's condition and what to expect. All of the patient's questions were addressed in a satisfactory manner and patient verbalized understanding all instructions. Thanks for allowing me to participate in your patient's care. I will sign off today, but will be available to answer any further questions or concerns that may arise during patient's stay in the hospital.          Subjective: Interval history: Interval history was obtained from chart review and RN. She is afebrile. Bilirubin is slowly improving. Tolerating antibiotics okay     REVIEW OF SYSTEMS:      Review of Systems   Constitutional: Negative for chills, diaphoresis and unexpected weight change. HENT: Negative for congestion, ear discharge, ear pain, facial swelling, hearing loss, rhinorrhea and trouble swallowing. Eyes: Negative for photophobia, discharge, redness and visual disturbance. Respiratory: Negative for apnea, cough, choking, chest tightness, shortness of breath and stridor.     Cardiovascular: Negative by Caren Neumann MD at [address] N/A 6/12/2019    EGD BIOPSY performed by Caren Neumann MD at [address]  10/21/2020    EGD BILIARY STENT REMOVAL performed by Tre Moreno MD at [address] N/A 10/21/2020    EGD DUODENAL STENT PLACEMENT 22MMX 80  Brigid Michael performed by Tre Moreno MD at [address] N/A 10/21/2020    EGD DUODENAL DILATION WITH 20 MM  BALLOON [facility]       Family History: All family history was reviewed today. History reviewed. No pertinent family history. Objective:       PHYSICAL EXAM:      Vitals:   Vitals:    10/26/20 0127 10/26/20 0540 10/26/20 0730 10/26/20 1215   BP: 100/80 115/69 130/76 108/62   Pulse: 73  78 62   Resp: 18  17 18   Temp: 98.6 °F (37 °C)  97.8 °F (36.6 °C) 97.9 °F (36.6 °C)   TempSrc: Oral  Oral Oral   SpO2: 95%  94% 97%   Weight:       Height:           Physical Exam  Vitals signs and nursing note reviewed. Constitutional:       General: She is not in acute distress. Appearance: She is well-developed. She is not diaphoretic. HENT:      Head: Normocephalic. Right Ear: External ear normal.      Left Ear: External ear normal.      Nose: Nose normal.   Eyes:      General: No scleral icterus. Right eye: No discharge. Left eye: No discharge. Conjunctiva/sclera: Conjunctivae normal.      Pupils: Pupils are equal, round, and reactive to light. Neck:      Musculoskeletal: Normal range of motion and neck supple. Cardiovascular:      Rate and Rhythm: Normal rate and regular rhythm. Heart sounds: No murmur. No friction rub. Pulmonary:      Effort: Pulmonary effort is normal.      Breath sounds: No stridor. No wheezing or rales. Chest:      Chest wall: No tenderness.    Abdominal: Palpations: Abdomen is soft. There is no mass. Tenderness: There is no abdominal tenderness. There is no guarding or rebound. Musculoskeletal:         General: No tenderness. Lymphadenopathy:      Cervical: No cervical adenopathy. Skin:     General: Skin is warm and dry. Findings: No erythema or rash. Neurological:      Mental Status: She is alert and oriented to person, place, and time. Motor: No abnormal muscle tone. Psychiatric:         Judgment: Judgment normal.            Lines: All vascular access sites are healthy with no local erythema, discharge or tenderness. Intake and output:    I/O last 3 completed shifts: In: 740 [P.O.:720; I.V.:20]  Out: 1100 [Urine:1100]    Lab Data:   All available labs and old records have been reviewed by me. CBC:  Recent Labs     10/24/20  0639 10/25/20  0708 10/26/20  0528   WBC 6.3 7.7 7.1   RBC 3.89* 3.91* 3.73*   HGB 11.9* 11.9* 11.2*   HCT 35.4* 35.7* 33.8*    267 263   MCV 91.0 91.3 90.6   MCH 30.6 30.3 30.0   MCHC 33.6 33.2 33.1   RDW 13.4 13.3 13.2        BMP:  Recent Labs     10/24/20  0639 10/25/20  0708 10/26/20  0528    135* 136   K 3.9 3.9 3.5    100 99   CO2 22 26 30   BUN 13 12 13   CREATININE 0.6 <0.5* 0.6   CALCIUM 8.1* 7.9* 7.8*   GLUCOSE 108* 108* 136*        Hepatic Function Panel:   Lab Results   Component Value Date    ALKPHOS 394 10/26/2020    ALT 28 10/26/2020    AST 27 10/26/2020    PROT 5.5 10/26/2020    PROT 7.4 11/07/2012    BILITOT 2.3 10/26/2020    BILIDIR 3.6 10/17/2020    IBILI 0.4 10/17/2020    LABALBU 2.0 10/26/2020       CPK: No results found for: CKTOTAL  ESR:   Lab Results   Component Value Date    SEDRATE 35 (H) 06/08/2019     CRP: No results found for: CRP        Imaging: All pertinent images and reports for the current visit were reviewed by me during this visit. FL ERCP BILIARY AND PANCREATIC S&I   Final Result   Placement of expandable wire stent in the common duct.   Please refer to the   procedure report for further details. XR CHEST (2 VW)   Final Result   Small right-sided pleural effusion and right basilar airspace disease. Findings slightly increased compared to prior study. Mild increased interstitial opacities which may be related bronchovascular   crowding from low lung volumes versus developing edema. CT ABDOMEN PELVIS W IV CONTRAST Additional Contrast? Oral   Final Result   Interval common bile duct stent exchange, with the new stent folded in the   common bile duct and the distal portion terminating within the 2nd portion of   the duodenum. Extensive pneumobilia with ongoing intra and extrahepatic bile duct   dilatation. Heterogeneous hepatic parenchymal enhancement may relate to cholangitis, as   described on operative report. No peter hepatic abscess. Features of volume overload, with small volume abdominal ascites, bilateral   pleural effusions and mild body wall anasarca. XR CHEST PORTABLE   Final Result   Right greater than left bibasilar pulmonary opacities with possible trace   bilateral effusions. In the setting of cardiomegaly this may be on the basis   of pulmonary edema/positive fluid balance, though multilobar infection with   parapneumonic effusions is a consideration in the appropriate clinical   setting. FL ERCP BILIARY AND PANCREATIC S&I   Final Result   Unremarkable ERCP images. Please refer to the procedure report for further   details. CT ABDOMEN PELVIS W IV CONTRAST Additional Contrast? None   Final Result   Interval development of moderate intra and extrahepatic biliary dilation,   increased pneumobilia and pancreatic ductal dilation. This likely represents   malfunction of the distal common bile duct stent. Recommend GI consultation. Gastric and hiatal hernia distention with fluid. There is transition near   the duodenal junction with poor definition of the duodenum.   This could be reactive with resultant relative gastric outlet obstruction. Cholelithiasis with mild gallbladder distention with fluid, air and mild   surrounding pericholecystic edema possible cholecystitis. Gross deformity with severe canal stenosis L2-3 with prior compression   fractures and vertebral augmentation. This is unchanged. Medications: All current and past medications were reviewed.      furosemide  20 mg Intravenous Daily    dilTIAZem  60 mg Oral 3 times per day    cefTRIAXone (ROCEPHIN) IV  2 g Intravenous Q24H    metroNIDAZOLE  500 mg Intravenous Q8H    docosanol   Topical 5x Daily    sodium chloride flush  10 mL Intravenous 2 times per day    HYDROmorphone  0.5 mg Intravenous Once    metoprolol tartrate  12.5 mg Oral BID    levothyroxine  100 mcg Oral Daily    pantoprazole  40 mg Intravenous Daily           diphenhydrAMINE, morphine, perflutren lipid microspheres, acetaminophen **OR** acetaminophen, polyethylene glycol, promethazine **OR** ondansetron, potassium chloride **OR** potassium alternative oral replacement **OR** potassium chloride, magnesium sulfate      Problem list:       Patient Active Problem List   Diagnosis Code    Impacted cerumen of right ear H61.21    Sensorineural hearing loss (SNHL) of both ears H90.3    Chronic eczematous otitis externa of both ears H60.8X3    Pain of left tibia M89.8X6    Hepatitis K75.9    Obstructive jaundice K83.1    Pancreatic cancer (HCC) C25.9    Biliary stent obstruction, initial encounter T85.590A    Pneumobilia K83.8    Bacteremia due to Klebsiella pneumoniae R78.81, B96.1    Streptococcal bacteremia R78.81, B95.5    Essential hypertension I10    Transaminitis R74.01    Jaundice R17    Ascending aorta dilation (HCC) I77.810    Chronic atrial fibrillation (HCC) I48.20    Chronic diastolic heart failure (HCC) I50.32    Sepsis ([address] A41.9       Please note that this chart was generated using Dragon dictation software. Although every effort was made to ensure the accuracy of this automated transcription, some errors in transcription may have occurred inadvertently. If you may need any clarification, please do not hesitate to contact me through EPIC or at the phone number provided below with my electronic signature. Any pictures or media included in this note were obtained after taking informed verbal consent from the patient and with their approval to include those in the patient's medical record.     Chato Ann MD, MPH  10/26/2020 , 12:34 PM   [facility]   [address] [place] [address]  Office: [phone]  Fax: [phone]  Clinic days:  Tuesday & Thursday

## 2020-10-26 NOTE — PROGRESS NOTES
Occupational Therapy  Facility/Department: [facility] 3A NURSING  Daily Treatment Note  NAME: Javier Barboza  : 1928  MRN: [id number]    Date of Service: 10/26/2020    Discharge Recommendations:      Javier Barboza scored a 14/24 on the AM-PAC ADL Inpatient form. Current research shows that an AM-PAC score of 17 or less is typically not associated with a discharge to the patient's home setting. Based on the patient's AM-PAC score and their current ADL deficits, it is recommended that the patient have 3-5 sessions per week of Occupational Therapy at d/c to increase the patient's independence. Please see assessment section for further patient specific details. If patient discharges prior to next session this note will serve as a discharge summary. Please see below for the latest assessment towards goals. OT Equipment Recommendations  Equipment Needed: No  Other: defer to next level of care    Assessment   Performance deficits / Impairments: Decreased functional mobility ; Decreased ADL status; Decreased endurance  Assessment: Pt below baseline level of occupational function. Pt would benefit from acute OT services to address above deficits  Treatment Diagnosis: Decreased functional mobility, ADL status, and endurance associated w/pancreatic cancer  Prognosis: Fair  Decision Making: Low Complexity  OT Education: OT Role;Plan of Care;Energy Conservation;Orientation;Precautions; Family Education; ADL Adaptive Strategies;Transfer Training  Patient Education: Pt verbalized understanding however would benefit from continued reinforcement for carryover  REQUIRES OT FOLLOW UP: Yes  Activity Tolerance  Activity Tolerance: Patient limited by fatigue;Patient Tolerated treatment well  Activity Tolerance: increased SOB with exertion, pt able to recover performing pursed lip breathing, increased time to complete all tasks  Safety Devices  Safety Devices in place: Yes  Type of devices:  All fall risk precautions in place;Call light within reach; Chair alarm in place;Gait belt;Patient at risk for falls; Left in chair;Nurse notified         Patient Diagnosis(es): The primary encounter diagnosis was Biliary stent obstruction, initial encounter. Diagnoses of Malignant neoplasm of pancreas, unspecified location of malignancy ([facility] [address] Pneumobilia, and Obstructive jaundice were also pertinent to this visit. has a past medical history of Arthritis, Hypertension, Rectocele, and SVT (supraventricular tachycardia) ([address] has a past surgical history that includes Lumbar disc surgery (2008); Hysterectomy; fracture surgery (4-4-2010); Rectocele repair (4/29/13); Upper gastrointestinal endoscopy (N/A, 6/12/2019); Upper gastrointestinal endoscopy (N/A, 6/12/2019); ERCP (N/A, 6/13/2019); ERCP (N/A, 6/13/2019); ERCP (N/A, 6/13/2019); ERCP (N/A, 10/18/2020); ERCP (N/A, 10/18/2020); ERCP (N/A, 10/21/2020); Upper gastrointestinal endoscopy (10/21/2020); Upper gastrointestinal endoscopy (N/A, 10/21/2020); and Upper gastrointestinal endoscopy (N/A, 10/21/2020). Restrictions  Restrictions/Precautions  Restrictions/Precautions: Fall Risk(high fall risk, up with assistance, strict I&O, diet full liquid)  Position Activity Restriction  Other position/activity restrictions: 81 yo female with hx HTN, SVT, pancreatic cancer, hypothyroidism. She presented to hospital with epigastric/RUQ abdominal pain associated with n/v.   Biliary stent obstruction s/p removal of biliary stents, s/p duodenal stent placement  Subjective   General  Chart Reviewed: Yes  Response to previous treatment: Patient with no complaints from previous session  Family / Caregiver Present: No  Diagnosis: pancreatic cancer  Subjective  Subjective: Pt seated in chair upon arrival. Pleasant and agreeable to therapy session.  Motivated to participate  Vital Signs  Patient Currently in Pain: Denies   Orientation  Orientation  Overall Orientation Status: Impaired  Orientation Level: Oriented to time;Oriented to place;Oriented to person;Disoriented to situation  Objective    ADL  Grooming: Setup;Contact guard assistance(in stance at sink to perform oral care, pt combed hair seated in chair)  UE Bathing: Setup;Stand by assistance  LE Bathing: Setup; Moderate assistance(A for thoroughness)  UE Dressing: Moderate assistance(A d/t line management with gown change)  LE Dressing: Moderate assistance(A to doff/don R sock d/t fatigue)  Toileting: None  Additional Comments: Pt completed UB/LB bathing/dressing seated in chair. Intermittently in stance to complete grooming tasks        Balance  Sitting Balance: Stand by assistance  Standing Balance: Minimal assistance  Standing Balance  Time: ~5 minutes  Activity: ambulation to sink, stance for ADL completion  Comment: with use of rw, forward flexed posture  Functional Mobility  Functional - Mobility Device: Rolling Walker  Activity: Other  Assist Level: Minimal assistance  Functional Mobility Comments: ~8' to sink with use of rw, verbal and tactile cuing for safe rw proximity     Transfers  Sit to stand: Minimal assistance  Stand to sit: Moderate assistance(d/t decreased eccentric control)  Transfer Comments: cuing for hand placement     Cognition  Overall Cognitive Status: Exceptions  Arousal/Alertness: Appropriate responses to stimuli  Following Commands: Follows one step commands with increased time; Follows one step commands with repetition  Attention Span: Attends with cues to redirect; Difficulty dividing attention  Memory: Decreased short term memory  Safety Judgement: Decreased awareness of need for safety  Problem Solving: Assistance required to generate solutions  Insights: Decreased awareness of deficits  Initiation: Requires cues for some  Sequencing: Requires cues for some     Perception  Overall Perceptual Status: [facility]       Plan   Plan  Times per week: 3-5  Times per day: Daily  Current Treatment Recommendations: Balance Training, Functional Mobility Training, Endurance Training, Safety Education & Training, Self-Care / ADL, Patient/Caregiver Education & Training    AM-PAC Score        AM-PAC Inpatient Daily Activity Raw Score: 14 (10/26/20 1539)  AM-PAC Inpatient ADL T-Scale Score : 33.39 (10/26/20 1539)  ADL Inpatient CMS 0-100% Score: 59.67 (10/26/20 1539)  ADL Inpatient CMS G-Code Modifier : CK (10/26/20 1539)    Goals  Short term goals  Time Frame for Short term goals: discharge  Short term goal 1: Mod A for functional transfers to ADL surfaces w/RW  Short term goal 2: Mod A for functional mobility for ADL tasks w/RW-min a 10/26  Short term goal 3: Min A LB dressing/bathing-mod A 10/26  Short term goal 4: SBA UB dressing/bathing-mod A 10/26  Short term goal 5: S/U for grooming tasks while seated-ongoing 10/26       Therapy Time   Individual Concurrent Group Co-treatment   Time In 1440         Time Out 1533         Minutes 53            Timed Code Treatment Minutes:  53 Minutes  Total Treatment Minutes:  [address], [address] [place] [address] Drive

## 2020-10-26 NOTE — PLAN OF CARE
Problem: Safety:  Goal: Free from accidental physical injury  Description: Free from accidental physical injury  10/25/2020 2143 by Javid Franco RN  Outcome: Ongoing     Problem: Daily Care:  Goal: Daily care needs are met  Description: Daily care needs are met  Outcome: Ongoing     Problem: Pain:  Goal: Patient's pain/discomfort is manageable  Description: Patient's pain/discomfort is manageable  Outcome: Ongoing     Problem: Skin Integrity:  Intervention: Monitor skin integrity, appearance and/or temperature  10/25/2020 2143 by Javid Franco RN  Note: Will continue to monitor

## 2020-10-26 NOTE — PROGRESS NOTES
Physical Therapy  Facility/Department: [facility] 3A NURSING  Daily Treatment Note  NAME: Jose Roberto Hernandez  : 1928  MRN: [id number]    Date of Service: 10/26/2020    Discharge Recommendations:  Jose Roberto Hernandez scored a 17/24 on the AM-PAC short mobility form. Current research shows that an AM-PAC score of 17 or less is typically not associated with a discharge to the patient's home setting. Based on the patient's AM-PAC score and their current functional mobility deficits, it is recommended that the patient have 3-5 sessions per week of Physical Therapy at d/c to increase the patient's independence. Please see assessment section for further patient specific details. If patient discharges prior to next session this note will serve as a discharge summary. Please see below for the latest assessment towards goals. 24 hour supervision or assist, Continue to assess pending progress   PT Equipment Recommendations  Equipment Needed: No  Other: owns rollator (2 - one on each floor of home); plan to continue to assess and likely defer recommendation to next level of care    Assessment   Body structures, Functions, Activity limitations: Decreased functional mobility ; Decreased ADL status; Decreased strength;Decreased endurance;Decreased balance;Decreased posture  Assessment: Patient demonstrates ongoing progress this date, improving gait and tolerance for activity with use of RW. O2 saturation decreased to 87% with gait on room air - RN notified. Patient eager to participate and to return to her prior level of independence, including to drive her car. Per son, working towards installing stair lift to increase accessibility of home in the future. Patient and son with several questions related to discharge disposition and patient's goals of independence - PT recommends ongoing PT services upon d/c and 24 hour (A).   Treatment Diagnosis: impaired functional mobility  Prognosis: Good;Guarded  Patient Education: Comment  Comments: Patient sitting up in chair upon arrival - agreeable to PT. Orientation  Orientation  Overall Orientation Status: Within Normal Limits  Objective      Transfers  Sit to Stand: Contact guard assistance(to RW - intermittent cues for hand placement and sequencing)  Stand to sit: Contact guard assistance(from RW - minimal cues for hand placement and sequencing)  Stand Pivot Transfers: Contact guard assistance(with RW - minimal cues for RW management)  Ambulation  Ambulation?: Yes  Ambulation 1  Surface: level tile  Device: Rolling Walker  Assistance: Contact guard assistance  Quality of Gait: forward flexed posture, (B) knee flexion throughout, trendelenburg gait, appears effortful, slow javan, decreased (B) step length and foot clearance  Distance: 30ft x 2  Comments: seated rest in between trials - O2 saturation decreased to 87% after first gait trial - resolved to > 92% with seated rest and cues for pursed lip breathing     Balance  Posture: Fair(forward flexed, rounded shoulders)  Sitting - Static: Good  Sitting - Dynamic: Good  Standing - Static: Fair;+  Standing - Dynamic: Fair        AM-PAC Score  AM-PAC Inpatient Mobility Raw Score : 17 (10/26/20 1420)  AM-PAC Inpatient T-Scale Score : 42.13 (10/26/20 1420)  Mobility Inpatient CMS 0-100% Score: 50.57 (10/26/20 1420)  Mobility Inpatient CMS G-Code Modifier : CK (10/26/20 1420)          Goals  Short term goals  Time Frame for Short term goals: To be met by DC. - all goals ongoing 10/26/20  Short term goal 1: Pt to perform bed mob with supervision. (Not met)  Short term goal 2: Pt to perform transfers with SBA.   (Not met)  Short term goal 3: Pt to amb 20 ft with RW and min A.  - goal met 10/26/20; updated: Pt. will ambulate >/= 50ft with CGA and LRAD - ongoing  Long term goals  Time Frame for Long term goals : LTGs=STGs  Patient Goals   Patient goals : \"to get stronger and be able to return home independently\"    Plan    Plan  Times per week: 3-5x/week  Times per day: Daily  Current Treatment Recommendations: Strengthening, Balance Training, Functional Mobility Training, Transfer Training, ADL/Self-care Training, Endurance Training, Gait Training, Patient/Caregiver Education & Training, Equipment Evaluation, Education, & procurement, Safety Education & Training, Home Exercise Program  Safety Devices  Type of devices: Call light within reach, Chair alarm in place, Gait belt, Left in chair, Nurse notified  Restraints  Initially in place: No     Therapy Time   Individual Concurrent Group Co-treatment   Time In 1328         Time Out 1410         Minutes 42                 Timed Code Treatment Minutes: 42 minutes    Total Treatment Minutes: 42 Minutes    If patient discharges prior to next treatment, this note will serve as discharge summary.     Dee Dickerson PT, DPT #[id number]

## 2020-10-26 NOTE — PROGRESS NOTES
[facility] PROGRESS NOTE    10/25/20      Name: Javier Barboza . Admitted: 10/16/2020  Primary Care Provider: Ana Aparicio MD (Tel: [phone])      Subjective:  .     79 yo female with hx HTN, SVT, pancreatic cancer, hypothyroidism. She presented to hospital with epigastric/RUQ abdominal pain associated with n/v.      Procedure(s) 10/18/2020 by Dr Barnett Common:  ERCP STENT REMOVAL/EXCHANGE  ERCP BIOPSY     Procedure(s) 10/21/2020 by Dr Barnett Common:  ERCP ENDOSCOPIC RETROGRADE CHOLANGIOPANCREATOGRAPHY  EGD BILIARY STENT REMOVAL  EGD DUODENAL STENT PLACEMENT 22MMX 80 MM WALLFLEX  EGD DUODENAL DILATION WITH 20 MM  BALLOON     Biliary stent removed and duodenal stent placement - only full liquid diet so stent does not get obstructed. Bacteremia and sepsis POA based on klebsiella and streptococcus in blood - likely related to biliary stent obstruction and sepsis. On ceftriaxone and flagyl. Has chronic diastolic CHF with volume overload on IV lasix. AFib with RVR likely due ot sepsis, now improved with controlled rate. Has dilated ascending aorta - could consider referral to vascular, however she is a poor surgical candidate due to pancreatic cancer, age and overall poor prognosis. She is an appropriate candidate for hospice. Not anticoagulation candidate due to risk in setting of pancreatic cancer and age. She just wants to go. Sepsis POA.     Reviewed interval ancillary notes    Current Medications  diphenhydrAMINE (BENADRYL) tablet 25 mg, Q6H PRN  furosemide (LASIX) injection 20 mg, Daily  dilTIAZem (CARDIZEM) tablet 60 mg, 3 times per day  morphine (PF) injection 2 mg, Q3H PRN  cefTRIAXone (ROCEPHIN) 2 g IVPB in D5W 50ml minibag, Q24H  metronidazole (FLAGYL) 500 mg in NaCl 100 mL IVPB premix, Q8H  docosanol (ABREVA) 10 % cream, 5x Daily  perflutren lipid microspheres (DEFINITY) injection 1.65 mg, ONCE PRN  sodium chloride flush 0.9 % injection 10 mL, 2 times per day  HYDROmorphone HCl PF (DILAUDID) injection 0.5 mg, Once  metoprolol tartrate (LOPRESSOR) tablet 12.5 mg, BID  acetaminophen (TYLENOL) tablet 650 mg, Q6H PRN    Or  acetaminophen (TYLENOL) suppository 650 mg, Q6H PRN  polyethylene glycol (GLYCOLAX) packet 17 g, Daily PRN  promethazine (PHENERGAN) tablet 12.5 mg, Q6H PRN    Or  ondansetron (ZOFRAN) injection 4 mg, Q6H PRN  potassium chloride (KLOR-CON M) extended release tablet 40 mEq, PRN    Or  potassium bicarb-citric acid (EFFER-K) effervescent tablet 40 mEq, PRN    Or  potassium chloride 10 mEq/100 mL IVPB (Peripheral Line), PRN  magnesium sulfate 2 g in 50 mL IVPB premix, PRN  levothyroxine (SYNTHROID) tablet 100 mcg, Daily  pantoprazole (PROTONIX) injection 40 mg, Daily        Objective:  /80   Pulse 73   Temp 98.6 °F (37 °C) (Oral)   Resp 18   Ht 5' 6\" (1.676 m)   Wt 160 lb 14.4 oz (73 kg)   SpO2 95%   BMI 25.97 kg/m²     Intake/Output Summary (Last 24 hours) at 10/26/2020 0156  Last data filed at 10/25/2020 2047  Gross per 24 hour   Intake 740 ml   Output 1400 ml   Net -660 ml      Wt Readings from Last 3 Encounters:   10/24/20 160 lb 14.4 oz (73 kg)   06/09/19 145 lb (65.8 kg)   12/10/18 145 lb (65.8 kg)       General appearance:  Appears comfortable  Eyes: Sclera clear. Pupils equal.  ENT: Moist oral mucosa. Trachea midline, no adenopathy. Cardiovascular: Regular rhythm, normal S1, S2. No murmur. No edema in lower extremities  Respiratory: Not using accessory muscles. Good inspiratory effort. Clear to auscultation bilaterally, no wheeze or crackles. GI: Abdomen soft, no tenderness, not distended, normal bowel sounds  Musculoskeletal: No cyanosis in digits, neck supple  Neurology: CN 2-12 grossly intact. No speech or motor deficits  Psych: Normal affect.  Alert and oriented in time, place and person  Skin: Warm, dry, normal turgor    Labs and Tests:  CBC:   Recent Labs 10/23/20  0549 10/24/20  0639 10/25/20  0708   WBC 7.9 6.3 7.7   HGB 12.3 11.9* 11.9*    232 267     BMP:    Recent Labs     10/23/20  0549 10/24/20  0639 10/25/20  0708    136 135*   K 3.4* 3.9 3.9    101 100   CO2 26 22 26   BUN 15 13 12   CREATININE 0.5* 0.6 <0.5*   GLUCOSE 137* 108* 108*     Hepatic:   Recent Labs     10/23/20  0549 10/24/20  0639 10/25/20  0708   AST 30 28 36   ALT 41* 34 32   BILITOT 4.0* 3.1* 2.6*   ALKPHOS 406* 362* 398*       Discussed care with family  Discussed  with Dr. GARIBAY viewed CXR images and EKG tracings   Discussed film/CT with reading physician      Spent  minutes with patient and family at bedside and on unit reviewing medical records and labs, spent greater than 50% time counseling patient and family on  and/or coordinating care with     Problem List  Principal Problem:    Pancreatic cancer ([facility] [address]  Active Problems:    Biliary stent obstruction, initial encounter    Pneumobilia    Bacteremia due to Klebsiella pneumoniae    Streptococcal bacteremia    Essential hypertension    Transaminitis    Jaundice    Ascending aorta dilation (HCC)    Chronic atrial fibrillation (HCC)    Chronic diastolic heart failure ([facility] [address]    Sepsis ([facility] [address]  Resolved Problems:    * No resolved hospital problems. *       Assessment & Plan:   1. Pancreatic cancer with duodenal and biliary obstruction - hospice appopriate - family wants to speak with palliative care  2. Bacteremia with klebsiella and streptococcus, sepsis POA - on IV abx - sepsis POA based on leukocytosis, documented bacteremia, transaminitis, hypotension, fever, tachypnea  3. Chronic diastolic CHF - compensated at present - on lasix IV daily  4. Afib - now rate controlled - not anticoagulation candidate  5.   Dilated ascending aorta - would not recommend referral to vascular as outpatient as she is a poor surgical candidate due to age and pancreatic cancer - not much to do unfortunately         Diet: DIET FULL LIQUID;  Dietary Nutrition Supplements: Low Calorie High Protein Supplement  Code:Full Code  DVT PPX      Dave Fox MD   10/25/20

## 2020-10-26 NOTE — PROGRESS NOTES
Pt in bed resting incont of urine at this time . pericare given. Morning reassessment completed, patient denies needs at this time, call light in reach, will continue to monitor.    Electronically signed by Angel Bray RN on 10/26/2020 at 1:40 AM

## 2020-10-26 NOTE — PROGRESS NOTES
[facility]  Gastroenterology Progress Note  Kaitlin Nicole is a 80 y.o. female patient. 1. Biliary stent obstruction, initial encounter    2. Malignant neoplasm of pancreas, unspecified location of malignancy ([address]    3. Pneumobilia    4. Obstructive jaundice        SUBJECTIVE:  Again Continues tolerate full liquids with no n/v, no abd pain. Tired today after physical therapy. Physical    VITALS:  /78   Pulse 98   Temp 98.6 °F (37 °C) (Oral)   Resp 18   Ht 5' 6\" (1.676 m)   Wt 160 lb 14.4 oz (73 kg)   SpO2 98%   BMI 25.97 kg/m²   TEMPERATURE:  Current - Temp: 98.6 °F (37 °C); Max - Temp  Av.2 °F (36.8 °C)  Min: 97.8 °F (36.6 °C)  Max: 98.6 °F (37 °C)    Abdomen remains soft, ND, NT,  Bowel sounds normal   L: cta  + icteric and jaundiced  nad    Data      Recent Labs     10/24/20  0639 10/25/20  0708 10/26/20  0528   WBC 6.3 7.7 7.1   HGB 11.9* 11.9* 11.2*   HCT 35.4* 35.7* 33.8*   MCV 91.0 91.3 90.6    267 263     Recent Labs     10/24/20  0639 10/25/20  0708 10/26/20  0528    135* 136   K 3.9 3.9 3.5    100 99   CO2 22 26 30   BUN 13 12 13   CREATININE 0.6 <0.5* 0.6     Recent Labs     10/24/20  0639 10/25/20  0708 10/26/20  0528   AST 28 36 27   ALT 34 32 28   BILITOT 3.1* 2.6* 2.3*   ALKPHOS 362* 398* 394*     Recent Labs     10/24/20  0639 10/25/20  0708 10/26/20  0528   LIPASE 239.0* 134.0* 145.0*             ASSESSMENT   1. Cholangitis- now s/p removal of biliary stents due to need to place duodenal stent for duodenal stenosis. She is afebrile and wbc is normal.  Bili improved now to 2.3  3. 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 tolerated full liquids last 4 days. 2. Pancreatic cancer- oncology note noted. Palliative care consult has been placed. 3. Bacteremia- as noted in ID note        PLAN    1. Since the bilirubin continues improving will hold off on PTC for biliary drainage.   Would

## 2020-10-26 NOTE — CARE COORDINATION
Family -son Ronny Alexander and his spouse  met with palliative care Nurse regarding d/c plan of care. CM manager was updated by Ronny Alexander that the family's decision is that patient to go to [facility] for a short period of time and then to home with him . The Palliative care Nurse also talked to the patient regarding the d/c plan of care as per family. CM sent a referral to [facility] and also called the admissions, left a call back number.

## 2020-10-27 VITALS
DIASTOLIC BLOOD PRESSURE: 70 MMHG | OXYGEN SATURATION: 92 % | RESPIRATION RATE: 14 BRPM | BODY MASS INDEX: 27.06 KG/M2 | HEIGHT: 66 IN | SYSTOLIC BLOOD PRESSURE: 117 MMHG | TEMPERATURE: 98.9 F | HEART RATE: 88 BPM | WEIGHT: 168.4 LBS

## 2020-10-27 LAB
A/G RATIO: 0.6 (ref 1.1–2.2)
ALBUMIN SERPL-MCNC: 2.3 G/DL (ref 3.4–5)
ALP BLD-CCNC: 514 U/L (ref 40–129)
ALT SERPL-CCNC: 25 U/L (ref 10–40)
ANION GAP SERPL CALCULATED.3IONS-SCNC: 9 MMOL/L (ref 3–16)
AST SERPL-CCNC: <5 U/L (ref 15–37)
BASOPHILS ABSOLUTE: 0 K/UL (ref 0–0.2)
BASOPHILS RELATIVE PERCENT: 0.3 %
BILIRUB SERPL-MCNC: 2.2 MG/DL (ref 0–1)
BUN BLDV-MCNC: 10 MG/DL (ref 7–20)
CALCIUM SERPL-MCNC: 8.2 MG/DL (ref 8.3–10.6)
CHLORIDE BLD-SCNC: 97 MMOL/L (ref 99–110)
CO2: 29 MMOL/L (ref 21–32)
CREAT SERPL-MCNC: 0.5 MG/DL (ref 0.6–1.2)
EOSINOPHILS ABSOLUTE: 0.1 K/UL (ref 0–0.6)
EOSINOPHILS RELATIVE PERCENT: 1 %
GFR AFRICAN AMERICAN: >60
GFR NON-AFRICAN AMERICAN: >60
GLOBULIN: 3.8 G/DL
GLUCOSE BLD-MCNC: 145 MG/DL (ref 70–99)
HCT VFR BLD CALC: 32.4 % (ref 36–48)
HEMOGLOBIN: 10.8 G/DL (ref 12–16)
LIPASE: 165 U/L (ref 13–60)
LYMPHOCYTES ABSOLUTE: 0.8 K/UL (ref 1–5.1)
LYMPHOCYTES RELATIVE PERCENT: 8.6 %
MCH RBC QN AUTO: 29.7 PG (ref 26–34)
MCHC RBC AUTO-ENTMCNC: 33.3 G/DL (ref 31–36)
MCV RBC AUTO: 89.3 FL (ref 80–100)
MONOCYTES ABSOLUTE: 0.7 K/UL (ref 0–1.3)
MONOCYTES RELATIVE PERCENT: 7.8 %
NEUTROPHILS ABSOLUTE: 7.4 K/UL (ref 1.7–7.7)
NEUTROPHILS RELATIVE PERCENT: 82.3 %
PDW BLD-RTO: 13.2 % (ref 12.4–15.4)
PLATELET # BLD: 331 K/UL (ref 135–450)
PMV BLD AUTO: 9.8 FL (ref 5–10.5)
POTASSIUM SERPL-SCNC: 3.9 MMOL/L (ref 3.5–5.1)
RBC # BLD: 3.63 M/UL (ref 4–5.2)
SARS-COV-2, NAAT: NOT DETECTED
SODIUM BLD-SCNC: 135 MMOL/L (ref 136–145)
TOTAL PROTEIN: 6.1 G/DL (ref 6.4–8.2)
WBC # BLD: 9 K/UL (ref 4–11)

## 2020-10-27 PROCEDURE — 6360000002 HC RX W HCPCS: Performed by: INTERNAL MEDICINE

## 2020-10-27 PROCEDURE — 2500000003 HC RX 250 WO HCPCS: Performed by: INTERNAL MEDICINE

## 2020-10-27 PROCEDURE — U0002 COVID-19 LAB TEST NON-CDC: HCPCS

## 2020-10-27 PROCEDURE — 2580000003 HC RX 258: Performed by: INTERNAL MEDICINE

## 2020-10-27 PROCEDURE — 6370000000 HC RX 637 (ALT 250 FOR IP): Performed by: NURSE PRACTITIONER

## 2020-10-27 PROCEDURE — 85025 COMPLETE CBC W/AUTO DIFF WBC: CPT

## 2020-10-27 PROCEDURE — 36415 COLL VENOUS BLD VENIPUNCTURE: CPT

## 2020-10-27 PROCEDURE — 6370000000 HC RX 637 (ALT 250 FOR IP): Performed by: INTERNAL MEDICINE

## 2020-10-27 PROCEDURE — 83690 ASSAY OF LIPASE: CPT

## 2020-10-27 PROCEDURE — 80053 COMPREHEN METABOLIC PANEL: CPT

## 2020-10-27 PROCEDURE — 6360000002 HC RX W HCPCS: Performed by: NURSE PRACTITIONER

## 2020-10-27 PROCEDURE — C9113 INJ PANTOPRAZOLE SODIUM, VIA: HCPCS | Performed by: INTERNAL MEDICINE

## 2020-10-27 RX ORDER — LEVOTHYROXINE SODIUM 0.1 MG/1
100 TABLET ORAL DAILY
Qty: 30 TABLET | Refills: 3 | Status: SHIPPED | OUTPATIENT
Start: 2020-10-28

## 2020-10-27 RX ORDER — DILTIAZEM HYDROCHLORIDE 60 MG/1
60 TABLET, FILM COATED ORAL EVERY 8 HOURS SCHEDULED
Qty: 120 TABLET | Refills: 3 | Status: ON HOLD | OUTPATIENT
Start: 2020-10-27 | End: 2020-11-24 | Stop reason: HOSPADM

## 2020-10-27 RX ORDER — AMOXICILLIN AND CLAVULANATE POTASSIUM 500; 125 MG/1; MG/1
1 TABLET, FILM COATED ORAL EVERY 12 HOURS SCHEDULED
Status: DISCONTINUED | OUTPATIENT
Start: 2020-10-27 | End: 2020-10-27 | Stop reason: HOSPADM

## 2020-10-27 RX ORDER — AMOXICILLIN AND CLAVULANATE POTASSIUM 500; 125 MG/1; MG/1
1 TABLET, FILM COATED ORAL EVERY 12 HOURS SCHEDULED
Qty: 20 TABLET | Refills: 0 | Status: SHIPPED | OUTPATIENT
Start: 2020-10-27 | End: 2020-11-06

## 2020-10-27 RX ORDER — FUROSEMIDE 40 MG/1
40 TABLET ORAL DAILY
Qty: 60 TABLET | Refills: 3 | Status: ON HOLD | OUTPATIENT
Start: 2020-10-27 | End: 2020-11-24 | Stop reason: HOSPADM

## 2020-10-27 RX ORDER — POLYETHYLENE GLYCOL 3350 17 G/17G
17 POWDER, FOR SOLUTION ORAL DAILY PRN
Qty: 527 G | Refills: 1 | Status: SHIPPED | OUTPATIENT
Start: 2020-10-27 | End: 2020-11-26

## 2020-10-27 RX ADMIN — ACETAMINOPHEN 650 MG: 325 TABLET ORAL at 15:05

## 2020-10-27 RX ADMIN — MORPHINE SULFATE 2 MG: 2 INJECTION, SOLUTION INTRAMUSCULAR; INTRAVENOUS at 02:20

## 2020-10-27 RX ADMIN — METRONIDAZOLE 500 MG: 500 INJECTION, SOLUTION INTRAVENOUS at 06:14

## 2020-10-27 RX ADMIN — LEVOTHYROXINE SODIUM 100 MCG: 0.1 TABLET ORAL at 06:14

## 2020-10-27 RX ADMIN — FUROSEMIDE 20 MG: 10 INJECTION, SOLUTION INTRAMUSCULAR; INTRAVENOUS at 09:35

## 2020-10-27 RX ADMIN — DOCOSANOL: 100 CREAM TOPICAL at 05:00

## 2020-10-27 RX ADMIN — Medication 10 ML: at 09:35

## 2020-10-27 RX ADMIN — DILTIAZEM HYDROCHLORIDE 60 MG: 30 TABLET, FILM COATED ORAL at 06:14

## 2020-10-27 RX ADMIN — METOPROLOL TARTRATE 12.5 MG: 25 TABLET, FILM COATED ORAL at 09:35

## 2020-10-27 RX ADMIN — PANTOPRAZOLE SODIUM 40 MG: 40 INJECTION, POWDER, FOR SOLUTION INTRAVENOUS at 09:35

## 2020-10-27 RX ADMIN — DILTIAZEM HYDROCHLORIDE 60 MG: 30 TABLET, FILM COATED ORAL at 15:03

## 2020-10-27 ASSESSMENT — PAIN DESCRIPTION - DESCRIPTORS: DESCRIPTORS: ACHING

## 2020-10-27 ASSESSMENT — PAIN DESCRIPTION - PAIN TYPE: TYPE: ACUTE PAIN

## 2020-10-27 ASSESSMENT — PAIN SCALES - GENERAL
PAINLEVEL_OUTOF10: 0
PAINLEVEL_OUTOF10: 0
PAINLEVEL_OUTOF10: 10
PAINLEVEL_OUTOF10: 0
PAINLEVEL_OUTOF10: 5
PAINLEVEL_OUTOF10: 0
PAINLEVEL_OUTOF10: 5
PAINLEVEL_OUTOF10: 0
PAINLEVEL_OUTOF10: 0

## 2020-10-27 ASSESSMENT — PAIN DESCRIPTION - ONSET: ONSET: GRADUAL

## 2020-10-27 ASSESSMENT — PAIN DESCRIPTION - FREQUENCY: FREQUENCY: INTERMITTENT

## 2020-10-27 ASSESSMENT — PAIN DESCRIPTION - LOCATION: LOCATION: BACK

## 2020-10-27 NOTE — PROGRESS NOTES
Hospitalist Progress Note      PCP: Heidi Solano MD    Date of Admission: 10/16/2020    Chief Complaint: Epigastric pain? RUQ pain     Hospital Course:   81 yo female with hx HTN, SVT, pancreatic cancer, hypothyroidism. She presented to hospital with epigastric/RUQ abdominal pain associated with n/v.      Procedure(s) 10/18/2020 by Dr Yaron Jimenes:  ERCP STENT REMOVAL/EXCHANGE  ERCP BIOPSY     Procedure(s) 10/21/2020 by Dr Yaron Jimenes:  ERCP ENDOSCOPIC RETROGRADE CHOLANGIOPANCREATOGRAPHY  EGD BILIARY STENT REMOVAL  EGD DUODENAL STENT PLACEMENT 22MMX 80 MM WALLFLEX  EGD DUODENAL DILATION WITH 20 MM  BALLOON     Biliary stent removed and duodenal stent placement - only full liquid diet so stent does not get obstructed.  Bacteremia and sepsis POA based on klebsiella and streptococcus in blood - likely related to biliary stent obstruction and sepsis.  On ceftriaxone and flagyl.  Has chronic diastolic CHF with volume overload on IV lasix.  AFib with RVR likely due ot sepsis, now improved with controlled rate. Has dilated ascending aorta - could consider referral to vascular, however she is a poor surgical candidate due to pancreatic cancer, age and overall poor prognosis. Macy Spears is an appropriate candidate for hospice.   Not anticoagulation candidate due to risk in setting of pancreatic cancer and age. She just wants to go.  Sepsis POA. Subjective: EMR and notes reviewed, pt seen and examined.  No acute events overnight   Medications:  Reviewed    Infusion Medications   Scheduled Medications    furosemide  20 mg Intravenous Daily    dilTIAZem  60 mg Oral 3 times per day    cefTRIAXone (ROCEPHIN) IV  2 g Intravenous Q24H    metroNIDAZOLE  500 mg Intravenous Q8H    docosanol   Topical 5x Daily    sodium chloride flush  10 mL Intravenous 2 times per day    HYDROmorphone  0.5 mg Intravenous Once    metoprolol tartrate  12.5 mg Oral BID    levothyroxine  100 mcg Oral Daily    pantoprazole  40 mg Intravenous Daily     PRN Meds: diphenhydrAMINE, morphine, perflutren lipid microspheres, acetaminophen **OR** acetaminophen, polyethylene glycol, promethazine **OR** ondansetron, potassium chloride **OR** potassium alternative oral replacement **OR** potassium chloride, magnesium sulfate    No intake or output data in the 24 hours ending 10/27/20 0859    Physical Exam Performed:    /68   Pulse 63   Temp 97.4 °F (36.3 °C) (Oral)   Resp 16   Ht 5' 6\" (1.676 m)   Wt 168 lb 6.4 oz (76.4 kg)   SpO2 92%   BMI 27.18 kg/m²     General appearance: No apparent distress, appears stated age and cooperative. HEENT: Pupils equal, round, and reactive to light. Conjunctivae/corneas clear. Neck: Supple, with full range of motion. No jugular venous distention. Trachea midline. Respiratory:  Normal respiratory effort. Clear to auscultation, bilaterally without Rales/Wheezes/Rhonchi. Cardiovascular: Regular rate and rhythm with normal S1/S2 without murmurs, rubs or gallops. Abdomen: Soft, non-tender, non-distended with normal bowel sounds. Musculoskeletal: No clubbing, cyanosis or edema bilaterally. Full range of motion without deformity. Skin: Skin color, texture, turgor normal.  No rashes or lesions. Neurologic:  Neurovascularly intact without any focal sensory/motor deficits.  Cranial nerves: II-XII intact, grossly non-focal.  Psychiatric: Alert and oriented, thought content appropriate, normal insight  Capillary Refill: Brisk,< 3 seconds   Peripheral Pulses: +2 palpable, equal bilaterally       Labs:   Recent Labs     10/25/20  0708 10/26/20  0528 10/27/20  0527   WBC 7.7 7.1 9.0   HGB 11.9* 11.2* 10.8*   HCT 35.7* 33.8* 32.4*    263 331     Recent Labs     10/25/20  0708 10/26/20  0528 10/27/20  0527   * 136 135*   K 3.9 3.5 3.9    99 97*   CO2 26 30 29   BUN 12 13 10   CREATININE <0.5* 0.6 0.5*   CALCIUM 7.9* 7.8* 8.2*     Recent Labs     10/25/20  0708 10/26/20  0528 10/27/20  0527   AST 36 27 <5*   ALT 32 28 25   BILITOT 2.6* 2.3* 2.2*   ALKPHOS 398* 394* 514*     No results for input(s): INR in the last 72 hours. No results for input(s): Poppy Basques in the last 72 hours. Urinalysis:      Lab Results   Component Value Date    NITRU Negative 06/08/2019    WBCUA 0 06/08/2019    BACTERIA 1+ 03/13/2019    RBCUA 5 06/08/2019    BLOODU Negative 06/08/2019    SPECGRAV 1.010 06/08/2019    GLUCOSEU Negative 06/08/2019       Radiology:  FL ERCP BILIARY AND PANCREATIC S&I   Final Result   Placement of expandable wire stent in the common duct. Please refer to the   procedure report for further details. XR CHEST (2 VW)   Final Result   Small right-sided pleural effusion and right basilar airspace disease. Findings slightly increased compared to prior study. Mild increased interstitial opacities which may be related bronchovascular   crowding from low lung volumes versus developing edema. CT ABDOMEN PELVIS W IV CONTRAST Additional Contrast? Oral   Final Result   Interval common bile duct stent exchange, with the new stent folded in the   common bile duct and the distal portion terminating within the 2nd portion of   the duodenum. Extensive pneumobilia with ongoing intra and extrahepatic bile duct   dilatation. Heterogeneous hepatic parenchymal enhancement may relate to cholangitis, as   described on operative report. No peter hepatic abscess. Features of volume overload, with small volume abdominal ascites, bilateral   pleural effusions and mild body wall anasarca. XR CHEST PORTABLE   Final Result   Right greater than left bibasilar pulmonary opacities with possible trace   bilateral effusions. In the setting of cardiomegaly this may be on the basis   of pulmonary edema/positive fluid balance, though multilobar infection with   parapneumonic effusions is a consideration in the appropriate clinical   setting.          FL ERCP BILIARY AND PANCREATIC S&I Final Result   Unremarkable ERCP images. Please refer to the procedure report for further   details. CT ABDOMEN PELVIS W IV CONTRAST Additional Contrast? None   Final Result   Interval development of moderate intra and extrahepatic biliary dilation,   increased pneumobilia and pancreatic ductal dilation. This likely represents   malfunction of the distal common bile duct stent. Recommend GI consultation. Gastric and hiatal hernia distention with fluid. There is transition near   the duodenal junction with poor definition of the duodenum. This could be   reactive with resultant relative gastric outlet obstruction. Cholelithiasis with mild gallbladder distention with fluid, air and mild   surrounding pericholecystic edema possible cholecystitis. Gross deformity with severe canal stenosis L2-3 with prior compression   fractures and vertebral augmentation. This is unchanged.                  Assessment/Plan:    Active Hospital Problems    Diagnosis    Sepsis ([facility] [address] [A41.9]    Chronic atrial fibrillation (HCC) [I48.20]    Chronic diastolic heart failure (HCC) [I50.32]    Ascending aorta dilation (HCC) [I77.810]    Jaundice [R17]    Pneumobilia [K83.8]    Bacteremia due to Klebsiella pneumoniae [R78.81, B96.1]    Streptococcal bacteremia [R78.81, B95.5]    Essential hypertension [I10]    Transaminitis [R74.01]    Biliary stent obstruction, initial encounter [T85.590A]    Pancreatic cancer ([facility] [address] [C25.9]     1.  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  Bacteremia with klebsiella and streptococcus, sepsis POA - on IV abx - sepsis POA based on leukocytosis, documented bacteremia, transaminitis, hypotension, fever, tachypnea    3.  Chronic diastolic CHF - compensated at present - on lasix IV daily    4.  Afib - now rate controlled - not anticoagulation candidate    5.  Dilated ascending aorta - would not recommend referral to vascular as outpatient as she is a poor surgical candidate due to age and pancreatic cancer - not much to do unfortunately       DVT Prophylaxis: none 2/2 to above   Diet: DIET FULL LIQUID;  Dietary Nutrition Supplements: Low Calorie High Protein Supplement, Standard High Calorie Oral Supplement  Code Status: Full Code    PT/OT Eval Status: active and ongoing     Dispo - Pending palliative care discussion, medically ready for DC  Family decided to SNF placement awaiting precert     AME Boogie - CNP

## 2020-10-27 NOTE — PROGRESS NOTES
Hospitalist Progress Note      PCP: Francisco Javier Guerra MD    Date of Admission: 10/16/2020    Chief Complaint: Epigastric pain? RUQ pain     Hospital Course:   81 yo female with hx HTN, SVT, pancreatic cancer, hypothyroidism. She presented to hospital with epigastric/RUQ abdominal pain associated with n/v.      Procedure(s) 10/18/2020 by Dr Sonal Oviedo:  ERCP STENT REMOVAL/EXCHANGE  ERCP BIOPSY     Procedure(s) 10/21/2020 by Dr Sonal Oviedo:  ERCP ENDOSCOPIC RETROGRADE CHOLANGIOPANCREATOGRAPHY  EGD BILIARY STENT REMOVAL  EGD DUODENAL STENT PLACEMENT 22MMX 80 MM WALLFLEX  EGD DUODENAL DILATION WITH 20 MM  BALLOON     Biliary stent removed and duodenal stent placement - only full liquid diet so stent does not get obstructed.  Bacteremia and sepsis POA based on klebsiella and streptococcus in blood - likely related to biliary stent obstruction and sepsis.  On ceftriaxone and flagyl.  Has chronic diastolic CHF with volume overload on IV lasix.  AFib with RVR likely due ot sepsis, now improved with controlled rate. Has dilated ascending aorta - could consider referral to vascular, however she is a poor surgical candidate due to pancreatic cancer, age and overall poor prognosis. Rosa Moreno is an appropriate candidate for hospice.   Not anticoagulation candidate due to risk in setting of pancreatic cancer and age. She just wants to go.  Sepsis POA. Subjective: EMR and notes reviewed, pt seen and examined. Sitting up in chair, no complaints.  Tells me \" I dont have dementia or alzheimer's I have my mind and feel Ok and want to go home\"       Medications:  Reviewed    Infusion Medications   Scheduled Medications    furosemide  20 mg Intravenous Daily    dilTIAZem  60 mg Oral 3 times per day    cefTRIAXone (ROCEPHIN) IV  2 g Intravenous Q24H    metroNIDAZOLE  500 mg Intravenous Q8H    docosanol   Topical 5x Daily    sodium chloride flush  10 mL Intravenous 2 times per day    HYDROmorphone  0.5 mg Intravenous Once    metoprolol tartrate  12.5 mg Oral BID    levothyroxine  100 mcg Oral Daily    pantoprazole  40 mg Intravenous Daily     PRN Meds: diphenhydrAMINE, morphine, perflutren lipid microspheres, acetaminophen **OR** acetaminophen, polyethylene glycol, promethazine **OR** ondansetron, potassium chloride **OR** potassium alternative oral replacement **OR** potassium chloride, magnesium sulfate    No intake or output data in the 24 hours ending 10/26/20 4713    Physical Exam Performed:    /80   Pulse 99   Temp 98.1 °F (36.7 °C) (Oral)   Resp 18   Ht 5' 6\" (1.676 m)   Wt 160 lb 14.4 oz (73 kg)   SpO2 93%   BMI 25.97 kg/m²     General appearance: No apparent distress, appears stated age and cooperative. HEENT: Pupils equal, round, and reactive to light. Conjunctivae/corneas clear. Neck: Supple, with full range of motion. No jugular venous distention. Trachea midline. Respiratory:  Normal respiratory effort. Clear to auscultation, bilaterally without Rales/Wheezes/Rhonchi. Cardiovascular: Regular rate and rhythm with normal S1/S2 without murmurs, rubs or gallops. Abdomen: Soft, non-tender, non-distended with normal bowel sounds. Musculoskeletal: No clubbing, cyanosis or edema bilaterally. Full range of motion without deformity. Skin: Skin color, texture, turgor normal.  No rashes or lesions. Neurologic:  Neurovascularly intact without any focal sensory/motor deficits.  Cranial nerves: II-XII intact, grossly non-focal.  Psychiatric: Alert and oriented, thought content appropriate, normal insight  Capillary Refill: Brisk,< 3 seconds   Peripheral Pulses: +2 palpable, equal bilaterally       Labs:   Recent Labs     10/24/20  0639 10/25/20  0708 10/26/20  0528   WBC 6.3 7.7 7.1   HGB 11.9* 11.9* 11.2*   HCT 35.4* 35.7* 33.8*    267 263     Recent Labs     10/24/20  0639 10/25/20  0708 10/26/20  0528    135* 136   K 3.9 3.9 3.5    100 99   CO2 22 26 30   BUN 13 12 13   CREATININE 0.6 <0.5* 0.6   CALCIUM 8.1* 7.9* 7.8*     Recent Labs     10/24/20  0639 10/25/20  0708 10/26/20  0528   AST 28 36 27   ALT 34 32 28   BILITOT 3.1* 2.6* 2.3*   ALKPHOS 362* 398* 394*     No results for input(s): INR in the last 72 hours. No results for input(s): Connee Matar in the last 72 hours. Urinalysis:      Lab Results   Component Value Date    NITRU Negative 06/08/2019    WBCUA 0 06/08/2019    BACTERIA 1+ 03/13/2019    RBCUA 5 06/08/2019    BLOODU Negative 06/08/2019    SPECGRAV 1.010 06/08/2019    GLUCOSEU Negative 06/08/2019       Radiology:  FL ERCP BILIARY AND PANCREATIC S&I   Final Result   Placement of expandable wire stent in the common duct. Please refer to the   procedure report for further details. XR CHEST (2 VW)   Final Result   Small right-sided pleural effusion and right basilar airspace disease. Findings slightly increased compared to prior study. Mild increased interstitial opacities which may be related bronchovascular   crowding from low lung volumes versus developing edema. CT ABDOMEN PELVIS W IV CONTRAST Additional Contrast? Oral   Final Result   Interval common bile duct stent exchange, with the new stent folded in the   common bile duct and the distal portion terminating within the 2nd portion of   the duodenum. Extensive pneumobilia with ongoing intra and extrahepatic bile duct   dilatation. Heterogeneous hepatic parenchymal enhancement may relate to cholangitis, as   described on operative report. No peter hepatic abscess. Features of volume overload, with small volume abdominal ascites, bilateral   pleural effusions and mild body wall anasarca. XR CHEST PORTABLE   Final Result   Right greater than left bibasilar pulmonary opacities with possible trace   bilateral effusions.   In the setting of cardiomegaly this may be on the basis   of pulmonary edema/positive fluid balance, though multilobar infection with   parapneumonic effusions is a consideration in the appropriate clinical   setting. FL ERCP BILIARY AND PANCREATIC S&I   Final Result   Unremarkable ERCP images. Please refer to the procedure report for further   details. CT ABDOMEN PELVIS W IV CONTRAST Additional Contrast? None   Final Result   Interval development of moderate intra and extrahepatic biliary dilation,   increased pneumobilia and pancreatic ductal dilation. This likely represents   malfunction of the distal common bile duct stent. Recommend GI consultation. Gastric and hiatal hernia distention with fluid. There is transition near   the duodenal junction with poor definition of the duodenum. This could be   reactive with resultant relative gastric outlet obstruction. Cholelithiasis with mild gallbladder distention with fluid, air and mild   surrounding pericholecystic edema possible cholecystitis. Gross deformity with severe canal stenosis L2-3 with prior compression   fractures and vertebral augmentation. This is unchanged.                  Assessment/Plan:    Active Hospital Problems    Diagnosis    Sepsis ([facility] [address] [A41.9]    Chronic atrial fibrillation (HCC) [I48.20]    Chronic diastolic heart failure (HCC) [I50.32]    Ascending aorta dilation (HCC) [I77.810]    Jaundice [R17]    Pneumobilia [K83.8]    Bacteremia due to Klebsiella pneumoniae [R78.81, B96.1]    Streptococcal bacteremia [R78.81, B95.5]    Essential hypertension [I10]    Transaminitis [R74.01]    Biliary stent obstruction, initial encounter [T85.590A]    Pancreatic cancer ([facility] [address] [C25.9]     1.  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  Bacteremia with klebsiella and streptococcus, sepsis POA - on IV abx - sepsis POA based on leukocytosis, documented bacteremia, transaminitis, hypotension, fever, tachypnea    3.  Chronic diastolic CHF - compensated at present - on lasix IV daily    4.  Afib - now rate controlled - not anticoagulation candidate    5.  Dilated ascending aorta - would not recommend referral to vascular as outpatient as she is a poor surgical candidate due to age and pancreatic cancer - not much to do unfortunately       DVT Prophylaxis: none 2/2 to above   Diet: DIET FULL LIQUID;  Dietary Nutrition Supplements: Low Calorie High Protein Supplement  Code Status: Full Code    PT/OT Eval Status: active and ongoing     Dispo - Pending palliative care discussion     Theresa Mcbride, APRN - CNP

## 2020-10-27 NOTE — CARE COORDINATION
Patient's son - Heather Carr called  back stating that the family is okay with patient going back to [facility].

## 2020-10-27 NOTE — DISCHARGE SUMMARY
Hospital Medicine Discharge Summary    Patient ID: Dale Phipps      Patient's PCP: Edil Fagan MD    Admit Date: 10/16/2020     Discharge Date:   10/27/2020    Admitting Physician: Jeannette Dunaway MD     Discharge Physician: Riaz St APRN - CNP     Discharge Diagnoses: Active Hospital Problems    Diagnosis    Sepsis ([facility] [address] [A41.9]    Chronic atrial fibrillation (HCC) [I48.20]    Chronic diastolic heart failure (HCC) [I50.32]    Ascending aorta dilation (HCC) [I77.810]    Jaundice [R17]    Pneumobilia [K83.8]    Bacteremia due to Klebsiella pneumoniae [R78.81, B96.1]    Streptococcal bacteremia [R78.81, B95.5]    Essential hypertension [I10]    Transaminitis [R74.01]    Biliary stent obstruction, initial encounter [T85.590A]    Pancreatic cancer ([facility] [address] [C25.9]       The patient was seen and examined on day of discharge and this discharge summary is in conjunction with any daily progress note from day of discharge. Hospital Course:   79 yo female with hx HTN, SVT, pancreatic cancer, hypothyroidism. She presented to hospital with epigastric/RUQ abdominal pain associated with n/v.      Procedure(s) 10/18/2020 by Dr Ramírez Guard:  ERCP STENT REMOVAL/EXCHANGE  ERCP BIOPSY     Procedure(s) 10/21/2020 by Dr Ramírez Guard:  ERCP ENDOSCOPIC RETROGRADE CHOLANGIOPANCREATOGRAPHY  EGD BILIARY STENT REMOVAL  EGD DUODENAL STENT PLACEMENT 22MMX 80 MM WALLFLEX  EGD DUODENAL DILATION WITH 20 MM  BALLOON     Biliary stent removed and duodenal stent placement - only full liquid diet so stent does not get obstructed.  Bacteremia and sepsis POA based on klebsiella and streptococcus in blood - likely related to biliary stent obstruction and sepsis.  On ceftriaxone and flagyl.  Has chronic diastolic CHF with volume overload on IV lasix.  AFib with RVR likely due ot sepsis, now improved with controlled rate.   Has dilated ascending aorta - could consider referral to vascular, however she is a poor surgical candidate due to pancreatic cancer, age and overall poor prognosis. Michael Alexandra is an appropriate candidate for hospice.   Not anticoagulation candidate due to risk in setting of pancreatic cancer and age. 1.  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  Bacteremia with klebsiella and streptococcus, sepsis POA - on IV abx - sepsis POA based on leukocytosis, documented bacteremia, transaminitis, hypotension, fever, tachypnea  - transitioned to [facility] per ID mgt      3.  Chronic diastolic CHF - compensated at present - on lasix IV daily     4.  Afib - now rate controlled - not anticoagulation candidate     5.  Dilated ascending aorta - would not recommend referral to vascular as outpatient as she is a poor surgical candidate due to age and pancreatic cancer - not much to do unfortunately     Palliative care discussion with pts family, the choose to DC to SNF for rehab and then reconsider Palliative care hospice at later time   Physical Exam Performed:     /63   Pulse 67   Temp 95.8 °F (35.4 °C) (Oral)   Resp 16   Ht 5' 6\" (1.676 m)   Wt 168 lb 6.4 oz (76.4 kg)   SpO2 92%   BMI 27.18 kg/m²       General appearance:  No apparent distress, appears stated age and cooperative. HEENT:  Normal cephalic, atraumatic without obvious deformity. Pupils equal, round, and reactive to light. Extra ocular muscles intact. Conjunctivae/corneas clear. Neck: Supple, with full range of motion. No jugular venous distention. Trachea midline. Respiratory:  Normal respiratory effort. Clear to auscultation, bilaterally without Rales/Wheezes/Rhonchi. Cardiovascular:  Regular rate and rhythm with normal S1/S2 without murmurs, rubs or gallops. Abdomen: Soft, non-tender, non-distended with normal bowel sounds. Musculoskeletal:  No clubbing, cyanosis or edema bilaterally. Full range of motion without deformity.   Skin: Skin color, texture, turgor normal.  No rashes or lesions. Neurologic:  Neurovascularly intact without any focal sensory/motor deficits. Cranial nerves: II-XII intact, grossly non-focal.  Psychiatric:  Alert and oriented, thought content appropriate, normal insight  Capillary Refill: Brisk,< 3 seconds   Peripheral Pulses: +2 palpable, equal bilaterally       Labs: For convenience and continuity at follow-up the following most recent labs are provided:      CBC:    Lab Results   Component Value Date    WBC 9.0 10/27/2020    HGB 10.8 10/27/2020    HCT 32.4 10/27/2020     10/27/2020       Renal:    Lab Results   Component Value Date     10/27/2020    K 3.9 10/27/2020    K 3.7 10/16/2020    CL 97 10/27/2020    CO2 29 10/27/2020    BUN 10 10/27/2020    CREATININE 0.5 10/27/2020    CALCIUM 8.2 10/27/2020    PHOS 2.4 10/17/2020         Significant Diagnostic Studies    Radiology:   FL ERCP BILIARY AND PANCREATIC S&I   Final Result   Placement of expandable wire stent in the common duct. Please refer to the   procedure report for further details. XR CHEST (2 VW)   Final Result   Small right-sided pleural effusion and right basilar airspace disease. Findings slightly increased compared to prior study. Mild increased interstitial opacities which may be related bronchovascular   crowding from low lung volumes versus developing edema. CT ABDOMEN PELVIS W IV CONTRAST Additional Contrast? Oral   Final Result   Interval common bile duct stent exchange, with the new stent folded in the   common bile duct and the distal portion terminating within the 2nd portion of   the duodenum. Extensive pneumobilia with ongoing intra and extrahepatic bile duct   dilatation. Heterogeneous hepatic parenchymal enhancement may relate to cholangitis, as   described on operative report. No peter hepatic abscess.       Features of volume overload, with small volume abdominal ascites, bilateral   pleural effusions and 60 tablet, Refills: 3      dilTIAZem (CARDIZEM) 60 MG tablet Take 1 tablet by mouth every 8 hours  Qty: 120 tablet, Refills: 3      furosemide (LASIX) 40 MG tablet Take 1 tablet by mouth daily  Qty: 60 tablet, Refills: 3      polyethylene glycol (GLYCOLAX) 17 g packet Take 17 g by mouth daily as needed for Constipation  Qty: 527 g, Refills: 1      amoxicillin-clavulanate (AUGMENTIN) 500-125 MG per tablet Take 1 tablet by mouth every 12 hours for 10 days  Qty: 20 tablet, Refills: 0              Details   levothyroxine (SYNTHROID) 100 MCG tablet Take 1 tablet by mouth Daily  Qty: 30 tablet, Refills: 3              Details   esomeprazole Magnesium (NEXIUM) 20 MG PACK Take 20 mg by mouth daily      triamcinolone (KENALOG) 0.1 % cream Apply topically both ear canals with q tip every night. Qty: 15 g, Refills: 1    Associated Diagnoses: Chronic eczematous otitis externa of both ears             Time Spent on discharge is more than 30 minutes in the examination, evaluation, counseling and review of medications and discharge plan. Signed:    AME Goyal - CNP   10/27/2020      Thank you Octavio Cesar MD for the opportunity to be involved in this patient's care. If you have any questions or concerns please feel free to contact me at [phone].

## 2020-10-27 NOTE — PROGRESS NOTES
Comprehensive Nutrition Assessment    Type and Reason for Visit:  Reassess    Nutrition Recommendations/Plan:   1. Continue full liquid diet per GI with additional diet allowances per GI discretion   2. Continue Ensure Enlive ONS TID   3. Monitor POC, nutrition adequacy, pertinent labs, bowel habits, wt changes, and clinical progress    Nutrition Assessment:  Follow up: Diet advanced to full liquids per GI. Pt is tolerating diet well without any N/V or abd pain. Per GI, pt to continue full liquids and soft foods in order to keep stent clear. Palliative care discussions pending. She remains a full code with plans to d/c to ECF. ONS added to help meet nutrition needs. Overall PO intakes have been % of meals and supplements. Continue to monitor POC, PO adequacy, ongoing tolerance, wt changes, and need for nutrition support. Malnutrition Assessment:  Malnutrition Status:  No malnutrition      Estimated Daily Nutrient Needs:  Energy (kcal):  8166-5035 (20-25kcal/73kg); Weight Used for Energy Requirements:  Current     Protein (g):  71-83 (1.2-1.4g/59kg); Weight Used for Protein Requirements:  Ideal        Fluid (ml/day):  1mL/kcal; Weight Used for Fluid Requirements:  Current      Nutrition Related Findings:  +2 BLE edema. Last BM on 10/23. Wts trending up this admission, while -3L      Wounds:  Skin Tears       Current Nutrition Therapies:    DIET FULL LIQUID;  Dietary Nutrition Supplements: Low Calorie High Protein Supplement    Anthropometric Measures:  · Height: 5' 6\" (167.6 cm)  · Current Body Weight: 168 lb (76.2 kg)   · Admission Body Weight: 154 lb (69.9 kg)     · Ideal Body Weight: 130 lbs; % Ideal Body Weight 123.1 %   · BMI: 27.1  · Adjusted Body Weight:  ; No Adjustment   · BMI Categories: Overweight (BMI 25.0-29. 9)       Nutrition Diagnosis:   · Predicted inadequate energy intake related to catabolic illness as evidenced by NPO or clear liquid status due to medical condition(mostly NPO/CL since admission)      Nutrition Interventions:   Food and/or Nutrient Delivery:  Continue Current Diet, Start Oral Nutrition Supplement  Nutrition Education/Counseling:  Education initiated, No recommendation at this time   Coordination of Nutrition Care:  No recommendation at this time    Goals:  Meal and supplement intake greater than 50% vs nutrition support       Nutrition Monitoring and Evaluation:   Food/Nutrient Intake Outcomes:  Diet Advancement/Tolerance, Food and Nutrient Intake, Supplement Intake, Enteral Nutrition Intake/Tolerance  Physical Signs/Symptoms Outcomes:  Weight     Discharge Planning:    Continue current diet, Continue Oral Nutrition Supplement     Electronically signed by Azucena Hutchinson.  Sandra Angelo RD, LD on 10/27/20 at 8:54 AM EDT    Contact: [phone]

## 2020-10-27 NOTE — PLAN OF CARE
Nutrition Problem #1: Predicted inadequate energy intake  Intervention: Food and/or Nutrient Delivery: Continue Current Diet, Start Oral Nutrition Supplement  Nutritional Goals: Meal and supplement intake greater than 50% vs nutrition support

## 2020-10-27 NOTE — CARE COORDINATION
JOSE talked to Shahana Mcneil from [facility], stated can accept patient on d/c and will accept  Negative Rapid Covid-19 test that is within 72 hrs. JOSE informed the son Paola Olsen, he stated that he wants to check the visiting policy with [facility] before he give his final decision on where the patient should go . JOSE called the [facility] regarding the referral , left a VM and call back number.

## 2020-10-27 NOTE — CARE COORDINATION
Discharge Plan:     Patient discharged to:  · Name: [facility]  · [phone]  · Fax: [phone]  SW/DC Planner faxed, [phone] and [employer]  Narcotic Prescriptions faxed were: none  RN: Talisha Martinez will call report  Medical Transport with: [facility]  [phone]   time: 5pm  Family advised of discharge?: yes  HENS Submitted?: yes    All discharge needs met per case management.   Everton Gonzalez MSW, [address]

## 2020-11-21 ENCOUNTER — APPOINTMENT (OUTPATIENT)
Dept: GENERAL RADIOLOGY | Age: 85
DRG: 871 | End: 2020-11-21
Payer: MEDICARE

## 2020-11-21 ENCOUNTER — APPOINTMENT (OUTPATIENT)
Dept: CT IMAGING | Age: 85
DRG: 871 | End: 2020-11-21
Payer: MEDICARE

## 2020-11-21 ENCOUNTER — HOSPITAL ENCOUNTER (INPATIENT)
Age: 85
LOS: 3 days | Discharge: HOSPICE | DRG: 871 | End: 2020-11-24
Attending: EMERGENCY MEDICINE | Admitting: INTERNAL MEDICINE
Payer: MEDICARE

## 2020-11-21 LAB
ALBUMIN SERPL-MCNC: 2 G/DL (ref 3.4–5)
ALP BLD-CCNC: 993 U/L (ref 40–129)
ALT SERPL-CCNC: 106 U/L (ref 10–40)
AMMONIA: 32 UMOL/L (ref 11–51)
ANION GAP SERPL CALCULATED.3IONS-SCNC: 8 MMOL/L (ref 3–16)
ANISOCYTOSIS: ABNORMAL
APTT: 33.7 SEC (ref 24.2–36.2)
AST SERPL-CCNC: 98 U/L (ref 15–37)
BASE EXCESS VENOUS: 3.5 MMOL/L (ref -3–3)
BASOPHILS ABSOLUTE: 0 K/UL (ref 0–0.2)
BASOPHILS RELATIVE PERCENT: 0 %
BILIRUB SERPL-MCNC: 19 MG/DL (ref 0–1)
BILIRUBIN DIRECT: >10 MG/DL (ref 0–0.3)
BILIRUBIN, INDIRECT: ABNORMAL MG/DL (ref 0–1)
BUN BLDV-MCNC: 20 MG/DL (ref 7–20)
CALCIUM SERPL-MCNC: 7.7 MG/DL (ref 8.3–10.6)
CARBOXYHEMOGLOBIN: 4.1 % (ref 0–1.5)
CHLORIDE BLD-SCNC: 94 MMOL/L (ref 99–110)
CO2: 25 MMOL/L (ref 21–32)
CREAT SERPL-MCNC: <0.5 MG/DL (ref 0.6–1.2)
EOSINOPHILS ABSOLUTE: 0 K/UL (ref 0–0.6)
EOSINOPHILS RELATIVE PERCENT: 0 %
GFR AFRICAN AMERICAN: >60
GFR NON-AFRICAN AMERICAN: >60
GLUCOSE BLD-MCNC: 109 MG/DL (ref 70–99)
HCO3 VENOUS: 26.1 MMOL/L (ref 23–29)
HCT VFR BLD CALC: 33 % (ref 36–48)
HEMOGLOBIN: 11.4 G/DL (ref 12–16)
INR BLD: 1.52 (ref 0.86–1.14)
LACTIC ACID: 1.2 MMOL/L (ref 0.4–2)
LIPASE: 109 U/L (ref 13–60)
LYMPHOCYTES ABSOLUTE: 0.5 K/UL (ref 1–5.1)
LYMPHOCYTES RELATIVE PERCENT: 4 %
MCH RBC QN AUTO: 30.7 PG (ref 26–34)
MCHC RBC AUTO-ENTMCNC: 34.5 G/DL (ref 31–36)
MCV RBC AUTO: 88.8 FL (ref 80–100)
METHEMOGLOBIN VENOUS: 0 %
MONOCYTES ABSOLUTE: 0.3 K/UL (ref 0–1.3)
MONOCYTES RELATIVE PERCENT: 2 %
NEUTROPHILS ABSOLUTE: 12.7 K/UL (ref 1.7–7.7)
NEUTROPHILS RELATIVE PERCENT: 94 %
O2 CONTENT, VEN: 13 VOL %
O2 SAT, VEN: 100 %
O2 THERAPY: ABNORMAL
PCO2, VEN: 31.2 MMHG (ref 40–50)
PDW BLD-RTO: 17.2 % (ref 12.4–15.4)
PH VENOUS: 7.53 (ref 7.35–7.45)
PLATELET # BLD: 250 K/UL (ref 135–450)
PLATELET SLIDE REVIEW: ADEQUATE
PMV BLD AUTO: 8.6 FL (ref 5–10.5)
PO2, VEN: 154 MMHG (ref 25–40)
POLYCHROMASIA: ABNORMAL
POTASSIUM SERPL-SCNC: 4.6 MMOL/L (ref 3.5–5.1)
PROCALCITONIN: 0.39 NG/ML (ref 0–0.15)
PROTHROMBIN TIME: 17.7 SEC (ref 10–13.2)
RBC # BLD: 3.71 M/UL (ref 4–5.2)
SLIDE REVIEW: ABNORMAL
SODIUM BLD-SCNC: 127 MMOL/L (ref 136–145)
TARGET CELLS: ABNORMAL
TCO2 CALC VENOUS: 61 MMOL/L
TOTAL PROTEIN: 5.3 G/DL (ref 6.4–8.2)
TROPONIN: 0.02 NG/ML
WBC # BLD: 13.5 K/UL (ref 4–11)

## 2020-11-21 PROCEDURE — 83690 ASSAY OF LIPASE: CPT

## 2020-11-21 PROCEDURE — 80048 BASIC METABOLIC PNL TOTAL CA: CPT

## 2020-11-21 PROCEDURE — 71045 X-RAY EXAM CHEST 1 VIEW: CPT

## 2020-11-21 PROCEDURE — 85025 COMPLETE CBC W/AUTO DIFF WBC: CPT

## 2020-11-21 PROCEDURE — 96375 TX/PRO/DX INJ NEW DRUG ADDON: CPT

## 2020-11-21 PROCEDURE — 87040 BLOOD CULTURE FOR BACTERIA: CPT

## 2020-11-21 PROCEDURE — 96365 THER/PROPH/DIAG IV INF INIT: CPT

## 2020-11-21 PROCEDURE — 85730 THROMBOPLASTIN TIME PARTIAL: CPT

## 2020-11-21 PROCEDURE — 83605 ASSAY OF LACTIC ACID: CPT

## 2020-11-21 PROCEDURE — 82803 BLOOD GASES ANY COMBINATION: CPT

## 2020-11-21 PROCEDURE — 74176 CT ABD & PELVIS W/O CONTRAST: CPT

## 2020-11-21 PROCEDURE — 99284 EMERGENCY DEPT VISIT MOD MDM: CPT

## 2020-11-21 PROCEDURE — 93005 ELECTROCARDIOGRAM TRACING: CPT | Performed by: EMERGENCY MEDICINE

## 2020-11-21 PROCEDURE — U0003 INFECTIOUS AGENT DETECTION BY NUCLEIC ACID (DNA OR RNA); SEVERE ACUTE RESPIRATORY SYNDROME CORONAVIRUS 2 (SARS-COV-2) (CORONAVIRUS DISEASE [COVID-19]), AMPLIFIED PROBE TECHNIQUE, MAKING USE OF HIGH THROUGHPUT TECHNOLOGIES AS DESCRIBED BY CMS-2020-01-R: HCPCS

## 2020-11-21 PROCEDURE — 85610 PROTHROMBIN TIME: CPT

## 2020-11-21 PROCEDURE — 2580000003 HC RX 258: Performed by: PHYSICIAN ASSISTANT

## 2020-11-21 PROCEDURE — 84145 PROCALCITONIN (PCT): CPT

## 2020-11-21 PROCEDURE — 82140 ASSAY OF AMMONIA: CPT

## 2020-11-21 PROCEDURE — 80076 HEPATIC FUNCTION PANEL: CPT

## 2020-11-21 PROCEDURE — 1200000000 HC SEMI PRIVATE

## 2020-11-21 PROCEDURE — 6360000002 HC RX W HCPCS: Performed by: EMERGENCY MEDICINE

## 2020-11-21 PROCEDURE — 84484 ASSAY OF TROPONIN QUANT: CPT

## 2020-11-21 PROCEDURE — 2580000003 HC RX 258: Performed by: EMERGENCY MEDICINE

## 2020-11-21 RX ORDER — ONDANSETRON 4 MG/1
4 TABLET, FILM COATED ORAL EVERY 8 HOURS PRN
COMMUNITY

## 2020-11-21 RX ORDER — ESCITALOPRAM OXALATE 20 MG/1
TABLET ORAL
COMMUNITY

## 2020-11-21 RX ORDER — MORPHINE SULFATE 4 MG/ML
4 INJECTION, SOLUTION INTRAMUSCULAR; INTRAVENOUS ONCE
Status: COMPLETED | OUTPATIENT
Start: 2020-11-21 | End: 2020-11-21

## 2020-11-21 RX ORDER — HYDROXYZINE HYDROCHLORIDE 25 MG/1
25 TABLET, FILM COATED ORAL 3 TIMES DAILY PRN
COMMUNITY

## 2020-11-21 RX ORDER — SODIUM CHLORIDE, SODIUM LACTATE, POTASSIUM CHLORIDE, CALCIUM CHLORIDE 600; 310; 30; 20 MG/100ML; MG/100ML; MG/100ML; MG/100ML
30 INJECTION, SOLUTION INTRAVENOUS ONCE
Status: COMPLETED | OUTPATIENT
Start: 2020-11-21 | End: 2020-11-21

## 2020-11-21 RX ORDER — 0.9 % SODIUM CHLORIDE 0.9 %
1000 INTRAVENOUS SOLUTION INTRAVENOUS ONCE
Status: COMPLETED | OUTPATIENT
Start: 2020-11-21 | End: 2020-11-21

## 2020-11-21 RX ORDER — SODIUM CHLORIDE, SODIUM LACTATE, POTASSIUM CHLORIDE, CALCIUM CHLORIDE 600; 310; 30; 20 MG/100ML; MG/100ML; MG/100ML; MG/100ML
1000 INJECTION, SOLUTION INTRAVENOUS ONCE
Status: COMPLETED | OUTPATIENT
Start: 2020-11-21 | End: 2020-11-22

## 2020-11-21 RX ADMIN — SODIUM CHLORIDE, POTASSIUM CHLORIDE, SODIUM LACTATE AND CALCIUM CHLORIDE 1974 ML: 600; 310; 30; 20 INJECTION, SOLUTION INTRAVENOUS at 23:27

## 2020-11-21 RX ADMIN — PIPERACILLIN AND TAZOBACTAM 3.38 G: 3; .375 INJECTION, POWDER, LYOPHILIZED, FOR SOLUTION INTRAVENOUS at 21:58

## 2020-11-21 RX ADMIN — MORPHINE SULFATE 4 MG: 4 INJECTION INTRAVENOUS at 23:47

## 2020-11-21 RX ADMIN — SODIUM CHLORIDE, POTASSIUM CHLORIDE, SODIUM LACTATE AND CALCIUM CHLORIDE 1000 ML: 600; 310; 30; 20 INJECTION, SOLUTION INTRAVENOUS at 23:47

## 2020-11-21 RX ADMIN — SODIUM CHLORIDE 1000 ML: 9 INJECTION, SOLUTION INTRAVENOUS at 21:58

## 2020-11-21 ASSESSMENT — PAIN SCALES - GENERAL: PAINLEVEL_OUTOF10: 1

## 2020-11-22 LAB
A/G RATIO: 0.4 (ref 1.1–2.2)
ALBUMIN SERPL-MCNC: 1.5 G/DL (ref 3.4–5)
ALP BLD-CCNC: 908 U/L (ref 40–129)
ALT SERPL-CCNC: 98 U/L (ref 10–40)
ANION GAP SERPL CALCULATED.3IONS-SCNC: 7 MMOL/L (ref 3–16)
ANISOCYTOSIS: ABNORMAL
AST SERPL-CCNC: 84 U/L (ref 15–37)
BANDED NEUTROPHILS RELATIVE PERCENT: 1 % (ref 0–7)
BASOPHILS ABSOLUTE: 0 K/UL (ref 0–0.2)
BASOPHILS RELATIVE PERCENT: 0 %
BILIRUB SERPL-MCNC: 17.5 MG/DL (ref 0–1)
BUN BLDV-MCNC: 18 MG/DL (ref 7–20)
CALCIUM IONIZED: 1.05 MMOL/L (ref 1.12–1.32)
CALCIUM SERPL-MCNC: 7.7 MG/DL (ref 8.3–10.6)
CHLORIDE BLD-SCNC: 99 MMOL/L (ref 99–110)
CO2: 25 MMOL/L (ref 21–32)
CREAT SERPL-MCNC: <0.5 MG/DL (ref 0.6–1.2)
EKG ATRIAL RATE: 119 BPM
EKG DIAGNOSIS: NORMAL
EKG Q-T INTERVAL: 354 MS
EKG QRS DURATION: 76 MS
EKG QTC CALCULATION (BAZETT): 435 MS
EKG R AXIS: 56 DEGREES
EKG T AXIS: 38 DEGREES
EKG VENTRICULAR RATE: 91 BPM
EOSINOPHILS ABSOLUTE: 0 K/UL (ref 0–0.6)
EOSINOPHILS RELATIVE PERCENT: 0 %
GFR AFRICAN AMERICAN: >60
GFR NON-AFRICAN AMERICAN: >60
GLOBULIN: 3.5 G/DL
GLUCOSE BLD-MCNC: 124 MG/DL (ref 70–99)
HCT VFR BLD CALC: 29.2 % (ref 36–48)
HEMOGLOBIN: 9.7 G/DL (ref 12–16)
LYMPHOCYTES ABSOLUTE: 1.4 K/UL (ref 1–5.1)
LYMPHOCYTES RELATIVE PERCENT: 12 %
MAGNESIUM: 1.7 MG/DL (ref 1.8–2.4)
MCH RBC QN AUTO: 29.9 PG (ref 26–34)
MCHC RBC AUTO-ENTMCNC: 33.4 G/DL (ref 31–36)
MCV RBC AUTO: 89.7 FL (ref 80–100)
MONOCYTES ABSOLUTE: 0.6 K/UL (ref 0–1.3)
MONOCYTES RELATIVE PERCENT: 5 %
MYELOCYTE PERCENT: 1 %
NEUTROPHILS ABSOLUTE: 9.5 K/UL (ref 1.7–7.7)
NEUTROPHILS RELATIVE PERCENT: 81 %
PDW BLD-RTO: 17.8 % (ref 12.4–15.4)
PH VENOUS: 7.41 (ref 7.35–7.45)
PHOSPHORUS: 3.4 MG/DL (ref 2.5–4.9)
PLATELET # BLD: 246 K/UL (ref 135–450)
PLATELET SLIDE REVIEW: ADEQUATE
PMV BLD AUTO: 8.9 FL (ref 5–10.5)
POLYCHROMASIA: ABNORMAL
POTASSIUM SERPL-SCNC: 4.3 MMOL/L (ref 3.5–5.1)
RBC # BLD: 3.26 M/UL (ref 4–5.2)
SLIDE REVIEW: ABNORMAL
SODIUM BLD-SCNC: 131 MMOL/L (ref 136–145)
TARGET CELLS: ABNORMAL
TOTAL PROTEIN: 5 G/DL (ref 6.4–8.2)
WBC # BLD: 11.4 K/UL (ref 4–11)

## 2020-11-22 PROCEDURE — 36415 COLL VENOUS BLD VENIPUNCTURE: CPT

## 2020-11-22 PROCEDURE — 84100 ASSAY OF PHOSPHORUS: CPT

## 2020-11-22 PROCEDURE — 85025 COMPLETE CBC W/AUTO DIFF WBC: CPT

## 2020-11-22 PROCEDURE — 80053 COMPREHEN METABOLIC PANEL: CPT

## 2020-11-22 PROCEDURE — 93010 ELECTROCARDIOGRAM REPORT: CPT | Performed by: INTERNAL MEDICINE

## 2020-11-22 PROCEDURE — 6360000002 HC RX W HCPCS: Performed by: INTERNAL MEDICINE

## 2020-11-22 PROCEDURE — 83735 ASSAY OF MAGNESIUM: CPT

## 2020-11-22 PROCEDURE — 51702 INSERT TEMP BLADDER CATH: CPT

## 2020-11-22 PROCEDURE — 2580000003 HC RX 258: Performed by: INTERNAL MEDICINE

## 2020-11-22 PROCEDURE — 94760 N-INVAS EAR/PLS OXIMETRY 1: CPT

## 2020-11-22 PROCEDURE — 6370000000 HC RX 637 (ALT 250 FOR IP): Performed by: INTERNAL MEDICINE

## 2020-11-22 PROCEDURE — 1200000000 HC SEMI PRIVATE

## 2020-11-22 PROCEDURE — 82330 ASSAY OF CALCIUM: CPT

## 2020-11-22 RX ORDER — SODIUM CHLORIDE 0.9 % (FLUSH) 0.9 %
10 SYRINGE (ML) INJECTION EVERY 12 HOURS SCHEDULED
Status: DISCONTINUED | OUTPATIENT
Start: 2020-11-22 | End: 2020-11-24 | Stop reason: HOSPADM

## 2020-11-22 RX ORDER — SODIUM CHLORIDE 9 MG/ML
INJECTION, SOLUTION INTRAVENOUS CONTINUOUS
Status: ACTIVE | OUTPATIENT
Start: 2020-11-22 | End: 2020-11-24

## 2020-11-22 RX ORDER — ONDANSETRON 2 MG/ML
4 INJECTION INTRAMUSCULAR; INTRAVENOUS EVERY 6 HOURS PRN
Status: DISCONTINUED | OUTPATIENT
Start: 2020-11-22 | End: 2020-11-24 | Stop reason: HOSPADM

## 2020-11-22 RX ORDER — MAGNESIUM SULFATE 1 G/100ML
1 INJECTION INTRAVENOUS ONCE
Status: COMPLETED | OUTPATIENT
Start: 2020-11-22 | End: 2020-11-22

## 2020-11-22 RX ORDER — MORPHINE SULFATE 2 MG/ML
2 INJECTION, SOLUTION INTRAMUSCULAR; INTRAVENOUS EVERY 4 HOURS PRN
Status: COMPLETED | OUTPATIENT
Start: 2020-11-22 | End: 2020-11-24

## 2020-11-22 RX ORDER — SODIUM CHLORIDE 0.9 % (FLUSH) 0.9 %
10 SYRINGE (ML) INJECTION PRN
Status: DISCONTINUED | OUTPATIENT
Start: 2020-11-22 | End: 2020-11-24 | Stop reason: HOSPADM

## 2020-11-22 RX ORDER — POLYETHYLENE GLYCOL 3350 17 G/17G
17 POWDER, FOR SOLUTION ORAL DAILY PRN
Status: DISCONTINUED | OUTPATIENT
Start: 2020-11-22 | End: 2020-11-24 | Stop reason: HOSPADM

## 2020-11-22 RX ORDER — ACETAMINOPHEN 650 MG/1
650 SUPPOSITORY RECTAL EVERY 6 HOURS PRN
Status: DISCONTINUED | OUTPATIENT
Start: 2020-11-22 | End: 2020-11-24 | Stop reason: HOSPADM

## 2020-11-22 RX ORDER — LEVOTHYROXINE SODIUM 0.1 MG/1
100 TABLET ORAL DAILY
Status: DISCONTINUED | OUTPATIENT
Start: 2020-11-22 | End: 2020-11-24 | Stop reason: HOSPADM

## 2020-11-22 RX ORDER — PROMETHAZINE HYDROCHLORIDE 25 MG/1
12.5 TABLET ORAL EVERY 6 HOURS PRN
Status: DISCONTINUED | OUTPATIENT
Start: 2020-11-22 | End: 2020-11-24 | Stop reason: HOSPADM

## 2020-11-22 RX ORDER — HYDROXYZINE HYDROCHLORIDE 25 MG/1
25 TABLET, FILM COATED ORAL 3 TIMES DAILY PRN
Status: DISCONTINUED | OUTPATIENT
Start: 2020-11-22 | End: 2020-11-24 | Stop reason: HOSPADM

## 2020-11-22 RX ORDER — OXYCODONE HYDROCHLORIDE 5 MG/1
5 TABLET ORAL EVERY 6 HOURS PRN
Status: DISCONTINUED | OUTPATIENT
Start: 2020-11-22 | End: 2020-11-24 | Stop reason: HOSPADM

## 2020-11-22 RX ORDER — ACETAMINOPHEN 325 MG/1
650 TABLET ORAL EVERY 6 HOURS PRN
Status: DISCONTINUED | OUTPATIENT
Start: 2020-11-22 | End: 2020-11-24 | Stop reason: HOSPADM

## 2020-11-22 RX ADMIN — ENOXAPARIN SODIUM 40 MG: 40 INJECTION SUBCUTANEOUS at 10:46

## 2020-11-22 RX ADMIN — MAGNESIUM SULFATE HEPTAHYDRATE 1 G: 1 INJECTION, SOLUTION INTRAVENOUS at 10:45

## 2020-11-22 RX ADMIN — LEVOTHYROXINE SODIUM 100 MCG: 0.1 TABLET ORAL at 10:46

## 2020-11-22 RX ADMIN — HYDROXYZINE HYDROCHLORIDE 25 MG: 25 TABLET, FILM COATED ORAL at 20:55

## 2020-11-22 RX ADMIN — OXYCODONE HYDROCHLORIDE 5 MG: 5 TABLET ORAL at 10:46

## 2020-11-22 RX ADMIN — Medication 10 ML: at 13:20

## 2020-11-22 RX ADMIN — PIPERACILLIN AND TAZOBACTAM 3.38 G: 3; .375 INJECTION, POWDER, LYOPHILIZED, FOR SOLUTION INTRAVENOUS at 06:42

## 2020-11-22 RX ADMIN — HYDROXYZINE HYDROCHLORIDE 25 MG: 25 TABLET, FILM COATED ORAL at 02:45

## 2020-11-22 RX ADMIN — Medication 10 ML: at 20:55

## 2020-11-22 RX ADMIN — PIPERACILLIN AND TAZOBACTAM 3.38 G: 3; .375 INJECTION, POWDER, LYOPHILIZED, FOR SOLUTION INTRAVENOUS at 21:00

## 2020-11-22 RX ADMIN — SODIUM CHLORIDE: 9 INJECTION, SOLUTION INTRAVENOUS at 02:45

## 2020-11-22 RX ADMIN — MORPHINE SULFATE 2 MG: 2 INJECTION, SOLUTION INTRAMUSCULAR; INTRAVENOUS at 02:45

## 2020-11-22 RX ADMIN — HYDROXYZINE HYDROCHLORIDE 25 MG: 25 TABLET, FILM COATED ORAL at 10:46

## 2020-11-22 RX ADMIN — PIPERACILLIN AND TAZOBACTAM 3.38 G: 3; .375 INJECTION, POWDER, LYOPHILIZED, FOR SOLUTION INTRAVENOUS at 14:00

## 2020-11-22 ASSESSMENT — PAIN SCALES - GENERAL
PAINLEVEL_OUTOF10: 4
PAINLEVEL_OUTOF10: 6
PAINLEVEL_OUTOF10: 0
PAINLEVEL_OUTOF10: 3
PAINLEVEL_OUTOF10: 0
PAINLEVEL_OUTOF10: 7
PAINLEVEL_OUTOF10: 4
PAINLEVEL_OUTOF10: 0

## 2020-11-22 ASSESSMENT — ENCOUNTER SYMPTOMS
ABDOMINAL PAIN: 1
SHORTNESS OF BREATH: 0
COUGH: 0
RHINORRHEA: 0
NAUSEA: 0
VOMITING: 0
DIARRHEA: 0

## 2020-11-22 NOTE — PROGRESS NOTES
Assessment completed . Patient is awake and alert, very pleasant affect. Incontinent of a large amount of dark kayla urine . Complete linen change and bath given with repositioning. purewick put in place. Iv siet patient. Assisted patient to insert hearing aids. Plan of care discussed.

## 2020-11-22 NOTE — ED NOTES
Pts hearing aids placed in a denture cup and in a bag with belongings     Elliott Malin RN  11/21/20 6518

## 2020-11-22 NOTE — ED NOTES
Bed: 07  Expected date:   Expected time:   Means of arrival:   Comments:  Hold for leny Soares RN  11/21/20 2101
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
## 2020-11-22 NOTE — PROGRESS NOTES
[facility] PROGRESS NOTE    11/22/2020 8:12 AM        Name: Kaitlin Nicole . Admitted: 11/21/2020  Primary Care Provider: Lieutenant Ramu MD (Tel: [phone])    Brief Course:  80 y.o. female with history of pancreatic cancer who was recently hospitalized for bacteremia, history of atrial fibrillation, CHF, essential hypertension, who was previously with hospice (hospice was revoked and CODE STATUS currently is DNR-CCA/DNI), who was sent to the emergency room for concern for possible sepsis. Patient reports \"I was sick to my stomach\" as the only reason she came to ER, stating \"and it's fine now. \" She does not have fever or chills. She states she has had low blood pressure. Abnormal labs in the emergency room include sodium of 127, lipase 109, troponin 0 0.02, procalcitonin of 0.39, , AST 98, total bilirubin greater than 19. There is concern for new hepatic metastasis as well as pancreatitis. She also has a small right pleural effusion on chest imaging. Patient received a dose of Zosyn in the emergency room. She has been admitted with GI and palliative care consults. CC: pain abdomen, possible sepsis  Subjective: Patient appears jaundiced. Reports that pain in her abdomen is currently resolved. Reports that she would like to have something to eat.     Reviewed interval ancillary notes    Current Medications  hydrOXYzine (ATARAX) tablet 25 mg, TID PRN  levothyroxine (SYNTHROID) tablet 100 mcg, Daily  piperacillin-tazobactam (ZOSYN) 3.375 g in dextrose 5 % 50 mL IVPB extended infusion (mini-bag), Q8H  oxyCODONE (ROXICODONE) immediate release tablet 5 mg, Q6H PRN  morphine (PF) injection 2 mg, Q4H PRN  0.9 % sodium chloride infusion, Continuous  sodium chloride flush 0.9 % injection 10 mL, 2 times per day  sodium chloride flush 0.9 % injection 10 mL, PRN  acetaminophen (TYLENOL) tablet 650 mg, Q6H PRN    Or  acetaminophen (TYLENOL) suppository 650 mg, Q6H PRN  polyethylene glycol (GLYCOLAX) packet 17 g, Daily PRN  promethazine (PHENERGAN) tablet 12.5 mg, Q6H PRN    Or  ondansetron (ZOFRAN) injection 4 mg, Q6H PRN  enoxaparin (LOVENOX) injection 40 mg, Daily  influenza quadrivalent split vaccine (FLUZONE;FLUARIX;FLULAVAL;AFLURIA) injection 0.5 mL, Prior to discharge  magnesium sulfate 1 g in dextrose 5% 100 mL IVPB, Once        Objective:  /80   Pulse 84   Temp 98.1 °F (36.7 °C) (Temporal)   Resp 12   Ht 5' 5\" (1.651 m)   Wt 154 lb 5.2 oz (70 kg)   SpO2 92%   BMI 25.68 kg/m²   No intake or output data in the 24 hours ending 11/22/20 0812   Wt Readings from Last 3 Encounters:   11/22/20 154 lb 5.2 oz (70 kg)   10/27/20 168 lb 6.4 oz (76.4 kg)   06/09/19 145 lb (65.8 kg)       General appearance: Elderly white female, jaundiced. ENT: Moist oral mucosa. Trachea midline, no adenopathy. Cardiovascular: Regular rhythm, normal S1, S2. No murmur. No edema in lower extremities  Respiratory: lungs clear, no wheeze or crackles. GI: Abdomen soft, no tenderness, not distended, normal bowel sounds  Musculoskeletal: No cyanosis in digits, neck supple  Psych: Normal affect. Alert and oriented to place  Skin: Warm, dry, normal turgor, +jaundice  Extremity exam shows brisk capillary refill. Peripheral pulses are palpable in lower extremities     Labs and Tests:  CBC:   Recent Labs     11/21/20 2200 11/22/20 0337   WBC 13.5* 11.4*   HGB 11.4* 9.7*    246     BMP:    Recent Labs     11/21/20 2200 11/22/20 0337   * 131*   K 4.6 4.3   CL 94* 99   CO2 25 25   BUN 20 18   CREATININE <0.5* <0.5*   GLUCOSE 109* 124*     Hepatic:   Recent Labs     11/21/20 2200 11/22/20  0337   AST 98* 84*   * 98*   BILITOT 19.0* 17.5*   ALKPHOS 993* 908*     CT ABDOMEN PELVIS WO CONTRAST Additional Contrast? None   Final Result   1.  Interval worsening since October 2020 of severe gallbladder distension and stent placement on 6/19/20. Stent exchange done on 10/18 by Dr. Charlie Myers. EGD with biliary stent removal and duodenal dilation on 10/21. Patient opted to go home with hospice. CODE STATUS changed to DNR-CCA/DNI. CHF: Stable. Essential hypertension: Labile BPs. Holding antihypertensives. ACP: new consult has been initiated to palliative care on admission.     IV Access: peripehral iv  Diet: Diet NPO Effective Now Exceptions are: Sips with Meds  Code:DNR-CCA  DVT PPX: lovenox sq d  Disposition: pending palliative care consult, placement      Lindy Garrett MD   11/22/2020 8:12 AM

## 2020-11-22 NOTE — ED NOTES
Pt incontinent of urine and stool. Brief changed, and new gown. Pt resting comfortably at this time.   Fluids infusing     Nohemy Norris RN  11/21/20 9110
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
## 2020-11-22 NOTE — PROGRESS NOTES
Son here brought in patient's cell phone. Patient spoke with her children on the phone. States lower back pain she had earlier is gone. repositioned again.

## 2020-11-23 LAB
A/G RATIO: 0.5 (ref 1.1–2.2)
ALBUMIN SERPL-MCNC: 1.6 G/DL (ref 3.4–5)
ALP BLD-CCNC: 892 U/L (ref 40–129)
ALT SERPL-CCNC: 93 U/L (ref 10–40)
ANION GAP SERPL CALCULATED.3IONS-SCNC: 10 MMOL/L (ref 3–16)
ANISOCYTOSIS: ABNORMAL
AST SERPL-CCNC: 92 U/L (ref 15–37)
BASOPHILS ABSOLUTE: 0 K/UL (ref 0–0.2)
BASOPHILS RELATIVE PERCENT: 0 %
BILIRUB SERPL-MCNC: 18 MG/DL (ref 0–1)
BUN BLDV-MCNC: 15 MG/DL (ref 7–20)
CALCIUM SERPL-MCNC: 7.8 MG/DL (ref 8.3–10.6)
CHLORIDE BLD-SCNC: 99 MMOL/L (ref 99–110)
CO2: 23 MMOL/L (ref 21–32)
CREAT SERPL-MCNC: <0.5 MG/DL (ref 0.6–1.2)
EOSINOPHILS ABSOLUTE: 0 K/UL (ref 0–0.6)
EOSINOPHILS RELATIVE PERCENT: 0 %
GFR AFRICAN AMERICAN: >60
GFR NON-AFRICAN AMERICAN: >60
GLOBULIN: 3.5 G/DL
GLUCOSE BLD-MCNC: 80 MG/DL (ref 70–99)
HCT VFR BLD CALC: 32.7 % (ref 36–48)
HEMOGLOBIN: 11.1 G/DL (ref 12–16)
LYMPHOCYTES ABSOLUTE: 1 K/UL (ref 1–5.1)
LYMPHOCYTES RELATIVE PERCENT: 11 %
MCH RBC QN AUTO: 30.3 PG (ref 26–34)
MCHC RBC AUTO-ENTMCNC: 33.8 G/DL (ref 31–36)
MCV RBC AUTO: 89.7 FL (ref 80–100)
MONOCYTES ABSOLUTE: 0.5 K/UL (ref 0–1.3)
MONOCYTES RELATIVE PERCENT: 5 %
NEUTROPHILS ABSOLUTE: 8 K/UL (ref 1.7–7.7)
NEUTROPHILS RELATIVE PERCENT: 84 %
PDW BLD-RTO: 17.9 % (ref 12.4–15.4)
PLATELET # BLD: 300 K/UL (ref 135–450)
PMV BLD AUTO: 8.9 FL (ref 5–10.5)
POLYCHROMASIA: ABNORMAL
POTASSIUM REFLEX MAGNESIUM: 3.8 MMOL/L (ref 3.5–5.1)
RBC # BLD: 3.65 M/UL (ref 4–5.2)
SARS-COV-2, PCR: NOT DETECTED
SODIUM BLD-SCNC: 132 MMOL/L (ref 136–145)
TARGET CELLS: ABNORMAL
TOTAL PROTEIN: 5.1 G/DL (ref 6.4–8.2)
WBC # BLD: 9.5 K/UL (ref 4–11)

## 2020-11-23 PROCEDURE — 2580000003 HC RX 258: Performed by: INTERNAL MEDICINE

## 2020-11-23 PROCEDURE — 6370000000 HC RX 637 (ALT 250 FOR IP): Performed by: INTERNAL MEDICINE

## 2020-11-23 PROCEDURE — 36415 COLL VENOUS BLD VENIPUNCTURE: CPT

## 2020-11-23 PROCEDURE — 80053 COMPREHEN METABOLIC PANEL: CPT

## 2020-11-23 PROCEDURE — 85025 COMPLETE CBC W/AUTO DIFF WBC: CPT

## 2020-11-23 PROCEDURE — 6360000002 HC RX W HCPCS: Performed by: INTERNAL MEDICINE

## 2020-11-23 PROCEDURE — 1200000000 HC SEMI PRIVATE

## 2020-11-23 RX ADMIN — ENOXAPARIN SODIUM 40 MG: 40 INJECTION SUBCUTANEOUS at 08:28

## 2020-11-23 RX ADMIN — PIPERACILLIN AND TAZOBACTAM 3.38 G: 3; .375 INJECTION, POWDER, LYOPHILIZED, FOR SOLUTION INTRAVENOUS at 20:25

## 2020-11-23 RX ADMIN — MORPHINE SULFATE 2 MG: 2 INJECTION, SOLUTION INTRAMUSCULAR; INTRAVENOUS at 20:31

## 2020-11-23 RX ADMIN — LEVOTHYROXINE SODIUM 100 MCG: 0.1 TABLET ORAL at 05:22

## 2020-11-23 RX ADMIN — PIPERACILLIN AND TAZOBACTAM 3.38 G: 3; .375 INJECTION, POWDER, LYOPHILIZED, FOR SOLUTION INTRAVENOUS at 14:53

## 2020-11-23 RX ADMIN — Medication 10 ML: at 09:26

## 2020-11-23 RX ADMIN — SODIUM CHLORIDE: 9 INJECTION, SOLUTION INTRAVENOUS at 23:00

## 2020-11-23 RX ADMIN — PIPERACILLIN AND TAZOBACTAM 3.38 G: 3; .375 INJECTION, POWDER, LYOPHILIZED, FOR SOLUTION INTRAVENOUS at 05:22

## 2020-11-23 ASSESSMENT — PAIN SCALES - GENERAL
PAINLEVEL_OUTOF10: 0
PAINLEVEL_OUTOF10: 5
PAINLEVEL_OUTOF10: 0
PAINLEVEL_OUTOF10: 0
PAINLEVEL_OUTOF10: 5
PAINLEVEL_OUTOF10: 0
PAINLEVEL_OUTOF10: 6

## 2020-11-23 NOTE — CONSULTS
Palliative Care:     Initial consult completed on 10/22/20. History of Presenting Illness: This is a pleasant 80 y.o. female with history of pancreatic cancer who was recently hospitalized for bacteremia, history of atrial fibrillation, CHF, essential hypertension, who was previously with hospice (hospice was revoked tonight and CODE STATUS currently is DNR-CCA/DNI), who was sent to the emergency room for concern for possible sepsis. Patient reports \"I was sick to my stomach\" as the only reason she came to ER, stating \"and it's fine now. \" She does not have fever or chills. She states she has had low blood pressure. Abnormal labs in the emergency room include sodium of 127, lipase 109, troponin 0 0.02, procalcitonin of 0.39, , AST 98, total bilirubin greater than 19. There is concern for new hepatic metastasis as well as pancreatitis. She also has a small right pleural effusion on chest imaging. Patient received a dose of Zosyn in the emergency room. She has been admitted for further evaluation. Admitted 11/21/20 for Acute Pancreatitis and Palliative Care consult ordered. Family Discussion:   Patient is A/O X3. Able to express her own wishes at this time. Current Code status DNR-CCA. Current WT @ 154 lbs. Down from 167 lbs on 10/22/20. (12 lbs). Current Albumin 1.5. Down from 2.2 on 10/22/20. Bilirubin currently at 18. Patient is . Has two children. [employer].     Last admit patient was presented with PPS of 40%. Has been living in her home with support of son Ana Benedict and his wife since last DC. Hospice of [address] [place] [address] was onboard and revocated. Ana Benedict (son) at bedside. Updated DPOA paperwork. Copy obtained and placed into file to be scanned into EMR. Notification of admission to [address] aware. Christofer Conrad (liasion) present today to complete the revocation process. Goal upon DC is to return to home setting with Hospice readmission for support/comfort.      Will continue to follow as needed for educational/emotional support. Emmanuel Wayne (son) has my contact information.

## 2020-11-23 NOTE — PROGRESS NOTES
[facility]    11/23/2020 8:42 AM        Name: Geovany Vance . Admitted: 11/21/2020  Primary Care Provider: Talisha Fernando MD (Tel: [phone])    Brief Course:  80 y.o. female with history of pancreatic cancer who was recently hospitalized for bacteremia, history of atrial fibrillation, CHF, essential hypertension, who was previously with hospice (hospice was revoked and CODE STATUS currently is DNR-CCA/DNI), who was sent to the emergency room for concern for possible sepsis. Abnormal labs in the emergency room include sodium of 127, lipase 109, troponin 0 0.02, procalcitonin of 0.39, , AST 98, total bilirubin greater than 19. There is concern for new hepatic metastasis as well as pancreatitis. Consulted GI service. GI recommends IR guided PTC placement which patient has been refusing. Palliative care consulted for goals of care. CC: pain abdomen  Subjective: No acute events overnight. Patient reports feeling hungry and requesting for oral diet. Offers no complaints.     Reviewed interval ancillary notes    Current Medications  hydrOXYzine (ATARAX) tablet 25 mg, TID PRN  levothyroxine (SYNTHROID) tablet 100 mcg, Daily  piperacillin-tazobactam (ZOSYN) 3.375 g in dextrose 5 % 50 mL IVPB extended infusion (mini-bag), Q8H  oxyCODONE (ROXICODONE) immediate release tablet 5 mg, Q6H PRN  morphine (PF) injection 2 mg, Q4H PRN  0.9 % sodium chloride infusion, Continuous  sodium chloride flush 0.9 % injection 10 mL, 2 times per day  sodium chloride flush 0.9 % injection 10 mL, PRN  acetaminophen (TYLENOL) tablet 650 mg, Q6H PRN    Or  acetaminophen (TYLENOL) suppository 650 mg, Q6H PRN  polyethylene glycol (GLYCOLAX) packet 17 g, Daily PRN  promethazine (PHENERGAN) tablet 12.5 mg, Q6H PRN    Or  ondansetron (ZOFRAN) injection 4 mg, Q6H PRN  enoxaparin (LOVENOX) injection 40 mg, Daily  influenza quadrivalent split vaccine (FLUZONE;FLUARIX;FLULAVAL;AFLURIA) injection 0.5 mL, Prior to discharge        Objective:  BP (!) 101/42   Pulse 99   Temp 97.7 °F (36.5 °C) (Temporal)   Resp 18   Ht 5' 5\" (1.651 m)   Wt 154 lb 12.2 oz (70.2 kg)   SpO2 97%   BMI 25.75 kg/m²     Intake/Output Summary (Last 24 hours) at 11/23/2020 0842  Last data filed at 11/23/2020 0518  Gross per 24 hour   Intake 1757 ml   Output 1250 ml   Net 507 ml      Wt Readings from Last 3 Encounters:   11/23/20 154 lb 12.2 oz (70.2 kg)   10/27/20 168 lb 6.4 oz (76.4 kg)   06/09/19 145 lb (65.8 kg)       General appearance: Elderly white female, jaundiced. ENT: Moist oral mucosa. Trachea midline, no adenopathy. Cardiovascular: Regular rhythm, normal S1, S2. No murmur. No edema in lower extremities  Respiratory: lungs clear, no wheeze or crackles. GI: Abdomen soft, no tenderness, not distended, normal bowel sounds  Musculoskeletal: No cyanosis in digits, neck supple  Psych: Normal affect. Alert and oriented to place  Skin: Warm, dry, normal turgor, +jaundice  Extremity exam shows brisk capillary refill. Peripheral pulses are palpable in lower extremities     Labs and Tests:  CBC:   Recent Labs     11/21/20 2200 11/22/20  0337   WBC 13.5* 11.4*   HGB 11.4* 9.7*    246     BMP:    Recent Labs     11/21/20 2200 11/22/20  0337   * 131*   K 4.6 4.3   CL 94* 99   CO2 25 25   BUN 20 18   CREATININE <0.5* <0.5*   GLUCOSE 109* 124*     Hepatic:   Recent Labs     11/21/20 2200 11/22/20  0337   AST 98* 84*   * 98*   BILITOT 19.0* 17.5*   ALKPHOS 993* 908*     CT ABDOMEN PELVIS WO CONTRAST Additional Contrast? None   Final Result   1. Interval worsening since October 2020 of severe gallbladder distension and   intra- and extrahepatic biliary dilatation. No common bile duct stent is   identified. 2. Inflammatory stranding surrounding the pancreatic tail at the splenic   hilum suggestive of acute pancreatitis. 3. Patient's known pancreatic cancer is not well seen without IV contrast.   Pancreatic ductal dilatation measures 11 mm. 4. Duodenal stent is in place. No upstream bowel obstruction. 5. Questionable new masses in the left hepatic lobe which may represent   metastatic disease. Evaluation is limited without IV contrast.   6. Moderate hiatal hernia. XR CHEST PORTABLE   Final Result   Right greater than left basilar predominant heterogeneous opacities. Differential considerations include atelectasis/scarring, mild pulmonary   edema or an infectious/inflammatory process. Small right pleural effusion. Cardiomegaly. Superior mediastinal prominence may be accentuated by patient positioning and   portable technique. Underlying lymphadenopathy is not excluded. Short-term   follow-up PA and lateral chest radiographs may be helpful for further   evaluation. Problem List  Active Problems:    Sepsis ([address]  Resolved Problems:    * No resolved hospital problems. *       Assessment & Plan:     Acute pancreatitis and cholangitis: Improving symptoms. Biliary obstruction secondary to pancreatic malignancy. As per GI service, would require IR placement of PTC. As per GI service, Dr. Kaylee Menezes note family would currently like to use antibiotics only for care and do not wish to pursue any procedural interventions. Palliative care consulted. We will continue IV Zosyn. As needed pain medication. Pancreatic adenocarcinoma:  s/p ERCP with stent placement on 6/19/20. Stent exchange done on 10/18 by Dr. Steffi Sandhoff. EGD with biliary stent removal and duodenal dilation on 10/21. As per GI and oncology, not resectable, not a candidate for chemotherapy,, recommended palliative care and Hospice consult. CODE STATUS changed to DNR-CCA/DNI. Palliative care reconsulted to discuss goals of care, symptom management and placement.     Hyperbilirubinemia in the setting of pancreatic mass:  CT shows pancreatic ductal dilatation. Will continue Zosyn IV. GI recommends IR guided placement of PTC. However patient and family would not pursue any procedural interventions at this time. Mild hyponatremia: sodium 127 --> 132. In the setting of volume depletion. Able to tolerate p.o. intake. Atrial fibrillation, rate controlled:   Heart rate is controlled. Holding AV desean blocker due to hypotension. During previous hospitalization, patient was deemed not a candidate for anticoagulation. CHF: Stable. Essential hypertension: Labile BPs. Holding antihypertensives. ACP: new consult has been initiated to palliative care on admission.     IV Access: peripehral iv  Diet: Diet NPO Effective Now Exceptions are: Sips with Meds  Code:DNR-CCA  DVT PPX: lovenox sq d  Disposition: pending palliative care consult, placement      Aubrey Ge MD   11/23/2020 8:42 AM

## 2020-11-23 NOTE — CARE COORDINATION
Discharge Planning Assessment    RN/SW discharge planner met with patient/ (and family member) to discuss reason for admission, current living situation, and potential needs at the time of discharge    Demographics/Insurance verified:  Yes    Current type of dwelling:  house    Patient from ECF/SW confirmed with:  N/A    Living arrangements:   Cordell Wilson and his wife have moved in to be care givers    Level of function/Support:  dependent     PCP:   Dr. Skylar Davidson      DME: hospital bed and bed side equipment provided by [address]    Active with any community resources/agencies/skilled home care:  Revoked [address] to come to hospital to get infection treated    Medication compliance issues:   No    Financial issues that could impact healthcare:  NO    Tentative discharge plan:   Home w/HOC    Spoke with son Jac Shultz on telephone. Patient was brought in on Saturday with suspicion of infection again. Family revoked hospice so patient can be treated for infection with antibiotics. Plan is to sign with HOC again at discharge. Currently in ICU on IVF & IV Zosyn    Case management will follow progress and assist with discharge planning as needed.     Miriam Mcneal RN BSN  Case Management  [phone]

## 2020-11-23 NOTE — PROGRESS NOTES
No void since this am when patient was incontinent checked bladder with the bladder scanner. Large amount noted. Franklin catheter inserted without difficulty per Christina Ortega per policy. Returned a large amount of dark kayla urine. Skin care and vonnie care provided with repositioning. Patient denies any complaints of itching or pain.

## 2020-11-23 NOTE — PLAN OF CARE
Problem: Falls - Risk of:  Goal: Will remain free from falls  Description: Will remain free from falls  11/23/2020 1147 by Arleth Sparks RN  Outcome: Ongoing  11/23/2020 0100 by Kamilla Presley RN  Outcome: Ongoing     Problem: Skin Integrity:  Goal: Will show no infection signs and symptoms  Description: Will show no infection signs and symptoms  11/23/2020 1147 by Arleth Sparks RN  Outcome: Ongoing  11/23/2020 0100 by Kamilla Presley RN  Outcome: Ongoing

## 2020-11-23 NOTE — PROGRESS NOTES
Pt. Morning vitals and assessment complete. Pt. Sleepy and lethargic. Pt. Joanna Martinescooby. NPO changed to full liquids as ordered by doctor at bedside. No complaints of pain, call light within reach, will continue to monitor.

## 2020-11-23 NOTE — FLOWSHEET NOTE
See flow sheets for assessment. A&O x4, but states doesn't really remember a large portion of the day. Very jaundiced. Monitor a-fib with controlled VR. Denies pain or nausea. Abd soft, non-tender, BS present. Assisted pt to call family members. Repositioned for comfort and pressure reduction.

## 2020-11-24 VITALS
RESPIRATION RATE: 16 BRPM | OXYGEN SATURATION: 97 % | DIASTOLIC BLOOD PRESSURE: 76 MMHG | HEART RATE: 89 BPM | WEIGHT: 162.04 LBS | BODY MASS INDEX: 27 KG/M2 | HEIGHT: 65 IN | TEMPERATURE: 97 F | SYSTOLIC BLOOD PRESSURE: 118 MMHG

## 2020-11-24 LAB
A/G RATIO: 0.5 (ref 1.1–2.2)
ALBUMIN SERPL-MCNC: 1.6 G/DL (ref 3.4–5)
ALP BLD-CCNC: 796 U/L (ref 40–129)
ALT SERPL-CCNC: 72 U/L (ref 10–40)
ANION GAP SERPL CALCULATED.3IONS-SCNC: 10 MMOL/L (ref 3–16)
ANISOCYTOSIS: ABNORMAL
AST SERPL-CCNC: 79 U/L (ref 15–37)
BANDED NEUTROPHILS RELATIVE PERCENT: 3 % (ref 0–7)
BASOPHILS ABSOLUTE: 0 K/UL (ref 0–0.2)
BASOPHILS RELATIVE PERCENT: 0 %
BILIRUB SERPL-MCNC: 16.2 MG/DL (ref 0–1)
BUN BLDV-MCNC: 15 MG/DL (ref 7–20)
CALCIUM SERPL-MCNC: 7.3 MG/DL (ref 8.3–10.6)
CHLORIDE BLD-SCNC: 102 MMOL/L (ref 99–110)
CO2: 22 MMOL/L (ref 21–32)
CREAT SERPL-MCNC: <0.5 MG/DL (ref 0.6–1.2)
EOSINOPHILS ABSOLUTE: 0 K/UL (ref 0–0.6)
EOSINOPHILS RELATIVE PERCENT: 0 %
GFR AFRICAN AMERICAN: >60
GFR NON-AFRICAN AMERICAN: >60
GLOBULIN: 3.1 G/DL
GLUCOSE BLD-MCNC: 120 MG/DL (ref 70–99)
HCT VFR BLD CALC: 29 % (ref 36–48)
HEMOGLOBIN: 9.8 G/DL (ref 12–16)
LYMPHOCYTES ABSOLUTE: 0.9 K/UL (ref 1–5.1)
LYMPHOCYTES RELATIVE PERCENT: 10 %
MAGNESIUM: 1.9 MG/DL (ref 1.8–2.4)
MCH RBC QN AUTO: 29.6 PG (ref 26–34)
MCHC RBC AUTO-ENTMCNC: 33.7 G/DL (ref 31–36)
MCV RBC AUTO: 88 FL (ref 80–100)
MONOCYTES ABSOLUTE: 0.2 K/UL (ref 0–1.3)
MONOCYTES RELATIVE PERCENT: 2 %
NEUTROPHILS ABSOLUTE: 7.8 K/UL (ref 1.7–7.7)
NEUTROPHILS RELATIVE PERCENT: 85 %
PDW BLD-RTO: 17.8 % (ref 12.4–15.4)
PLATELET # BLD: 291 K/UL (ref 135–450)
PLATELET SLIDE REVIEW: ADEQUATE
PMV BLD AUTO: 8.7 FL (ref 5–10.5)
POTASSIUM REFLEX MAGNESIUM: 3.2 MMOL/L (ref 3.5–5.1)
RBC # BLD: 3.3 M/UL (ref 4–5.2)
SLIDE REVIEW: ABNORMAL
SODIUM BLD-SCNC: 134 MMOL/L (ref 136–145)
TOTAL PROTEIN: 4.7 G/DL (ref 6.4–8.2)
WBC # BLD: 8.9 K/UL (ref 4–11)

## 2020-11-24 PROCEDURE — 6360000002 HC RX W HCPCS: Performed by: INTERNAL MEDICINE

## 2020-11-24 PROCEDURE — 80053 COMPREHEN METABOLIC PANEL: CPT

## 2020-11-24 PROCEDURE — 83735 ASSAY OF MAGNESIUM: CPT

## 2020-11-24 PROCEDURE — 2580000003 HC RX 258: Performed by: INTERNAL MEDICINE

## 2020-11-24 PROCEDURE — 85025 COMPLETE CBC W/AUTO DIFF WBC: CPT

## 2020-11-24 PROCEDURE — 6370000000 HC RX 637 (ALT 250 FOR IP): Performed by: INTERNAL MEDICINE

## 2020-11-24 RX ORDER — POTASSIUM CHLORIDE 7.45 MG/ML
10 INJECTION INTRAVENOUS PRN
Status: DISCONTINUED | OUTPATIENT
Start: 2020-11-24 | End: 2020-11-24 | Stop reason: HOSPADM

## 2020-11-24 RX ORDER — POTASSIUM CHLORIDE 20 MEQ/1
40 TABLET, EXTENDED RELEASE ORAL PRN
Status: DISCONTINUED | OUTPATIENT
Start: 2020-11-24 | End: 2020-11-24 | Stop reason: HOSPADM

## 2020-11-24 RX ORDER — POTASSIUM CHLORIDE 20 MEQ/1
20 TABLET, EXTENDED RELEASE ORAL 2 TIMES DAILY WITH MEALS
Status: DISCONTINUED | OUTPATIENT
Start: 2020-11-24 | End: 2020-11-24 | Stop reason: HOSPADM

## 2020-11-24 RX ORDER — POTASSIUM CHLORIDE 20 MEQ/1
20 TABLET, EXTENDED RELEASE ORAL 2 TIMES DAILY WITH MEALS
Qty: 60 TABLET | Refills: 0 | Status: SHIPPED | OUTPATIENT
Start: 2020-11-24

## 2020-11-24 RX ADMIN — MORPHINE SULFATE 2 MG: 2 INJECTION, SOLUTION INTRAMUSCULAR; INTRAVENOUS at 00:37

## 2020-11-24 RX ADMIN — POTASSIUM BICARBONATE 40 MEQ: 782 TABLET, EFFERVESCENT ORAL at 10:21

## 2020-11-24 RX ADMIN — ENOXAPARIN SODIUM 40 MG: 40 INJECTION SUBCUTANEOUS at 08:26

## 2020-11-24 RX ADMIN — POLYETHYLENE GLYCOL 3350 17 G: 17 POWDER, FOR SOLUTION ORAL at 10:21

## 2020-11-24 RX ADMIN — OXYCODONE HYDROCHLORIDE 5 MG: 5 TABLET ORAL at 18:23

## 2020-11-24 RX ADMIN — Medication 10 ML: at 08:24

## 2020-11-24 RX ADMIN — PIPERACILLIN AND TAZOBACTAM 3.38 G: 3; .375 INJECTION, POWDER, LYOPHILIZED, FOR SOLUTION INTRAVENOUS at 14:36

## 2020-11-24 RX ADMIN — LEVOTHYROXINE SODIUM 100 MCG: 0.1 TABLET ORAL at 08:27

## 2020-11-24 RX ADMIN — HYDROXYZINE HYDROCHLORIDE 25 MG: 25 TABLET, FILM COATED ORAL at 10:21

## 2020-11-24 RX ADMIN — POTASSIUM CHLORIDE 20 MEQ: 1500 TABLET, EXTENDED RELEASE ORAL at 18:23

## 2020-11-24 RX ADMIN — PIPERACILLIN AND TAZOBACTAM 3.38 G: 3; .375 INJECTION, POWDER, LYOPHILIZED, FOR SOLUTION INTRAVENOUS at 04:17

## 2020-11-24 RX ADMIN — HYDROXYZINE HYDROCHLORIDE 25 MG: 25 TABLET, FILM COATED ORAL at 00:50

## 2020-11-24 ASSESSMENT — PAIN SCALES - GENERAL
PAINLEVEL_OUTOF10: 4
PAINLEVEL_OUTOF10: 0
PAINLEVEL_OUTOF10: 6
PAINLEVEL_OUTOF10: 0
PAINLEVEL_OUTOF10: 0

## 2020-11-24 NOTE — PROGRESS NOTES
[facility]    11/24/2020 8:28 AM        Name: Diamond Maloney . Admitted: 11/21/2020  Primary Care Provider: Srini Christopher MD (Tel: [phone])    Brief Course:  80 y.o. female with history of pancreatic cancer who was recently hospitalized for bacteremia, history of atrial fibrillation, CHF, essential hypertension, who was previously with hospice (hospice was revoked and CODE STATUS currently is DNR-CCA/DNI), who was sent to the emergency room for concern for possible sepsis. Abnormal labs in the emergency room include sodium of 127, lipase 109, troponin 0 0.02, procalcitonin of 0.39, , AST 98, total bilirubin greater than 19. There is concern for new hepatic metastasis as well as pancreatitis. Consulted GI service. GI recommends IR guided PTC placement which patient has been refusing. Palliative care consulted for goals of care. CC: pain abdomen  Subjective: No acute events overnight. Patient reports feeling hungry and requesting for oral diet. Offers no complaints.     Reviewed interval ancillary notes    Current Medications  hydrOXYzine (ATARAX) tablet 25 mg, TID PRN  levothyroxine (SYNTHROID) tablet 100 mcg, Daily  piperacillin-tazobactam (ZOSYN) 3.375 g in dextrose 5 % 50 mL IVPB extended infusion (mini-bag), Q8H  oxyCODONE (ROXICODONE) immediate release tablet 5 mg, Q6H PRN  sodium chloride flush 0.9 % injection 10 mL, 2 times per day  sodium chloride flush 0.9 % injection 10 mL, PRN  acetaminophen (TYLENOL) tablet 650 mg, Q6H PRN    Or  acetaminophen (TYLENOL) suppository 650 mg, Q6H PRN  polyethylene glycol (GLYCOLAX) packet 17 g, Daily PRN  promethazine (PHENERGAN) tablet 12.5 mg, Q6H PRN    Or  ondansetron (ZOFRAN) injection 4 mg, Q6H PRN  enoxaparin (LOVENOX) injection 40 mg, Daily  influenza quadrivalent split vaccine (FLUZONE;FLUARIX;FLULAVAL;AFLURIA) injection 0.5 mL, Prior to discharge        Objective:  BP (!) 93/52   Pulse 96   Temp 96 °F (35.6 °C) (Temporal)   Resp 12   Ht 5' 5\" (1.651 m)   Wt 162 lb 0.6 oz (73.5 kg)   SpO2 93%   BMI 26.96 kg/m²     Intake/Output Summary (Last 24 hours) at 11/24/2020 4615  Last data filed at 11/24/2020 0422  Gross per 24 hour   Intake 820 ml   Output 1150 ml   Net -330 ml      Wt Readings from Last 3 Encounters:   11/24/20 162 lb 0.6 oz (73.5 kg)   10/27/20 168 lb 6.4 oz (76.4 kg)   06/09/19 145 lb (65.8 kg)       General appearance: Elderly white female, jaundiced. ENT: Moist oral mucosa. Trachea midline, no adenopathy. Cardiovascular: Regular rhythm, normal S1, S2. No murmur. No edema in lower extremities  Respiratory: lungs clear, no wheeze or crackles. GI: Abdomen soft, no tenderness, not distended, normal bowel sounds  Musculoskeletal: No cyanosis in digits, neck supple  Psych: Normal affect. Alert and oriented to place  Skin: Warm, dry, normal turgor, +jaundice  Extremity exam shows brisk capillary refill. Peripheral pulses are palpable in lower extremities     Labs and Tests:  CBC:   Recent Labs     11/22/20 0337 11/23/20  1012 11/24/20  0458   WBC 11.4* 9.5 8.9   HGB 9.7* 11.1* 9.8*    300 291     BMP:    Recent Labs     11/22/20 0337 11/23/20  1012 11/24/20  0458   * 132* 134*   K 4.3 3.8 3.2*   CL 99 99 102   CO2 25 23 22   BUN 18 15 15   CREATININE <0.5* <0.5* <0.5*   GLUCOSE 124* 80 120*     Hepatic:   Recent Labs     11/22/20 0337 11/23/20  1012 11/24/20  0458   AST 84* 92* 79*   ALT 98* 93* 72*   BILITOT 17.5* 18.0* 16.2*   ALKPHOS 908* 892* 796*     CT ABDOMEN PELVIS WO CONTRAST Additional Contrast? None   Final Result   1. Interval worsening since October 2020 of severe gallbladder distension and   intra- and extrahepatic biliary dilatation. No common bile duct stent is   identified.    2. Inflammatory stranding surrounding the pancreatic tail at the splenic   hilum suggestive of acute pancreatitis. 3. Patient's known pancreatic cancer is not well seen without IV contrast.   Pancreatic ductal dilatation measures 11 mm. 4. Duodenal stent is in place. No upstream bowel obstruction. 5. Questionable new masses in the left hepatic lobe which may represent   metastatic disease. Evaluation is limited without IV contrast.   6. Moderate hiatal hernia. XR CHEST PORTABLE   Final Result   Right greater than left basilar predominant heterogeneous opacities. Differential considerations include atelectasis/scarring, mild pulmonary   edema or an infectious/inflammatory process. Small right pleural effusion. Cardiomegaly. Superior mediastinal prominence may be accentuated by patient positioning and   portable technique. Underlying lymphadenopathy is not excluded. Short-term   follow-up PA and lateral chest radiographs may be helpful for further   evaluation. Problem List  Active Problems:    Sepsis ([address]  Resolved Problems:    * No resolved hospital problems. *       Assessment & Plan:     Acute pancreatitis and cholangitis: Improving symptoms. Biliary obstruction secondary to pancreatic malignancy. As per GI service, would require IR placement of PTC. As per GI service, Dr. Felicia Diaz note family would currently like to use antibiotics only for care and do not wish to pursue any procedural interventions. Palliative care consulted. We will continue IV Zosyn. As needed pain medication. Pancreatic adenocarcinoma:  s/p ERCP with stent placement on 6/19/20. Stent exchange done on 10/18 by Dr. Nazia Ruth. EGD with biliary stent removal and duodenal dilation on 10/21. As per GI and oncology, not resectable, not a candidate for chemotherapy,, recommended palliative care and Hospice consult. CODE STATUS changed to DNR-CCA/DNI. Palliative care reconsulted to discuss goals of care, symptom management and placement.     Hyperbilirubinemia in the setting of pancreatic mass:  CT

## 2020-11-24 NOTE — PLAN OF CARE
Problem: Falls - Risk of:  Goal: Will remain free from falls  Description: Will remain free from falls  11/24/2020 0131 by Yareli Inman RN  Outcome: Ongoing     Problem: Falls - Risk of:  Goal: Absence of physical injury  Description: Absence of physical injury  11/24/2020 0131 by Yareli Inman RN  Outcome: Ongoing     Problem: Skin Integrity:  Goal: Will show no infection signs and symptoms  Description: Will show no infection signs and symptoms  11/24/2020 0131 by Yareli Inman RN  Outcome: Ongoing     Problem: Skin Integrity:  Goal: Absence of new skin breakdown  Description: Absence of new skin breakdown  11/24/2020 0131 by Yareli Inman RN  Outcome: Ongoing

## 2020-11-24 NOTE — PROGRESS NOTES
Shift assessment complete. VSS, see doc flowsheets. Medications administered per MAR. Franklin in place, draining tea colored urine w/ sediment. Pt complains of generalized pain. Fall precautions in place, hourly rounding, call light and belongings in reach, bed in lowest position, wheels locked in place, side rails up x 2, walkways free of clutter, bed alarm on. Pt has no further needs at this time, will continue to monitor.

## 2020-11-24 NOTE — PLAN OF CARE
Data- discharge order received, pt verbalized agreement to discharge, needs for Home Hospice with [facility]. Action- AVS prepared, discharge instructions prepared and called to son Jamil Cooper, will be given to transporters @ discharge, medication information packet given r/t NEW or CHANGED prescriptions, pt's son verbalized understanding further self-review. D/C instruction summary: Diet- full liquid, regular diet for pleasure, Activity- up with assistance, follow up with Primary Care Physician Page Wilson [phone] appointment 2 days with home visit or video visit, immunizations reviewed and declined, medications prescriptions to be filled at [facility]. Contact information provided to above agencies used. Response- [facility] [id number] [facility] reported faxing completed AVS to home [facility] services stated above. Pt belongings gathered, IV removed, pt dressed in hospital gown. Awaiting transport due at 1900. Avis Flores RN, BSN, PCCN.

## 2020-11-24 NOTE — PLAN OF CARE
Re-Assessment complete, see flow sheet. /76   Pulse 89   Temp 97 °F (36.1 °C) (Temporal)   Resp 16   Ht 5' 5\" (1.651 m)   Wt 162 lb 0.6 oz (73.5 kg)   SpO2 97%   BMI 26.96 kg/m² ,ATRIALfirillatio. Gerardo Speaker RN, BSN, PCCN.

## 2020-11-24 NOTE — PROGRESS NOTES
Shift assessment complete; see flowsheets. VSS and afebrile. Update given to daughter in law- will speak with SW about possible transfer home today with HOC. Pt disimpacted digitally and states she feels much better following. Assisted getting up to chair using Stedy Lift- pt tolerated this very well. Dietary has delivered tray, pt remains up in chair.  PRN K+ replacement given for K+ 3.2 this AM, PRN glycolax given for constipation, and PRN atarax given per pt request.

## 2020-11-24 NOTE — PLAN OF CARE
Incontinent small bowel movement now, incontinent small amount urine & medium bowel movement prior to back to bed with maximum assist of 2 @ 1700. Very restless currently, repositioned several times & covered with warm blankets. Alida Trujillo RN, BSN, PCCN.

## 2020-11-24 NOTE — CARE COORDINATION
Discharge Note:    Patient will be transporting home at 7 pm with hospice. Notified son Greer Alvarado who is caregiver.     Fransisco Cordero RN BSN  Case Management  [phone]

## 2020-11-24 NOTE — DISCHARGE SUMMARY
[facility]    Patient Demographics    Patient. [address]  Date of Birth. 5/21/1928  MRN. [id number]     Primary care provider. Qing Barth MD  (Tel: [phone])    Admit date: 11/21/2020    Discharge date (blank if same as Note Date): Note Date: 11/24/2020     Reason for Hospitalization. Chief Complaint   Patient presents with    Illness     Pt in via [address] [place] [address] squad from home, pt is a hospice pt and a DNR per family, although they were unable to provide DNR paperwork. Hospice RN requested pt be sent in for evaluation, concern for sepsis. Significant Findings. Active Problems:    Sepsis ([address]  Resolved Problems:    * No resolved hospital problems. *       Problems and results from this hospitalization that need follow up. 1. None    Significant test results and incidental findings. CT ABDOMEN PELVIS WO CONTRAST Additional Contrast? None   Final Result   1. Interval worsening since October 2020 of severe gallbladder distension and   intra- and extrahepatic biliary dilatation. No common bile duct stent is   identified. 2. Inflammatory stranding surrounding the pancreatic tail at the splenic   hilum suggestive of acute pancreatitis. 3. Patient's known pancreatic cancer is not well seen without IV contrast.   Pancreatic ductal dilatation measures 11 mm. 4. Duodenal stent is in place. No upstream bowel obstruction. 5. Questionable new masses in the left hepatic lobe which may represent   metastatic disease. Evaluation is limited without IV contrast.   6. Moderate hiatal hernia. XR CHEST PORTABLE   Final Result   Right greater than left basilar predominant heterogeneous opacities. Differential considerations include atelectasis/scarring, mild pulmonary   edema or an infectious/inflammatory process. Small right pleural effusion. Cardiomegaly.       Superior mediastinal prominence may be accentuated by procedural interventions at this time.   Mild hyponatremia: sodium 127 --> 132. In the setting of volume depletion. Able to tolerate p.o. intake.   Atrial fibrillation, rate controlled:   Heart rate is controlled. Holding AV desean blocker due to hypotension. During previous hospitalization, patient was deemed not a candidate for anticoagulation.   CHF: Stable.   Essential hypertension: Labile BPs. Holding antihypertensives.   ACP: new consult has been initiated to palliative care on admission.     I spoke with patient's son-in-law Dr. Ailin Bean at [phone] and updated him on current recommendations from GI. Family would want patient to go home with hospice. All his questions were answered to his satisfaction. Agreeable to take patient home today. Consults. IP CONSULT TO PALLIATIVE CARE  IP CONSULT TO GI    Physical examination on discharge day. /76   Pulse 110   Temp 96.9 °F (36.1 °C) (Temporal)   Resp 21   Ht 5' 5\" (1.651 m)   Wt 162 lb 0.6 oz (73.5 kg)   SpO2 98%   BMI 26.96 kg/m²   General appearance: Elderly white female, jaundiced. ENT: Moist oral mucosa. Trachea midline, no adenopathy. Cardiovascular: Regular rhythm, normal S1, S2. No murmur. No edema in lower extremities  Respiratory: lungs clear, no wheeze or crackles. GI: Abdomen soft, no tenderness, not distended, normal bowel sounds  Musculoskeletal: No cyanosis in digits, neck supple  Psych: Normal affect. Alert and oriented to place  Skin: Warm, dry, normal turgor, +jaundice  Extremity exam shows brisk capillary refill. Peripheral pulses are palpable in lower extremities        Condition at time of discharge stable    Medication instructions provided to patient at discharge.      Medication List      START taking these medications    potassium chloride 20 MEQ extended release tablet  Commonly known as:  KLOR-CON M  Take 1 tablet by mouth 2 times daily (with meals)        CONTINUE taking these medications esomeprazole Magnesium 20 MG Pack  Commonly known as:  NEXIUM     hydrOXYzine 25 MG tablet  Commonly known as:  ATARAX     levothyroxine 100 MCG tablet  Commonly known as:  Synthroid  Take 1 tablet by mouth Daily     Lexapro 20 MG tablet  Generic drug:  escitalopram     ondansetron 4 MG tablet  Commonly known as:  ZOFRAN     polyethylene glycol 17 g packet  Commonly known as:  GLYCOLAX  Take 17 g by mouth daily as needed for Constipation        STOP taking these medications    dilTIAZem 60 MG tablet  Commonly known as:  CARDIZEM     furosemide 40 MG tablet  Commonly known as:  Lasix     metoprolol tartrate 25 MG tablet  Commonly known as:  LOPRESSOR     Premarin 0.625 MG tablet  Generic drug:  estrogens (conjugated)     triamcinolone 0.1 % cream  Commonly known as:  KENALOG           Where to Get Your Medications      These medications were sent to [address] [place] [address], [address] [place] [address] [phone]  [address] [facility]    Phone:  [phone]   · potassium chloride 20 MEQ extended release tablet         Discharge recommendations given to patient. Follow Up. pcp in 1 week   Disposition. Home with hospice  Activity. activity as tolerated  Diet: DIET FULL LIQUID;      Spent 45 minutes in discharge process.     Signed:  Alfornia Saint, MD     11/24/2020 1:40 PM

## 2020-11-24 NOTE — CARE COORDINATION
Discharge planning note:    Dr. Liang Subramanian reports patient is ready for discharge today.   Called referral to [facility] for dc home    Miles Banerjee RN BSN  Case Management  [phone]

## 2020-11-25 LAB
BLOOD CULTURE, ROUTINE: NORMAL
CULTURE, BLOOD 2: NORMAL

## 2020-11-25 NOTE — PROGRESS NOTES
incintinent of small bowel movement. Given vonnie care & diaper change. cardiac monitoring removed. Disposition to Discharged via cart/stretcher and via ambulance to home with support by EMS transportation, no complications reported. Greg Simmons RN, BSN, PCCN.
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
## (undated) DEVICE — Z CONVERTED USE 2271182 CUP DENT W4XL3IN D2IN GRN LEAK RESIST DBL SEAL CLSR ETCHED

## (undated) DEVICE — POSITIONER HD W8XH4XL8.5IN RASPBERRY FOAM SLT

## (undated) DEVICE — SYRINGE MED 50ML LUERLOCK TIP

## (undated) DEVICE — CYTOMAX II DOUBLE LUMEN CYTOLOGY BRUSH: Brand: CYTOMAX II

## (undated) DEVICE — SYRINGE INFL 60ML DISP ALLIANCE II

## (undated) DEVICE — BLOCK BITE 48FR DENT RIM CAREGUARD

## (undated) DEVICE — GOWN AURORA NONREINF LG: Brand: MEDLINE INDUSTRIES, INC.

## (undated) DEVICE — Device: Brand: SINGLE USE SOFT BRUSH

## (undated) DEVICE — ENDOSCOPIC ULTRASOUND ASPIRATION NEEDLE: Brand: EXPECT SLIMLINE SL

## (undated) DEVICE — THE DISPOSABLE RAPTOR GRASPING DEVICE IS USED TO GRASP TISSUE AND/OR RETRIEVE FOREIGN BODIES, EXCISED TISSUE AND STENTS DURING ENDOSCOPIC PROCEDURES.: Brand: RAPTOR

## (undated) DEVICE — BAG U-BAG PLASTIC 10OZ MICROPORE ADHES

## (undated) DEVICE — GUIDEWIRE ENDOSCP L260CM DIA0.018IN PANCREAS BILI HYDRPHLC

## (undated) DEVICE — SET VLV 3 PC AWS DISPOSABLE GRDIAN SCOPEVALET

## (undated) DEVICE — ELECTRODE PT RET AD L9FT HI MOIST COND ADH HYDRGEL CORDED

## (undated) DEVICE — CANNULATING SPHINCTEROTOME: Brand: TRUETOME DREAMWIRE 44

## (undated) DEVICE — THE DISPOSABLE RAPTOR GRASPING DEVICE-MINI IS BE USED TO GRASP TISSUE AND/OR RETRIEVE FOREIGN BODIES, EXCISED TISSUE AND STENTS DURING ENDOSCOPIC PROCEDURES.: Brand: RAPTOR

## (undated) DEVICE — BW-412T DISP COMBO CLEANING BRUSH: Brand: SINGLE USE COMBINATION CLEANING BRUSH

## (undated) DEVICE — SOLUTION IV IRRIG WATER 500ML POUR BRL ST 2F7113

## (undated) DEVICE — FORCEPS BX L240CM WRK CHN 2.8MM STD CAP W/ NDL MIC MESH

## (undated) DEVICE — MOUTHPIECE ENDOSCP L CTRL OPN AND SIDE PORTS DISP

## (undated) DEVICE — SYRINGE, LUER LOCK, 10ML: Brand: MEDLINE

## (undated) DEVICE — GUIDEWIRE ENDOSCP L450CM DIA0.035IN BILI STR RND TIP HI

## (undated) DEVICE — ESOPHAGEAL/COLONIC/BILIARY WIREGUIDED BALLOON DILATATION CATHETER: Brand: CRE™ PRO

## (undated) DEVICE — Device: Brand: DISPOSABLE ELECTROSURGICAL SNARE

## (undated) DEVICE — PROCEDURE KIT ENDOSCP CUST

## (undated) DEVICE — RETRIEVAL BALLOON CATHETER: Brand: EXTRACTOR™ PRO XL